# Patient Record
Sex: MALE | Race: WHITE | NOT HISPANIC OR LATINO | Employment: OTHER | URBAN - METROPOLITAN AREA
[De-identification: names, ages, dates, MRNs, and addresses within clinical notes are randomized per-mention and may not be internally consistent; named-entity substitution may affect disease eponyms.]

---

## 2018-01-23 ENCOUNTER — HOSPITAL ENCOUNTER (EMERGENCY)
Facility: MEDICAL CENTER | Age: 53
End: 2018-01-23

## 2018-01-23 VITALS
WEIGHT: 121.47 LBS | BODY MASS INDEX: 17.99 KG/M2 | OXYGEN SATURATION: 91 % | HEIGHT: 69 IN | RESPIRATION RATE: 17 BRPM | SYSTOLIC BLOOD PRESSURE: 112 MMHG | TEMPERATURE: 98.8 F | HEART RATE: 99 BPM | DIASTOLIC BLOOD PRESSURE: 68 MMHG

## 2018-01-23 LAB — GLUCOSE BLD-MCNC: 405 MG/DL (ref 65–99)

## 2018-01-23 PROCEDURE — 302449 STATCHG TRIAGE ONLY (STATISTIC)

## 2018-01-23 PROCEDURE — 82962 GLUCOSE BLOOD TEST: CPT

## 2018-01-23 ASSESSMENT — PAIN SCALES - GENERAL: PAINLEVEL_OUTOF10: 0

## 2018-01-23 NOTE — ED NOTES
Pt ambulates to triage  Chief Complaint   Patient presents with   • High Blood Sugar   reports taking his metformin, off insulin 3 months  405 in triage  Pt asked to wait in lobby, pt updated on triage process and pt asked to inform RN of any changes.

## 2018-01-24 ENCOUNTER — OFFICE VISIT (OUTPATIENT)
Dept: MEDICAL GROUP | Facility: MEDICAL CENTER | Age: 53
End: 2018-01-24
Payer: MEDICARE

## 2018-01-24 VITALS
DIASTOLIC BLOOD PRESSURE: 70 MMHG | OXYGEN SATURATION: 99 % | WEIGHT: 117.6 LBS | SYSTOLIC BLOOD PRESSURE: 118 MMHG | RESPIRATION RATE: 16 BRPM | HEART RATE: 85 BPM | TEMPERATURE: 97.2 F | BODY MASS INDEX: 17.42 KG/M2 | HEIGHT: 69 IN

## 2018-01-24 DIAGNOSIS — R73.9 HYPERGLYCEMIA: ICD-10-CM

## 2018-01-24 DIAGNOSIS — F31.62 BIPOLAR DISORDER, CURRENT EPISODE MIXED, MODERATE (HCC): ICD-10-CM

## 2018-01-24 DIAGNOSIS — F12.11 MARIJUANA ABUSE IN REMISSION: ICD-10-CM

## 2018-01-24 LAB
APPEARANCE UR: CLEAR
BILIRUB UR STRIP-MCNC: NORMAL MG/DL
COLOR UR AUTO: YELLOW
GLUCOSE UR STRIP.AUTO-MCNC: NORMAL MG/DL
HBA1C MFR BLD: 14.3 % (ref ?–5.8)
INT CON NEG: NEGATIVE
INT CON POS: POSITIVE
KETONES UR STRIP.AUTO-MCNC: NORMAL MG/DL
LEUKOCYTE ESTERASE UR QL STRIP.AUTO: NORMAL
NITRITE UR QL STRIP.AUTO: NORMAL
PH UR STRIP.AUTO: NORMAL [PH] (ref 5–8)
PROT UR QL STRIP: NORMAL MG/DL
RBC UR QL AUTO: NORMAL
SP GR UR STRIP.AUTO: NORMAL
UROBILINOGEN UR STRIP-MCNC: NORMAL MG/DL

## 2018-01-24 PROCEDURE — 83036 HEMOGLOBIN GLYCOSYLATED A1C: CPT | Performed by: NURSE PRACTITIONER

## 2018-01-24 PROCEDURE — 99204 OFFICE O/P NEW MOD 45 MIN: CPT | Mod: 25 | Performed by: NURSE PRACTITIONER

## 2018-01-24 PROCEDURE — 81002 URINALYSIS NONAUTO W/O SCOPE: CPT | Performed by: NURSE PRACTITIONER

## 2018-01-24 ASSESSMENT — ENCOUNTER SYMPTOMS: DEPRESSION: 1

## 2018-01-24 ASSESSMENT — PATIENT HEALTH QUESTIONNAIRE - PHQ9: CLINICAL INTERPRETATION OF PHQ2 SCORE: 0

## 2018-01-24 NOTE — PROGRESS NOTES
Subjective:      Celso Rose is a 53 y.o. male who presents with Establish Care        CC: Patient is here today to establish care as well as for need of medication for diabetes. He states he was a previous patient of the [facility] in [place] where he was being treated for his bipolar disorder and diabetes.    HPI Celso Rose      1. Hyperglycemia  Patient states he has been using metformin 1000 mg twice a day as his only medication for diabetes. He reports he also was on various other medicines at other times and went to the ER yesterday but left before he was seen. They did do a nonfasting blood sugar was high at 405. He states his morning blood sugar today was 125.    2. Uncontrolled type 2 diabetes mellitus without complication, with long-term current use of insulin (CMS-HCC)  Patient is not a good historian and some of the information he gives me is conflicting with information he gave 5 minutes earlier. Apparently she was on short acting insulin and then switched to Lantus insulin which he felt worked best. Most recently he states all he has been on has been metformin but wants to come off this because it causes erectile dysfunction? He also stated he had only one good working kidney but then stated his GFR was around 60 when it was last checked. He states his blood sugar was good at 125 this morning because he took 20 units of his father's Lantus insulin. His hemoglobin A1c in the office is 14.3.    3. Bipolar disorder, current episode mixed, moderate (CMS-HCC)  Patient reports he has history of bipolar disorder for which at one time he was on treatment but not for quite a while. He states he would be willing to establish with local psychiatry and start back on medication.    4. Marijuana abuse in remission  Patient feels that much of his mental health issue was related to daily use of marijuana. He states he has been off marijuana for at least a week. He does continue to smoke cigarettes  "but denies alcohol or other drug use.  Social History   Substance Use Topics   • Smoking status: Current Every Day Smoker     Packs/day: 1.00     Types: Cigarettes   • Smokeless tobacco: Never Used   • Alcohol use No     Current Outpatient Prescriptions   Medication Sig Dispense Refill   • metformin (GLUCOPHAGE) 1000 MG tablet Take 1,000 mg by mouth 2 times a day, with meals.     • insulin glargine (LANTUS SOLOSTAR) 100 UNIT/ML Solution Pen-injector injection Inject 20 Units as instructed every evening. 1 PEN 11     No current facility-administered medications for this visit.      Family History   Problem Relation Age of Onset   • Heart Disease Mother    • Diabetes Father    • Hypertension Father    History reviewed. No pertinent past medical history.    Review of Systems   Psychiatric/Behavioral: Positive for depression.   All other systems reviewed and are negative.         Objective:     /70   Pulse 85   Temp 36.2 °C (97.2 °F)   Resp 16   Ht 1.753 m (5' 9\")   Wt 53.3 kg (117 lb 9.6 oz)   SpO2 99%   BMI 17.37 kg/m²      Physical Exam   Constitutional: He is oriented to person, place, and time. He appears well-developed and well-nourished. No distress.   HENT:   Head: Normocephalic and atraumatic.   Right Ear: External ear normal.   Left Ear: External ear normal.   Nose: Nose normal.   Mouth/Throat: Oropharynx is clear and moist. Abnormal dentition.   Eyes: Conjunctivae are normal. Right eye exhibits no discharge. Left eye exhibits no discharge.   Neck: Normal range of motion. Neck supple. No tracheal deviation present. No thyromegaly present.   Cardiovascular: Normal rate, regular rhythm and normal heart sounds.    No murmur heard.  Pulmonary/Chest: Effort normal and breath sounds normal. No respiratory distress. He has no wheezes. He has no rales.   Lymphadenopathy:     He has no cervical adenopathy.   Neurological: He is alert and oriented to person, place, and time. Coordination normal.   Skin: " Skin is warm and dry. No rash noted. He is not diaphoretic. No erythema.   Psychiatric: He has a normal mood and affect. His behavior is normal. Judgment and thought content normal.   Nursing note and vitals reviewed.              Assessment/Plan:     1. Hyperglycemia  Patient's hemoglobin A1c is high at 14.3 and his blood sugar at the emergency room yesterday was in the 400 range. He states his fasting blood sugar this morning was 125 after he took Lantus insulin given to him by his father as well as his metformin. Unfortunately we are unable to do a fasting glucose test in the office today. His urine comes back showing only trace ketones. I am concerned about patient's diabetic status but it is hard to get an accurate history as to what has worked well for him. At least it appears that his blood sugars have improved with the Lantus according to his home readings.  - POCT  A1C  - POCT glucose  - POCT Urinalysis    2. Uncontrolled type 2 diabetes mellitus without complication, with long-term current use of insulin (CMS-HCC)  Patient feels that Lantus insulin worked well for him so I will have him start at 20 units daily with his metformin. I spoke with him about other medications which we can add including Amaryl or Jardiance but he states he does not want to go on any pills. He also states he wants to come off metformin. I advised him that although there are risks with many of these medicines, his hyperglycemia is currently more of a risk. He did not want to go on 3 time a day short acting insulin. I will have him do lab work and follow back within the next week or so. I will only stop his metformin if his kidney functions are low. He probably would not go to a higher dosage. I will continue to talk to him about oral medications as well.  - insulin glargine (LANTUS SOLOSTAR) 100 UNIT/ML Solution Pen-injector injection; Inject 20 Units as instructed every evening.  Dispense: 1 PEN; Refill: 11  - COMP METABOLIC  PANEL; Future  - MICROALBUMIN CREAT RATIO URINE; Future  - LIPID PROFILE; Future    3. Bipolar disorder, current episode mixed, moderate (CMS-[facility])  Patient needs to get into psychiatry and start back on medication and a referral was placed today.  - REFERRAL TO PSYCHIATRY    4. Marijuana abuse in remission  Patient states he has been off marijuana for one week and plans on staying off this. I praised him for quitting marijuana which was obviously worsening his bipolar symptoms.

## 2018-01-25 ENCOUNTER — HOSPITAL ENCOUNTER (OUTPATIENT)
Dept: LAB | Facility: MEDICAL CENTER | Age: 53
End: 2018-01-25
Attending: NURSE PRACTITIONER
Payer: MEDICARE

## 2018-01-25 LAB
ALBUMIN SERPL BCP-MCNC: 4.5 G/DL (ref 3.2–4.9)
ALBUMIN/GLOB SERPL: 1.8 G/DL
ALP SERPL-CCNC: 86 U/L (ref 30–99)
ALT SERPL-CCNC: 12 U/L (ref 2–50)
ANION GAP SERPL CALC-SCNC: 8 MMOL/L (ref 0–11.9)
AST SERPL-CCNC: 16 U/L (ref 12–45)
BILIRUB SERPL-MCNC: 0.5 MG/DL (ref 0.1–1.5)
BUN SERPL-MCNC: 17 MG/DL (ref 8–22)
CALCIUM SERPL-MCNC: 9.7 MG/DL (ref 8.5–10.5)
CHLORIDE SERPL-SCNC: 106 MMOL/L (ref 96–112)
CHOLEST SERPL-MCNC: 169 MG/DL (ref 100–199)
CO2 SERPL-SCNC: 25 MMOL/L (ref 20–33)
CREAT SERPL-MCNC: 0.82 MG/DL (ref 0.5–1.4)
CREAT UR-MCNC: 150.7 MG/DL
GLOBULIN SER CALC-MCNC: 2.5 G/DL (ref 1.9–3.5)
GLUCOSE SERPL-MCNC: 101 MG/DL (ref 65–99)
HDLC SERPL-MCNC: 52 MG/DL
LDLC SERPL CALC-MCNC: 109 MG/DL
MICROALBUMIN UR-MCNC: 1.8 MG/DL
MICROALBUMIN/CREAT UR: 12 MG/G (ref 0–30)
POTASSIUM SERPL-SCNC: 4.2 MMOL/L (ref 3.6–5.5)
PROT SERPL-MCNC: 7 G/DL (ref 6–8.2)
SODIUM SERPL-SCNC: 139 MMOL/L (ref 135–145)
TRIGL SERPL-MCNC: 40 MG/DL (ref 0–149)

## 2018-01-25 PROCEDURE — 36415 COLL VENOUS BLD VENIPUNCTURE: CPT

## 2018-01-25 PROCEDURE — 82043 UR ALBUMIN QUANTITATIVE: CPT

## 2018-01-25 PROCEDURE — 80053 COMPREHEN METABOLIC PANEL: CPT

## 2018-01-25 PROCEDURE — 82570 ASSAY OF URINE CREATININE: CPT

## 2018-01-25 PROCEDURE — 80061 LIPID PANEL: CPT

## 2018-02-07 ENCOUNTER — OFFICE VISIT (OUTPATIENT)
Dept: MEDICAL GROUP | Facility: MEDICAL CENTER | Age: 53
End: 2018-02-07
Payer: MEDICARE

## 2018-02-07 VITALS
WEIGHT: 129 LBS | HEIGHT: 69 IN | DIASTOLIC BLOOD PRESSURE: 68 MMHG | RESPIRATION RATE: 16 BRPM | SYSTOLIC BLOOD PRESSURE: 122 MMHG | BODY MASS INDEX: 19.11 KG/M2 | OXYGEN SATURATION: 96 % | HEART RATE: 78 BPM | TEMPERATURE: 97.2 F

## 2018-02-07 DIAGNOSIS — F12.11 MARIJUANA ABUSE IN REMISSION: ICD-10-CM

## 2018-02-07 LAB — RETINAL SCREEN: NEGATIVE

## 2018-02-07 PROCEDURE — 99214 OFFICE O/P EST MOD 30 MIN: CPT | Performed by: NURSE PRACTITIONER

## 2018-02-07 PROCEDURE — 92250 FUNDUS PHOTOGRAPHY W/I&R: CPT | Mod: TC | Performed by: NURSE PRACTITIONER

## 2018-02-07 RX ORDER — METFORMIN HYDROCHLORIDE 1000 MG/1
1 TABLET, FILM COATED, EXTENDED RELEASE ORAL DAILY
Qty: 30 TAB | Refills: 11 | Status: SHIPPED | OUTPATIENT
Start: 2018-02-07 | End: 2018-02-12

## 2018-02-07 ASSESSMENT — ENCOUNTER SYMPTOMS
GASTROINTESTINAL NEGATIVE: 1
MUSCULOSKELETAL NEGATIVE: 1
CARDIOVASCULAR NEGATIVE: 1
RESPIRATORY NEGATIVE: 1
CONSTITUTIONAL NEGATIVE: 1
PSYCHIATRIC NEGATIVE: 1
EYES NEGATIVE: 1
NEUROLOGICAL NEGATIVE: 1

## 2018-02-07 NOTE — PROGRESS NOTES
Subjective:      Celso Rose is a 53 y.o. male who presents with Follow-Up (lab results)        CC: Patient is here today for follow-up on diabetes and after recent emergency room visit for hyperglycemia.    HPI Celso Rose was seen initially at the office on January 24 after an emergency room visit for hyperglycemia. He had history of diabetes but his history was conflicting when he told me what medication he was taking. He does have a history of bipolar disorder. It appeared that he was taking metformin thousand milligrams twice a day but today he states he is on long-acting metformin at the milligrams once a day. He was put on 20 units of Lantus insulin and he states that his blood sugar is now have been running below 120. He states sometimes he does not feel well when they go in the 80s range. He is trying to watch his diet. Does not appear he has had an appointment with psychiatry as of yet. He reports he has been avoiding marijuana which was a problem for him in the past.        Social History   Substance Use Topics   • Smoking status: Current Every Day Smoker     Packs/day: 1.00     Types: Cigarettes   • Smokeless tobacco: Never Used   • Alcohol use No     Current Outpatient Prescriptions   Medication Sig Dispense Refill   • metformin ER modified (GLUMETZA) 1000 MG TABLET SR 24 HR Take 1 Each by mouth every day. 30 Tab 11   • insulin glargine (LANTUS SOLOSTAR) 100 UNIT/ML Solution Pen-injector injection Inject 17 Units as instructed every evening. 1 PEN 11     No current facility-administered medications for this visit.    History reviewed. No pertinent past medical history.   Family History   Problem Relation Age of Onset   • Heart Disease Mother    • Diabetes Father    • Hypertension Father        Review of Systems   Constitutional: Negative.    HENT: Negative.    Eyes: Negative.    Respiratory: Negative.    Cardiovascular: Negative.    Gastrointestinal: Negative.    Genitourinary: Negative.   "  Musculoskeletal: Negative.    Skin: Negative.    Neurological: Negative.    Endo/Heme/Allergies: Negative.    Psychiatric/Behavioral: Negative.    All other systems reviewed and are negative.         Objective:     /68   Pulse 78   Temp 36.2 °C (97.2 °F)   Resp 16   Ht 1.753 m (5' 9\")   Wt 58.5 kg (129 lb)   SpO2 96%   BMI 19.05 kg/m²      Physical Exam   Constitutional: He is oriented to person, place, and time. He appears well-developed and well-nourished. No distress.   HENT:   Head: Normocephalic and atraumatic.   Right Ear: External ear normal.   Left Ear: External ear normal.   Nose: Nose normal.   Mouth/Throat: Oropharynx is clear and moist.   Eyes: Conjunctivae are normal. Right eye exhibits no discharge. Left eye exhibits no discharge.   Neck: Normal range of motion. Neck supple. No tracheal deviation present. No thyromegaly present.   Cardiovascular: Normal rate, regular rhythm and normal heart sounds.    No murmur heard.  Pulmonary/Chest: Effort normal and breath sounds normal. No respiratory distress. He has no wheezes. He has no rales.   Feet:   Right Foot:   Protective Sensation: 7 sites tested. 7 sites sensed.   Skin Integrity: Negative for ulcer.   Left Foot:   Protective Sensation: 7 sites tested. 7 sites sensed.   Skin Integrity: Negative for ulcer.   Lymphadenopathy:     He has no cervical adenopathy.   Neurological: He is alert and oriented to person, place, and time. Coordination normal.   Skin: Skin is warm and dry. No rash noted. He is not diaphoretic. No erythema.   Psychiatric: He has a normal mood and affect. His behavior is normal. Judgment and thought content normal.   Nursing note and vitals reviewed.              Assessment/Plan:     1. Uncontrolled type 2 diabetes mellitus without complication, with long-term current use of insulin (CMS-[facility])  Patient does appear less manic in the office today than on his previous visit. I have refilled his metformin at the 1000 mg " long-acting daily as he tells me he is currently using. I will have him decrease his Lantus down to 17 units since he states his morning blood sugar sometimes are too low. He does have a follow-up appointment with myself and the diabetic nurse in 2 months. He will do lab work before that visit.  - metformin ER modified (GLUMETZA) 1000 MG TABLET SR 24 HR; Take 1 Each by mouth every day.  Dispense: 30 Tab; Refill: 11  - insulin glargine (LANTUS SOLOSTAR) 100 UNIT/ML Solution Pen-injector injection; Inject 17 Units as instructed every evening.  Dispense: 1 PEN; Refill: 11  - Diabetic Monofilament Lower Extremity Exam  - POCT Retinal Eye Exam  - COMP METABOLIC PANEL; Future  - HEMOGLOBIN A1C; Future    2. Marijuana abuse in remission  Patient reports he is avoiding marijuana usage. I reminded him about his psychiatric referral.

## 2018-02-12 RX ORDER — METFORMIN HYDROCHLORIDE 500 MG/1
1000 TABLET, EXTENDED RELEASE ORAL DAILY
Qty: 60 TAB | Refills: 11 | Status: SHIPPED | OUTPATIENT
Start: 2018-02-12 | End: 2018-10-22

## 2018-04-04 ENCOUNTER — PATIENT OUTREACH (OUTPATIENT)
Dept: HEALTH INFORMATION MANAGEMENT | Facility: OTHER | Age: 53
End: 2018-04-04

## 2018-04-04 ENCOUNTER — TELEPHONE (OUTPATIENT)
Dept: HEALTH INFORMATION MANAGEMENT | Facility: OTHER | Age: 53
End: 2018-04-04

## 2018-04-04 DIAGNOSIS — Z12.11 SCREENING FOR COLON CANCER: Primary | ICD-10-CM

## 2018-04-04 NOTE — PROGRESS NOTES
1. Attempt #: final     2. [facility] Verified: no    3. Verify PCP: yes    4. Care Team Updated:       •   DME Company (gait device, O2, CPAP, etc.): NO       •   Other Specialists (eye doctor, derm, GYN, cardiology, endo, etc): NO    5.  Reviewed/Updated the following with patient:       •   Communication Preference Obtained? YES       •   Preferred Pharmacy? YES       •   Preferred Lab? YES       •   Family History (document living status of immediate family members and if + hx of cancer, diabetes, hypertension, hyperlipidemia, heart attack, stroke) YES. Was Abstract Encounter opened and chart updated? YES    6. [employer] Activation: declined    7. [employer] Dana: no    8. Annual Wellness Visit Scheduling  Scheduling Status:Scheduled      9. Care Gap Scheduling (Attempt to Schedule EACH Overdue Care Gap!)     Health Maintenance Due   Topic Date Due   • Annual Wellness Visit  1965   • IMM HEP B VACCINE (1 of 3 - Primary Series) 1965   • IMM DTaP/Tdap/Td Vaccine (1 - Tdap) 01/21/1983   • IMM PNEUMOCOCCAL 19-64 (ADULT) MEDIUM RISK SERIES (1 of 1 - PPSV23) 01/21/1983   • COLONOSCOPY  01/21/2014 MAILED FIT TEST         Scheduled patient for Annual Wellness Visit    10. Patient was advised: “This is a free wellness visit. The provider will screen for medical conditions to help you stay healthy. If you have other concerns to address you may be asked to discuss these at a separate visit or there may be an additional fee.”     11. Patient was informed to arrive 15 min prior to their scheduled appointment and bring in their medication bottles.

## 2018-04-09 ENCOUNTER — HOSPITAL ENCOUNTER (OUTPATIENT)
Dept: LAB | Facility: MEDICAL CENTER | Age: 53
End: 2018-04-09
Attending: NURSE PRACTITIONER
Payer: MEDICARE

## 2018-04-09 ENCOUNTER — APPOINTMENT (OUTPATIENT)
Dept: MEDICAL GROUP | Facility: MEDICAL CENTER | Age: 53
End: 2018-04-09
Payer: MEDICARE

## 2018-04-09 LAB
ALBUMIN SERPL BCP-MCNC: 4 G/DL (ref 3.2–4.9)
ALBUMIN/GLOB SERPL: 1.5 G/DL
ALP SERPL-CCNC: 76 U/L (ref 30–99)
ALT SERPL-CCNC: 9 U/L (ref 2–50)
ANION GAP SERPL CALC-SCNC: 4 MMOL/L (ref 0–11.9)
AST SERPL-CCNC: 13 U/L (ref 12–45)
BILIRUB SERPL-MCNC: 0.4 MG/DL (ref 0.1–1.5)
BUN SERPL-MCNC: 10 MG/DL (ref 8–22)
CALCIUM SERPL-MCNC: 9.5 MG/DL (ref 8.5–10.5)
CHLORIDE SERPL-SCNC: 108 MMOL/L (ref 96–112)
CO2 SERPL-SCNC: 27 MMOL/L (ref 20–33)
CREAT SERPL-MCNC: 0.82 MG/DL (ref 0.5–1.4)
EST. AVERAGE GLUCOSE BLD GHB EST-MCNC: 246 MG/DL
GLOBULIN SER CALC-MCNC: 2.6 G/DL (ref 1.9–3.5)
GLUCOSE SERPL-MCNC: 95 MG/DL (ref 65–99)
HBA1C MFR BLD: 10.2 % (ref 0–5.6)
POTASSIUM SERPL-SCNC: 4.1 MMOL/L (ref 3.6–5.5)
PROT SERPL-MCNC: 6.6 G/DL (ref 6–8.2)
SODIUM SERPL-SCNC: 139 MMOL/L (ref 135–145)

## 2018-04-09 PROCEDURE — 83036 HEMOGLOBIN GLYCOSYLATED A1C: CPT

## 2018-04-09 PROCEDURE — 36415 COLL VENOUS BLD VENIPUNCTURE: CPT

## 2018-04-09 PROCEDURE — 80053 COMPREHEN METABOLIC PANEL: CPT

## 2018-04-11 ENCOUNTER — HOSPITAL ENCOUNTER (OUTPATIENT)
Facility: MEDICAL CENTER | Age: 53
End: 2018-04-11
Attending: NURSE PRACTITIONER
Payer: MEDICARE

## 2018-04-11 PROCEDURE — 82274 ASSAY TEST FOR BLOOD FECAL: CPT

## 2018-04-12 ENCOUNTER — OFFICE VISIT (OUTPATIENT)
Dept: MEDICAL GROUP | Facility: MEDICAL CENTER | Age: 53
End: 2018-04-12
Payer: MEDICARE

## 2018-04-12 VITALS
WEIGHT: 133 LBS | HEIGHT: 68 IN | OXYGEN SATURATION: 96 % | SYSTOLIC BLOOD PRESSURE: 122 MMHG | RESPIRATION RATE: 16 BRPM | BODY MASS INDEX: 20.16 KG/M2 | DIASTOLIC BLOOD PRESSURE: 80 MMHG | HEART RATE: 86 BPM | TEMPERATURE: 98.4 F

## 2018-04-12 DIAGNOSIS — F12.90 MARIJUANA USE: ICD-10-CM

## 2018-04-12 DIAGNOSIS — F31.62 BIPOLAR DISORDER, CURRENT EPISODE MIXED, MODERATE (HCC): ICD-10-CM

## 2018-04-12 PROBLEM — F12.11 MARIJUANA ABUSE IN REMISSION: Status: RESOLVED | Noted: 2018-01-24 | Resolved: 2018-04-12

## 2018-04-12 PROCEDURE — G0439 PPPS, SUBSEQ VISIT: HCPCS | Performed by: NURSE PRACTITIONER

## 2018-04-12 PROCEDURE — 99213 OFFICE O/P EST LOW 20 MIN: CPT | Mod: 25 | Performed by: NURSE PRACTITIONER

## 2018-04-12 ASSESSMENT — PATIENT HEALTH QUESTIONNAIRE - PHQ9
5. POOR APPETITE OR OVEREATING: 0 - NOT AT ALL
SUM OF ALL RESPONSES TO PHQ QUESTIONS 1-9: 8
CLINICAL INTERPRETATION OF PHQ2 SCORE: 3

## 2018-04-12 ASSESSMENT — ACTIVITIES OF DAILY LIVING (ADL): BATHING_REQUIRES_ASSISTANCE: 0

## 2018-04-12 NOTE — PROGRESS NOTES
RN-CDE Note    Subjective:     Health changes since last visit/interval Hx: General health he states is fair but does not feel like he will be alive in 10 years.    Medications (including changes made today)  Current Outpatient Prescriptions   Medication Sig Dispense Refill   • metFORMIN ER (GLUCOPHAGE XR) 500 MG TABLET SR 24 HR Take 2 Tabs by mouth every day. 60 Tab 11   • insulin glargine (LANTUS SOLOSTAR) 100 UNIT/ML Solution Pen-injector injection Inject 17 Units as instructed every evening. 1 PEN 11     No current facility-administered medications for this visit.          Taking daily ASA: No  Taking above medications as prescribed: Yes   Patient Denies side effects of medication.    Exercise: Works in the yard  Diet: Breakfast he doesn't usually eat.  Lunch is sandwich and drinks diet soda or water.  Dinner varies.    Health Maintenance:   Health Maintenance Topics with due status: Overdue       Topic Date Due    Annual Wellness Visit 1965    IMM HEP B VACCINE 1965    IMM DTaP/Tdap/Td Vaccine 02/02/2001    COLONOSCOPY 01/21/2014         DM:   Last A1c:   Lab Results   Component Value Date/Time    HBA1C 10.2 (H) 04/09/2018 08:22 AM      A1c goal: < 7    Glucose monitoring frequency: Not testing blood sugars because he doesn't have strips    Hypoglycemic episodes: yes - states feels low if he doesn't eat     Last Retinal Exam: 2/7/18 Provider: office  Daily Foot Exam: yes  Routine Dental Exams: no    Lab Results   Component Value Date/Time    MALBCRT 12 01/25/2018 07:45 AM    MICROALBUR 1.8 01/25/2018 07:45 AM        ACR Albumin/Creatinine Ratio goal <30     Diabetic complications: none    HTN:   Blood pressure goal <140/<80 .   Currently Rx ACE/ARB: No    Dyslipidemia:    Lab Results   Component Value Date/Time    CHOLSTRLTOT 169 01/25/2018 07:45 AM     (H) 01/25/2018 07:45 AM    HDL 52 01/25/2018 07:45 AM    TRIGLYCERIDE 40 01/25/2018 07:45 AM       Lab Results   Component Value Date/Time     SODIUM 139 04/09/2018 08:22 AM    POTASSIUM 4.1 04/09/2018 08:22 AM    CHLORIDE 108 04/09/2018 08:22 AM    CO2 27 04/09/2018 08:22 AM    GLUCOSE 95 04/09/2018 08:22 AM    BUN 10 04/09/2018 08:22 AM    CREATININE 0.82 04/09/2018 08:22 AM     Lab Results   Component Value Date/Time    ALKPHOSPHAT 76 04/09/2018 08:22 AM    ASTSGOT 13 04/09/2018 08:22 AM    ALTSGPT 9 04/09/2018 08:22 AM    TBILIRUBIN 0.4 04/09/2018 08:22 AM        Currently Rx Statin: Yes      He  reports that he has been smoking Cigarettes.  He has a 1.00 pack-year smoking history. He has never used smokeless tobacco.    Objective:     Exam:  Monofilament: done  Monofilament testing with a 10 gram force: sensation intact: intact bilaterally  Visual Inspection: Feet without maceration, ulcers, fissures.  Pedal pulses: intact bilaterally      Plan:     - Diabetic diet discussed in detail-plate method.  - Home glucose monitoring.  - He will test and log.    - He will walk for 20-30 minutes daily.  - Reviewed medications and advised to take metformin after meals to decrease   G.I.upset.   - Discussed importance of immunizations and yearly eye exams.   - Encouraged patient to attend diabetes education program.   -Educational material distributed.   - Advised daily foot exams. Educated on signs of infection.       Recommended medication changes: He may benefit having having a C-peptide and ALLI to see how much insulin he is making.  He may benefit adding 2 mg Glimepiride daily but he does not want to take more medications.  He was given a Contour Next meter to start testing his blood sugars.

## 2018-04-12 NOTE — PROGRESS NOTES
Chief Complaint   Patient presents with   • Annual Wellness Visit         HPI:  Celso is a 53 y.o. here for Medicare Annual Wellness Visit as well as to be with myself and the diabetic nurse regarding his poorly controlled diabetes.    1. Uncontrolled type 2 diabetes mellitus without complication, with long-term current use of insulin (CMS-HCC)  Patient established recently with this clinic and came to us with high hemoglobin A1c at 14.3. He was started back on metformin and Lantus which he had been using in the past. He is currently taking thousand milligrams of metformin ER daily as well as 20 units of Lantus insulin. With this his hemoglobin A1c has decreased down to 10 and he states his fasting blood sugars are running well over the past 2-3 weeks. He has been reluctant to try other medications. He is very opinionated on his choices for medicine and use of medicines. He states he is trying to follow a low carb diet.    2. Bipolar disorder, current episode mixed, moderate (CMS-HCC)  Patient was referred in January to mental health because of history of bipolar disorder which patient has been treating through marijuana by himself. He states he did not follow-up on the referral but would be willing to go to counseling now.    3. Marijuana use  Patient had stopped using marijuana for a while but states he is back to using it on a regular basis because he feels it helps him with his bipolar disorder. He states he is not using alcohol or opioids.    Patient Active Problem List    Diagnosis Date Noted   • Uncontrolled type 2 diabetes mellitus without complication, with long-term current use of insulin (CMS-HCC) 01/24/2018   • Marijuana abuse in remission 01/24/2018   • Bipolar disorder, current episode mixed, moderate (CMS-HCC) 01/24/2018       Current Outpatient Prescriptions   Medication Sig Dispense Refill   • metFORMIN ER (GLUCOPHAGE XR) 500 MG TABLET SR 24 HR Take 2 Tabs by mouth every day. 60 Tab 11   • insulin  glargine (LANTUS SOLOSTAR) 100 UNIT/ML Solution Pen-injector injection Inject 17 Units as instructed every evening. 1 PEN 11     No current facility-administered medications for this visit.         Patient is taking medications as noted in medication list.  Current supplements as per medication list.     Allergies: Patient has no known allergies.    Current social contact/activities: Visit with family and friends occasionally     Patient's perception of their health: fair    Is patient current with immunizations? Yes.    He  reports that he has been smoking Cigarettes.  He has been smoking about 1.00 pack per day. He has never used smokeless tobacco. He reports that he uses drugs. He reports that he does not drink alcohol.  Ready to quit: Not Answered  Counseling given: Not Answered      DPA/Advanced directive: Patient does not have an Advanced Directive.  A packet and workshop information was given on Advanced Directives.    ROS:    Gait: Uses a cane PRN   Ostomy: No   Other tubes: No   Amputations: No   Chronic oxygen use: No   Last eye exam: 02/0718   Wears hearing aids: No   : Denies any urinary leakage during the last 6 months     Screening:  DIABETES    Has patient ever had diabetes education? Yes, and patient is interested in more. Referral pended.    Depression Screening  Little interest or pleasure in doing things?  0 - not at all  Feeling down, depressed, or hopeless? 3 - nearly every day  Trouble falling or staying asleep, or sleeping too much?  2 - more than half the days  Feeling tired or having little energy?  0 - not at all  Poor appetite or overeating?  0 - not at all  Feeling bad about yourself - or that you are a failure or have let yourself or your family down? 3 - nearly every day  Trouble concentrating on things, such as reading the newspaper or watching television? 0 - not at all  Moving or speaking so slowly that other people could have noticed.  Or the opposite - being so fidgety or  restless that you have been moving around a lot more than usual?  0 - not at all  Thoughts that you would be better off dead, or of hurting yourself?  0 - not at all  Patient Health Questionnaire Score: 8    If depressive symptoms identified deferred to follow up visit unless specifically addressed in assessment and plan.    Interpretation of PHQ-9 Total Score   Score Severity   1-4 No Depression   5-9 Mild Depression   10-14 Moderate Depression   15-19 Moderately Severe Depression   20-27 Severe Depression      Screening for Cognitive Impairment  Three Minute Recall (Village, Kitchen, Baby)  2/3   Draw clock face with all 12 numbers set to the hand to show 10 minutes past 11 o'clock  1 4/5 Time 3:40  If cognitive concerns identified, deferred for follow up unless specifically addressed in assessment and plan.    Fall Risk Assessment  Has the patient had two or more falls in the last year or any fall with injury in the last year?  Yes  If fall risk identified, deferred for follow up unless specifically addressed in assessment and plan.      Safety Assessment  Throw rugs on floor.  No  Handrails on all stairs.  Yes  Good lighting in all hallways.  Yes  Difficulty hearing.  No  Patient counseled about all safety risks that were identified.    Functional Assessment ADLs  Are there any barriers preventing you from cooking for yourself or meeting nutritional needs?  No.    Are there any barriers preventing you from driving safely or obtaining transportation?  No.    Are there any barriers preventing you from using a telephone or calling for help?  No.    Are there any barriers preventing you from shopping?  No.    Are there any barriers preventing you from taking care of your own finances?  No.    Are there any barriers preventing you from managing your medications?    No.    Are there any barriers preventing you from showering, bathing or dressing yourself?  No.    Are you currently engaging any exercise or physical  "activity?  Yes.  PT reports yard work occasionally, work on cars occasionally    Health Maintenance Summary                Annual Wellness Visit Overdue 1965     IMM HEP B VACCINE Overdue 1965     IMM DTaP/Tdap/Td Vaccine Overdue 2/2/2001      Done 2/1/2001 Imm Admin: TD Vaccine    COLONOSCOPY Overdue 1/21/2014     A1C SCREENING Next Due 10/9/2018      Done 4/9/2018 HEMOGLOBIN A1C      Patient has more history with this topic...    FASTING LIPID PROFILE Next Due 1/25/2019      Done 1/25/2018 LIPID PROFILE     URINE ACR / MICROALBUMIN Next Due 1/25/2019      Done 1/25/2018 MICROALBUMIN CREAT RATIO URINE    RETINAL SCREENING Next Due 2/7/2019      Done 2/7/2018 POCT RETINAL EYE EXAM    DIABETES MONOFILAMENT / LE EXAM Next Due 2/7/2019      Done 2/7/2018 [employer]: FINDINGS - TESTS - NEUROLOGY - MONOFILAMENT TEST - RIGHT FOOT - NUMBER OF SITES TESTED     Patient has more history with this topic...    SERUM CREATININE Next Due 4/9/2019      Done 4/9/2018 COMP METABOLIC PANEL     Patient has more history with this topic...          Patient Care Team:  RAFA Koch as PCP - General (Family Medicine)      Social History   Substance Use Topics   • Smoking status: Current Every Day Smoker     Packs/day: 1.00     Types: Cigarettes   • Smokeless tobacco: Never Used   • Alcohol use No     Family History   Problem Relation Age of Onset   • Heart Disease Mother    • Diabetes Father    • Hypertension Sister    • Alcohol/Drug Sister      He  has no past medical history on file.   Past Surgical History:   Procedure Laterality Date   • KNEE ARTHROSCOPY     • SHOULDER ARTHROSCOPY         Exam:   Blood pressure 122/80, pulse 86, temperature 36.9 °C (98.4 °F), resp. rate 16, height 1.727 m (5' 8\"), weight 60.3 kg (133 lb), SpO2 96 %. Body mass index is 20.22 kg/m².    Hearing good.    Dentition fair  Alert, oriented in no acute distress.  Eye contact is good, speech goal directed, affect calm  Chest: Clear " bilaterally to A&P without wheeze or rhonchi.  Heart: Regular rate and rhythm without murmur.  Abdomen: Soft, nontender, bowel sounds present, negative for masses or organomegaly.  Extremities: Warm without cyanosis or edema.  Psychiatric: Patient is very talkative and opinionated on his care and not wanting to make changes. He is very talkative and slightly manic.    Assessment and Plan. The following treatment and monitoring plan is recommended:        1. Uncontrolled type 2 diabetes mellitus without complication, with long-term current use of insulin (CMS-HCC)  Patient met with myself and the diabetic nurse today and he will start checking his sugars regularly and I have shown the increase in his Lantus insulin since his last visit at 20 units. I again advised him to increase by 3 units every 3 days as necessary to get his fasting blood sugars below 130. He does not want to try any other oral medications. He will follow back in 3 months.  - Blood Glucose Monitoring Suppl Supplies Misc; Test strips order: Test strips for Bernard Contour Next meter. Sig: use 3 times daily and prn ssx high or low sugar  Dispense: 100 Strip; Refill: 6  - insulin glargine (LANTUS SOLOSTAR) 100 UNIT/ML Solution Pen-injector injection; Inject 20 Units as instructed every evening.  Dispense: 1 PEN; Refill: 11    2. Bipolar disorder, current episode mixed, moderate (CMS-HCC)  Patient advised to follow-up with mental health regarding his bipolar disorder which he currently is self treating with marijuana. He was given the phone number to recontact them.    3. Marijuana use  I advised patient that daily marijuana use is not recommended and questionable as far as help with bipolar disorder. He does not wish to change.  Services suggested: No services needed at this time  Health Care Screening: Age-appropriate preventive services recommended by USPTF and ACIP covered by Medicare were discussed today. Services ordered if indicated and agreed  upon by the patient.  Referrals offered: PT/OT/Nutrition counseling/Behavioral Health/Smoking cessation as per orders if indicated.    Discussion today about general wellness and lifestyle habits:    · Prevent falls and reduce trip hazards; Cautioned about securing or removing rugs.  · Have a working fire alarm and carbon monoxide detector;   · Engage in regular physical activity and social activities     Follow-up: No Follow-up on file.

## 2018-04-17 DIAGNOSIS — Z12.11 SCREENING FOR COLON CANCER: ICD-10-CM

## 2018-04-17 LAB
AMBIGUOUS DTTM AMBI4: NORMAL
HEMOCCULT STL QL IA: NEGATIVE

## 2018-07-12 ENCOUNTER — APPOINTMENT (OUTPATIENT)
Dept: MEDICAL GROUP | Facility: MEDICAL CENTER | Age: 53
End: 2018-07-12
Payer: MEDICARE

## 2018-07-18 ENCOUNTER — OFFICE VISIT (OUTPATIENT)
Dept: MEDICAL GROUP | Facility: MEDICAL CENTER | Age: 53
End: 2018-07-18
Payer: MEDICARE

## 2018-07-18 VITALS
DIASTOLIC BLOOD PRESSURE: 72 MMHG | HEIGHT: 68 IN | RESPIRATION RATE: 16 BRPM | SYSTOLIC BLOOD PRESSURE: 126 MMHG | OXYGEN SATURATION: 96 % | HEART RATE: 74 BPM | WEIGHT: 129 LBS | TEMPERATURE: 98.2 F | BODY MASS INDEX: 19.55 KG/M2

## 2018-07-18 DIAGNOSIS — Z00.00 MEDICARE ANNUAL WELLNESS VISIT, SUBSEQUENT: ICD-10-CM

## 2018-07-18 DIAGNOSIS — F12.90 MARIJUANA USE: ICD-10-CM

## 2018-07-18 DIAGNOSIS — F31.62 BIPOLAR DISORDER, CURRENT EPISODE MIXED, MODERATE (HCC): ICD-10-CM

## 2018-07-18 DIAGNOSIS — L98.9 SKIN LESIONS: ICD-10-CM

## 2018-07-18 DIAGNOSIS — B35.1 ONYCHOMYCOSIS: ICD-10-CM

## 2018-07-18 DIAGNOSIS — Z72.0 TOBACCO ABUSE: ICD-10-CM

## 2018-07-18 LAB
HBA1C MFR BLD: 9 % (ref ?–5.8)
INT CON NEG: NEGATIVE
INT CON POS: POSITIVE

## 2018-07-18 PROCEDURE — 99214 OFFICE O/P EST MOD 30 MIN: CPT | Mod: 25 | Performed by: NURSE PRACTITIONER

## 2018-07-18 PROCEDURE — G0439 PPPS, SUBSEQ VISIT: HCPCS | Performed by: NURSE PRACTITIONER

## 2018-07-18 PROCEDURE — 83036 HEMOGLOBIN GLYCOSYLATED A1C: CPT | Performed by: NURSE PRACTITIONER

## 2018-07-18 ASSESSMENT — PATIENT HEALTH QUESTIONNAIRE - PHQ9: CLINICAL INTERPRETATION OF PHQ2 SCORE: 0

## 2018-07-18 ASSESSMENT — ACTIVITIES OF DAILY LIVING (ADL): BATHING_REQUIRES_ASSISTANCE: 0

## 2018-07-18 ASSESSMENT — ENCOUNTER SYMPTOMS: GENERAL WELL-BEING: FAIR

## 2018-07-18 NOTE — PROGRESS NOTES
CC: Patient here today for follow-up on diabetes as well as for referrals for skin lesions and toenail fungus.    HPI:   Celso presents today with the following.    1. Medicare annual wellness visit, subsequent    Screening performed below.    2. Uncontrolled type 2 diabetes mellitus without complication, with long-term current use of insulin (HCC)  Patient's previous hemoglobin A1c had been in the 11 range. Patient has been reluctant to use medication but finally agreed to go on long-acting metformin 1000 mg daily and take Lantus insulin at 25 units daily. He admits he does not always remember to take his medication at least once a week. He also is not following a low carb diet. Patient is very opinionated and reluctant to go on medications or make changes in diet. He does not check blood sugars at home per his choice.    3. Skin lesions  Patient has what he describes as cysts that occur over various areas of his body and had previously been removed by dermatology years ago. He has one behind his right ear and one on his back he would like them to look at.    4. Onychomycosis  Patient has thickening and yellowing of the toenails and would like to see podiatry to talk about treatment.    5. Tobacco abuse  Patient continues to smoke tobacco and is not ready to quit.    6. Bipolar disorder, current episode mixed, moderate (HCC)  Patient admits to history of bipolar disorder but has declined treatment or counseling.    7. Marijuana use  Patient continues to smoke marijuana through the week which he feels is helpful for his bipolar.      Depression Screening    Little interest or pleasure in doing things?  0 - not at all  Feeling down, depressed , or hopeless? 0 - not at all  Patient Health Questionnaire Score: 0     If depressive symptoms identified deferred to follow up visit unless specifically addressed in assessment and plan.    Interpretation of PHQ-9 Total Score   Score Severity   1-4 No Depression   5-9 Mild  Depression   10-14 Moderate Depression   15-19 Moderately Severe Depression   20-27 Severe Depression    Screening for Cognitive Impairment    Three Minute Recall (leader, season, table) 3/3    [place] clock face with all 12 numbers and set the hands to show 10 past 11.  Yes    Cognitive concerns identified deferred for follow up unless specifically addressed in assessment and plan.    Fall Risk Assessment    Has the patient had two or more falls in the last year or any fall with injury in the last year?  Yes    Safety Assessment    Throw rugs on floor.  Yes  Handrails on all stairs.  Yes  Good lighting in all hallways.  Yes  Difficulty hearing.  Yes  Patient counseled about all safety risks that were identified.    Functional Assessment ADLs    Are there any barriers preventing you from cooking for yourself or meeting nutritional needs?  No.    Are there any barriers preventing you from driving safely or obtaining transportation?  No.    Are there any barriers preventing you from using a telephone or calling for help?  No.    Are there any barriers preventing you from shopping?  No.    Are there any barriers preventing you from taking care of your own finances?  No.    Are there any barriers preventing you from managing your medications?  No.    Are there any barriers preventing you from showering, bathing or dressing yourself?  No.    Are you currently engaging in any exercise or physical activity?  Yes.     What is your perception of your health?  Fair.      Health Maintenance Summary                Annual Wellness Visit Overdue 1965     IMM HEP B VACCINE Postponed 7/10/2020 Originally 1965. Patient Refused    IMM DTaP/Tdap/Td Vaccine Postponed 7/13/2022 Originally 2/2/2001. Patient Refused     Done 2/1/2001 Imm Admin: TD Vaccine    IMM INFLUENZA Next Due 9/1/2018      Done 10/17/2017 Imm Admin: Influenza Vaccine Pediatric Split     Patient has more history with this topic...    A1C SCREENING Next Due  "10/9/2018      Done 4/9/2018 HEMOGLOBIN A1C      Patient has more history with this topic...    FASTING LIPID PROFILE Next Due 1/25/2019      Done 1/25/2018 LIPID PROFILE     URINE ACR / MICROALBUMIN Next Due 1/25/2019      Done 1/25/2018 MICROALBUMIN CREAT RATIO URINE    RETINAL SCREENING Next Due 2/7/2019      Done 2/7/2018 POCT RETINAL EYE EXAM    DIABETES MONOFILAMENT / LE EXAM Next Due 2/7/2019      Done 2/7/2018 [employer]: FINDINGS - TESTS - NEUROLOGY - MONOFILAMENT TEST - RIGHT FOOT - NUMBER OF SITES TESTED     Patient has more history with this topic...    SERUM CREATININE Next Due 4/9/2019      Done 4/9/2018 COMP METABOLIC PANEL     Patient has more history with this topic...          Patient Care Team:  RAFA Koch as PCP - General (Family Medicine)          Patient Active Problem List    Diagnosis Date Noted   • Tobacco abuse 07/18/2018   • Marijuana use 04/12/2018   • Uncontrolled type 2 diabetes mellitus without complication, with long-term current use of insulin ([facility]) 01/24/2018   • Bipolar disorder, current episode mixed, moderate ([facility]) 01/24/2018       Current Outpatient Prescriptions   Medication Sig Dispense Refill   • insulin glargine (LANTUS SOLOSTAR) 100 UNIT/ML Solution Pen-injector injection Inject 30 Units as instructed every evening. 1 PEN 11   • Blood Glucose Monitoring Suppl Supplies Misc Test strips order: Test strips for Bernard Contour Next meter. Sig: use 3 times daily and prn ssx high or low sugar 100 Strip 6   • Misc. Devices Misc lantus nanno pen needles to use QD 30 Each 11   • metFORMIN ER (GLUCOPHAGE XR) 500 MG TABLET SR 24 HR Take 2 Tabs by mouth every day. 60 Tab 11     No current facility-administered medications for this visit.          Allergies as of 07/18/2018   • (No Known Allergies)        ROS: As per HPI.    /72   Pulse 74   Temp 36.8 °C (98.2 °F)   Resp 16   Ht 1.727 m (5' 8\")   Wt 58.5 kg (129 lb)   SpO2 96%   BMI 19.61 kg/m²     Physical " Exam:  Gen:         Alert and oriented, No apparent distress. Anxious and opinionated.  Neck:        No Lymphadenopathy or Bruits.  Lungs:     Clear to auscultation bilaterally  CV:          Regular rate and rhythm. No murmurs, rubs or gallops.  Abd:         Soft non tender, non distended. Normal active bowel sounds.  No  Hepatosplenomegaly, No pulsatile masses.                   Ext:          No clubbing, cyanosis, edema.  Skin:        A few small cystic areas noted on the body.    Assessment and Plan.   53 y.o. male with the following issues.    1. Medicare annual wellness visit, subsequent  Annual wellness topics discussed, review of chronic medical problems completed    - Annual Wellness Visit - Includes PPPS Subsequent ()    2. Uncontrolled type 2 diabetes mellitus without complication, with long-term current use of insulin (HCC)  Patient advised that his hemoglobin A1c is still too high at 9.0 and I would like to see him below 7.0. I would like to start him on Jardiance but patient declines any additional medicine. He did reluctantly agree to increase his Lantus to 30 units daily. He does not want to check his blood sugars daily or decrease his carbohydrate intake. I will have him follow back with myself in the diabetic nurse in 3 months and if he refuses any further medication we will need to increase his Lantus. I also advised him that it is important he remember to take his medicine every day as prescribed.  - POCT  A1C  - insulin glargine (LANTUS SOLOSTAR) 100 UNIT/ML Solution Pen-injector injection; Inject 30 Units as instructed every evening.  Dispense: 1 PEN; Refill: 11  - COMP METABOLIC PANEL; Future  - HEMOGLOBIN A1C; Future    3. Skin lesions  Patient referred to dermatology and advised not to use alcohol to the areas as he has been doing.  - REFERRAL TO DERMATOLOGY    4. Onychomycosis  Patient would like to talk to podiatry about treatment.  - REFERRAL TO PODIATRY    5. Tobacco abuse  Patient  states unwilling to quit smoking.    6. Bipolar disorder, current episode mixed, moderate (HCC)  Patient continues to not want to be referred to psychiatry.    7. Marijuana use  Patient again wishes to continue to use marijuana daily.

## 2018-07-19 ENCOUNTER — OFFICE VISIT (OUTPATIENT)
Dept: BEHAVIORAL HEALTH | Facility: PHYSICIAN GROUP | Age: 53
End: 2018-07-19
Payer: MEDICARE

## 2018-07-19 DIAGNOSIS — F31.81 BIPOLAR 2 DISORDER, MAJOR DEPRESSIVE EPISODE (HCC): ICD-10-CM

## 2018-07-19 PROCEDURE — 90791 PSYCH DIAGNOSTIC EVALUATION: CPT | Performed by: PSYCHOLOGIST
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
## 2018-08-03 ENCOUNTER — PATIENT OUTREACH (OUTPATIENT)
Dept: HEALTH INFORMATION MANAGEMENT | Facility: OTHER | Age: 53
End: 2018-08-03

## 2018-08-03 NOTE — PROGRESS NOTES
Outcome: Requested A Call Back     Please transfer to Patient Outreach Team at [phone] when patient returns call.    WebIZ Checked & Epic Updated:  yes    [facility] Verified: yes    Attempt # 1

## 2018-08-15 ENCOUNTER — OFFICE VISIT (OUTPATIENT)
Dept: BEHAVIORAL HEALTH | Facility: PHYSICIAN GROUP | Age: 53
End: 2018-08-15
Payer: MEDICARE

## 2018-08-15 DIAGNOSIS — F31.62 BIPOLAR DISORDER, CURRENT EPISODE MIXED, MODERATE (HCC): ICD-10-CM

## 2018-08-15 PROCEDURE — 90834 PSYTX W PT 45 MINUTES: CPT | Performed by: PSYCHOLOGIST
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
## 2018-08-29 ENCOUNTER — OFFICE VISIT (OUTPATIENT)
Dept: BEHAVIORAL HEALTH | Facility: PHYSICIAN GROUP | Age: 53
End: 2018-08-29
Payer: MEDICARE

## 2018-08-29 DIAGNOSIS — F41.1 GAD (GENERALIZED ANXIETY DISORDER): ICD-10-CM

## 2018-08-29 DIAGNOSIS — F60.6 AVOIDANT PERSONALITY DISORDER IN ADULT (HCC): ICD-10-CM

## 2018-08-29 DIAGNOSIS — F34.1 DYSTHYMIC DISORDER: ICD-10-CM

## 2018-08-29 DIAGNOSIS — F31.62 BIPOLAR DISORDER, CURRENT EPISODE MIXED, MODERATE (HCC): ICD-10-CM

## 2018-08-29 PROCEDURE — 96101 PB PSYCHOLOGIC TESTING BY PSYCH/PHYS: CPT | Performed by: PSYCHOLOGIST

## 2018-08-29 PROCEDURE — 90834 PSYTX W PT 45 MINUTES: CPT | Performed by: PSYCHOLOGIST

## 2018-08-29 NOTE — BH THERAPY
[facility] Therapy Progress Note    Patient Name: Celso Rose  Patient MRN: [id number]  Today's Date: 8/15/2018     Type of session:Individual psychotherapy  Length of session: 45 minutes  Persons in attendance:Patient    Subjective/New Info: The patient ID/Chief Complaint:  The patient is a 53 year old male, , .  The patient referred by Louis Brennan A.P.N. and voluntarily presented for an individual intake to address chronic depressive and some hypomania events denies history of anabell.  Reviewed limits of confidentiality and [facility] policies; patient expressed understanding and agreed to voluntarily proceed with evaluation and treatment.    Interval History:   Session Focus: The patient's estimated global assessment of functioning suggests that the patient's behavior may be influenced by severe psychological difficulties and significant problems functioning in most areas of life (e.g., relationships, and work life). The patient's affective and emotional state appeared quite depressed.     The patient was introduced to cognitive behavioral therapy and the importance of self-monitoring related to mood. He was provided information about bipolar disorder. At the present time the patient is experiencing moderate to severe depressive symptoms so the early focus of treatment will be on addressing automatic thoughts related to depression.     Therapeutic Intervention: Cognitive Behavioral Therapy      Planned Intervention: Review the result of psychological testing and adjust the treatment plan/ patient will complete daily mood charting    Objective/Observations:  Mental Status    Patient did not present in acute distress. Patient was appropriately groomed. Patient was alert and oriented x4. Eye contact was appropriate. No abnormalities in attention or concentration were noted. No abnormalities of movement present; psychomotor activity was normal. Speech was  "fluent and regular in rhythm, rate, volume, and tone. Thought processes Linear, Logical and Goal Directed. There was no evidence of thought disorder. No auditory or visual hallucinations. Long and short term memory appeared to be intact. Insight, judgment, and impulse control were deemed to be fair.  Reported mood was “depressed.” Affect was full-ranging and appropriate to thought content and conversation.  Patient denied current suicidal and homicidal ideation in plan, intent, and preparatory behavior.    Psychometric Test Results:       BHM-20  Global Mental Health  Severe Distress  Well Being Scale  Severe Distress  Symptoms Scale  Mild Distress  Anxiety Subscale  Mild Distress  Depression Subscale  Severe Distress  Alcohol/Drug Subscale Within Normal Limits  Bipolar Subscale  Within Normal Limits  Eating Disorder Subscale Within Normal Limits  Harm to other Subscale Within Normal Limits  Suicide Monitoring Scale Low Risk  Life Functioning Scale   Severe Distress    Psychological testing results\"    The ROSITA was in validated as a result of the patience and consistency in responses. The MCMI IV suggest the presence of avoidant personality, persistent depressive symptoms major depression and generalized anxiety disorder the results of the psychological tests appear to be a good fit for the patient based on clinical interview and passed  Records. Future therapy should I dress personality considerations as part of the management of this patient’s mood disorder there are some thoughts of the patient does not mean diagnostic criteria for bipolar 2 this will need to be  examine detail using mood charting.      Diagnoses:   1. Bipolar disorder, current episode mixed, moderate (HCC)    2. 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 ALLI (generalized anxiety disorder)    4. Dysthymic disorder     Risk Assessment:      The patient denied any suicide-related ideation and/or behaviors and intent/plan, denied thoughts about " death and dying both in session and during the period since last appointment, or past 2 weeks.  Current Risk and Protective Factors:  Psychiatric History and Current Status:    ?history of suicide attempt < 3 months;   ?history of suicide attempt   ?history of psychiatric inpatient care;   ?history of non-suicidal self-injurious behaviors;   ?depression or other mood disorders;   ?personality disorders or traits;   ?PTSD or anxiety disorders;   ?sleep disorders;   ?substance-use disorders;   ?family history of suicide - not applicable.;  ?family history of psychiatric illness;   ?psychotic disorders.      Psychological Characteristics:     ?hopelessness;   ?belongingness;   ?perceived burdensomeness;   ?acquired capability for suicide;   ?impulsivity;   ?problem-solving deficits;   ?shame;   ?guilt.      Psychosocial Stressors:    ?relationship problems;   ?legal problems;   ?financial problems;   ?work related problems;   ?lack of social support.      Physical Injury or Illness:    ?TBI;   ?physical injury;   ?chronic pain;   ?other medical problems…  Other Risk Factors:    ?male;   ? ;   ?access to lethal means;   ?combat exposure;   ?history of physical, emotional, mental and or sexual abuse;   ?sexual orientation;   ?mental health stigma and perceived barriers to care;   ?recent local cluster of suicides (consider possible contagion)       Current Protective Factors:   Psychiatric History and Status:    ?compliance with psychiatric medication;   ?engagement in evidenced-based treatment;   ?motivation and readiness to change;   ?insight about problems.      Psychological Characteristics:    ?problem solving and effective coping strategies;   ?resilience;   ?reasons for living;   ?future orientation;   ?perceived internal locus of control.     Psychosocial Factors:     ?healthy intimate relationships;   ?social support and community involvement.      Physical Injury or Illness:    ?medical  compliance;   ?able to access care as needed;   ?support for help seeking.       Other Protective Factors:    ?restricted access to lethal means;   ?Orthodox/spirituality,   ?crisis response or other related training.        Risk Level: Not Currently at Clinically Significant Risk  Hospitalization is not deemed necessary at this time as the patient does not present a clear or imminent danger to self or others. No indication for pursuing higher level of care. Outpatient management is currently most appropriate and least restrictive level of care.     Current risk:   SUICIDE: Low   Homicide: Not applicable   Self-harm: Not applicable   Relapse: Not applicable   Other:    Safety Plan reviewed? No   If evidence of imminent risk is present, intervention/plan:       Treatment Goal(s)/Objective(s) addressed:     Goal: Reduce symptoms of hypomania and/or anabell    Explore mood state, level  of energy,  level of control over thoughts, and sleeping pattern.  Monitor taking of  psychotropic medications as directed.  Encourage trust in the therapy relationship by sharing fears about dependency, loss, and abandonment.  Accomplish mood stability, having slower reaction with anger, less expansive, and being more socially appropriate and sensitive.  Identify and list positive traits and behaviors that help build genuine self esteem.  Lower grandiose statements and learn to express self more realistically.  Learn to be less agitated and distracted, and be able to sit quietly and calmly for 30 minutes.  Agree to sleep about 5 hours or more per night.      Progress toward Treatment Goals: Mild improvement    Plan:    1) The patient will return to the clinic 2-3 weeks - Next appointment scheduled:  8/29/2018  2) Crisis Response Plan:  Reviewed emergency resources with the patient and the patient expressed understanding including:  If feeling suicidal, patient will call or present to the [facility] during duty hours or  present to closest ED ([facility] or [facility], call 911 or crisis hotline ([phone]) after duty hours.  3) Referrals/Consults:  N/A  4) Barriers to Learning:  No  5) Readiness to Learn:  Yes  6) Cultural Concerns:  No  7) Patient voiced understanding of, and agreement with, plan and goals as annotated above.  8) Declare these services are medically necessary and appropriate to the patient’s diagnosis and needs  9) The point of contact at the [facility] regarding this evaluation is Dr. Goldsmith, Psychologist.      Martín Goldsmith III, Ed.D.  8/29/2018

## 2018-08-31 NOTE — PROGRESS NOTES
Outcome: Left Message    Please transfer to Patient Outreach Team at [phone] when patient returns call.      Attempt # 2

## 2018-09-14 NOTE — PROGRESS NOTES
Outcome: Left Message    Please transfer to Patient Outreach Team at [phone] when patient returns call.      Attempt # 3

## 2018-09-17 NOTE — PROGRESS NOTES
1. Attempt #:Final    2. WebIZ Checked & Epic Updated: Yes  3. HealthConnect Verified: yes  4. Verify PCP: yes    5. Communication Preference Obtained: yes    6. Diabetes Visit Scheduling  Scheduling Status:Scheduled/already schedule      7. Care Gap Scheduling (Attempt to Schedule EACH Overdue Care Gap!)    Health Maintenance Due   Topic Date Due   • IMM ZOSTER VACCINES (1 of 2) 01/21/2014   • IMM INFLUENZA (1) 09/01/2018        8. Patient was directed to Health and Wellness Website: no     - Patient declines Immunizations: SHINGRIX (Shingles).    9. Screened for Food Pantry Prescription? yes  10. Rudder Activation: declined  11. IDX [place]: no  12. Virtual Visits: no  13. Opt In to Text Messages: no

## 2018-10-04 ENCOUNTER — PATIENT OUTREACH (OUTPATIENT)
Dept: HEALTH INFORMATION MANAGEMENT | Facility: OTHER | Age: 53
End: 2018-10-04

## 2018-10-08 ENCOUNTER — OFFICE VISIT (OUTPATIENT)
Dept: BEHAVIORAL HEALTH | Facility: CLINIC | Age: 53
End: 2018-10-08
Payer: MEDICARE

## 2018-10-08 DIAGNOSIS — F31.62 BIPOLAR DISORDER, CURRENT EPISODE MIXED, MODERATE (HCC): ICD-10-CM

## 2018-10-08 PROCEDURE — 90834 PSYTX W PT 45 MINUTES: CPT | Performed by: PSYCHOLOGIST

## 2018-10-15 ENCOUNTER — HOSPITAL ENCOUNTER (OUTPATIENT)
Dept: LAB | Facility: MEDICAL CENTER | Age: 53
End: 2018-10-15
Attending: NURSE PRACTITIONER
Payer: MEDICARE

## 2018-10-15 LAB
EST. AVERAGE GLUCOSE BLD GHB EST-MCNC: 192 MG/DL
HBA1C MFR BLD: 8.3 % (ref 0–5.6)

## 2018-10-15 PROCEDURE — 83036 HEMOGLOBIN GLYCOSYLATED A1C: CPT

## 2018-10-15 PROCEDURE — 80053 COMPREHEN METABOLIC PANEL: CPT

## 2018-10-15 PROCEDURE — 36415 COLL VENOUS BLD VENIPUNCTURE: CPT

## 2018-10-16 LAB
ALBUMIN SERPL BCP-MCNC: 4.1 G/DL (ref 3.2–4.9)
ALBUMIN/GLOB SERPL: 1.9 G/DL
ALP SERPL-CCNC: 83 U/L (ref 30–99)
ALT SERPL-CCNC: 12 U/L (ref 2–50)
ANION GAP SERPL CALC-SCNC: 6 MMOL/L (ref 0–11.9)
AST SERPL-CCNC: 13 U/L (ref 12–45)
BILIRUB SERPL-MCNC: 0.2 MG/DL (ref 0.1–1.5)
BUN SERPL-MCNC: 13 MG/DL (ref 8–22)
CALCIUM SERPL-MCNC: 9.5 MG/DL (ref 8.5–10.5)
CHLORIDE SERPL-SCNC: 107 MMOL/L (ref 96–112)
CO2 SERPL-SCNC: 26 MMOL/L (ref 20–33)
CREAT SERPL-MCNC: 0.88 MG/DL (ref 0.5–1.4)
GLOBULIN SER CALC-MCNC: 2.2 G/DL (ref 1.9–3.5)
GLUCOSE SERPL-MCNC: 228 MG/DL (ref 65–99)
POTASSIUM SERPL-SCNC: 4.1 MMOL/L (ref 3.6–5.5)
PROT SERPL-MCNC: 6.3 G/DL (ref 6–8.2)
SODIUM SERPL-SCNC: 139 MMOL/L (ref 135–145)

## 2018-10-22 ENCOUNTER — OFFICE VISIT (OUTPATIENT)
Dept: BEHAVIORAL HEALTH | Facility: CLINIC | Age: 53
End: 2018-10-22
Payer: MEDICARE

## 2018-10-22 ENCOUNTER — OFFICE VISIT (OUTPATIENT)
Dept: MEDICAL GROUP | Facility: MEDICAL CENTER | Age: 53
End: 2018-10-22
Payer: MEDICARE

## 2018-10-22 VITALS
TEMPERATURE: 98.5 F | HEIGHT: 68 IN | WEIGHT: 133.16 LBS | SYSTOLIC BLOOD PRESSURE: 116 MMHG | OXYGEN SATURATION: 96 % | DIASTOLIC BLOOD PRESSURE: 62 MMHG | BODY MASS INDEX: 20.18 KG/M2 | HEART RATE: 98 BPM

## 2018-10-22 DIAGNOSIS — E78.5 DYSLIPIDEMIA: ICD-10-CM

## 2018-10-22 DIAGNOSIS — F31.62 BIPOLAR DISORDER, CURRENT EPISODE MIXED, MODERATE (HCC): ICD-10-CM

## 2018-10-22 DIAGNOSIS — Z23 NEED FOR VACCINATION: ICD-10-CM

## 2018-10-22 DIAGNOSIS — F31.81 BIPOLAR 2 DISORDER, MAJOR DEPRESSIVE EPISODE (HCC): ICD-10-CM

## 2018-10-22 PROCEDURE — 99214 OFFICE O/P EST MOD 30 MIN: CPT | Mod: 25 | Performed by: NURSE PRACTITIONER

## 2018-10-22 PROCEDURE — 90834 PSYTX W PT 45 MINUTES: CPT | Performed by: PSYCHOLOGIST

## 2018-10-22 PROCEDURE — 90686 IIV4 VACC NO PRSV 0.5 ML IM: CPT | Performed by: NURSE PRACTITIONER

## 2018-10-22 PROCEDURE — G0008 ADMIN INFLUENZA VIRUS VAC: HCPCS | Performed by: NURSE PRACTITIONER

## 2018-10-22 RX ORDER — ATORVASTATIN CALCIUM 10 MG/1
10 TABLET, FILM COATED ORAL DAILY
Qty: 90 TAB | Refills: 3 | Status: SHIPPED | OUTPATIENT
Start: 2018-10-22 | End: 2019-10-16 | Stop reason: SDUPTHER

## 2018-10-22 RX ORDER — LISINOPRIL 2.5 MG/1
2.5 TABLET ORAL DAILY
Qty: 90 TAB | Refills: 3 | Status: SHIPPED | OUTPATIENT
Start: 2018-10-22 | End: 2019-10-16 | Stop reason: SDUPTHER

## 2018-10-22 RX ORDER — METFORMIN HYDROCHLORIDE 500 MG/1
500 TABLET, EXTENDED RELEASE ORAL DAILY
Qty: 30 TAB | Refills: 11
Start: 2018-10-22 | End: 2019-01-30 | Stop reason: SDUPTHER

## 2018-10-22 NOTE — BH THERAPY
[facility]  Therapy Progress Note    Patient Name: Celso Rose  Patient MRN: [id number]  Today's Date: 10/22/2018     Type of session:Individual psychotherapy  Length of session: 45 minutes  Persons in attendance:Patient    Subjective/New Info: The patient ID/Chief Complaint:  The patient is a 53 year old male, , .  The patient referred by Louis Brennan A.P.N. and voluntarily presented for an individual intake to address chronic depressive and some hypomania events denies history of anabell.  Reviewed limits of confidentiality and [facility] policies; patient expressed understanding and agreed to voluntarily proceed with evaluation and treatment.    Interval History:   Session Focus: The patient's estimated global assessment of functioning indicates a mild level of difficulty with some problems in relationships and health functioning. The patient is nonetheless functioning well and has some significant relationships. Discussed with patient using problem-solving ways to effectively deal with negative thoughts about his interactions with his father and ways to increase his behavioral activation for the management of the diabetes. Provided education about A1C in relation to harm-reduction. Encouraged the patient to discuss the changes he has made to his medication with his provider.    Therapeutic Intervention: Cognitive Behavioral Therapy      Planned Intervention: Review the result of psychological testing and adjust the treatment plan/ patient will complete daily mood charting    Objective/Observations:  Mental Status    Patient did not present in acute distress. Patient was appropriately groomed. Patient was alert and oriented x4. Eye contact was appropriate. No abnormalities in attention or concentration were noted. No abnormalities of movement present; psychomotor activity was normal. Speech was fluent and regular in rhythm, rate, volume, and tone. Thought  processes Linear, Logical and Goal Directed. There was no evidence of thought disorder. No auditory or visual hallucinations. Long and short term memory appeared to be intact. Insight, judgment, and impulse control were deemed to be fair.  Reported mood was “depressed.” Affect was full-ranging and appropriate to thought content and conversation.  Patient denied current suicidal and homicidal ideation in plan, intent, and preparatory behavior.      Diagnoses:   1. Bipolar disorder, current episode mixed, moderate (HCC)     Risk Assessment:      The patient denied any suicide-related ideation and/or behaviors and intent/plan, denied thoughts about death and dying both in session and during the period since last appointment, or past 2 weeks.  Current Risk and Protective Factors:  Psychiatric History and Current Status:    ?history of suicide attempt < 3 months;   ?history of suicide attempt   ?history of psychiatric inpatient care;   ?history of non-suicidal self-injurious behaviors;   ?depression or other mood disorders;   ?personality disorders or traits;   ?PTSD or anxiety disorders;   ?sleep disorders;   ?substance-use disorders;   ?family history of suicide - not applicable.;  ?family history of psychiatric illness;   ?psychotic disorders.      Psychological Characteristics:     ?hopelessness;   ?belongingness;   ?perceived burdensomeness;   ?acquired capability for suicide;   ?impulsivity;   ?problem-solving deficits;   ?shame;   ?guilt.      Psychosocial Stressors:    ?relationship problems;   ?legal problems;   ?financial problems;   ?work related problems;   ?lack of social support.      Physical Injury or Illness:    ?TBI;   ?physical injury;   ?chronic pain;   ?other medical problems…  Other Risk Factors:    ?male;   ? ;   ?access to lethal means;   ?combat exposure;   ?history of physical, emotional, mental and or sexual abuse;   ?sexual orientation;   ?mental health stigma and perceived  barriers to care;   ?recent local cluster of suicides (consider possible contagion)       Current Protective Factors:   Psychiatric History and Status:    ?compliance with psychiatric medication;   ?engagement in evidenced-based treatment;   ?motivation and readiness to change;   ?insight about problems.      Psychological Characteristics:    ?problem solving and effective coping strategies;   ?resilience;   ?reasons for living;   ?future orientation;   ?perceived internal locus of control.     Psychosocial Factors:     ?healthy intimate relationships;   ?social support and community involvement.      Physical Injury or Illness:    ?medical compliance;   ?able to access care as needed;   ?support for help seeking.       Other Protective Factors:    ?restricted access to lethal means;   ?Scientology/spirituality,   ?crisis response or other related training.        Risk Level: Not Currently at Clinically Significant Risk  Hospitalization is not deemed necessary at this time as the patient does not present a clear or imminent danger to self or others. No indication for pursuing higher level of care. Outpatient management is currently most appropriate and least restrictive level of care.     Current risk:   SUICIDE: Low   Homicide: Not applicable   Self-harm: Not applicable   Relapse: Not applicable   Other:    Safety Plan reviewed? No   If evidence of imminent risk is present, intervention/plan:       Treatment Goal(s)/Objective(s) addressed:     Goal: Reduce symptoms of hypomania and/or anabell    Explore mood state, level  of energy,  level of control over thoughts, and sleeping pattern.  Monitor taking of  psychotropic medications as directed.  Encourage trust in the therapy relationship by sharing fears about dependency, loss, and abandonment.  Accomplish mood stability, having slower reaction with anger, less expansive, and being more socially appropriate and sensitive.  Identify and list positive traits and behaviors  that help build genuine self esteem.  Lower grandiose statements and learn to express self more realistically.  Learn to be less agitated and distracted, and be able to sit quietly and calmly for 30 minutes.  Agree to sleep about 5 hours or more per night.      Progress toward Treatment Goals: Mild improvement    Plan:    1) The patient will return to the clinic 2-3 weeks.  2) Crisis Response Plan:  Reviewed emergency resources with the patient and the patient expressed understanding including:  If feeling suicidal, patient will call or present to the [facility] during duty hours or present to closest ED ([facility] or [facility], call 911 or crisis hotline ([phone]) after duty hours.  3) Referrals/Consults:  N/A  4) Barriers to Learning:  No  5) Readiness to Learn:  Yes  6) Cultural Concerns:  No  7) Patient voiced understanding of, and agreement with, plan and goals as annotated above.  8) Declare these services are medically necessary and appropriate to the patient’s diagnosis and needs  9) The point of contact at the [facility] regarding this evaluation is Dr. Goldsmith, Psychologist.      Martín Goldsmith III, Ed.D.  10/22/2018

## 2018-10-22 NOTE — PROGRESS NOTES
Subjective:      Celso Rose is a 53 y.o. male who presents with Follow-Up        CC: Patient is here today for follow-up on lab work related to dyslipidemia and diabetes.    HPI Celso Rose      1. Uncontrolled type 2 diabetes mellitus without complication, with long-term current use of insulin (HCC)  On patient's initial visit earlier this year his hemoglobin A1c was high at 14.3 and he has been resistant to starting on medication.  I have spoken with patient multiple times at the office and he is also now going to counseling and with this he has started to be compliant with taking his medicines.  He states he is taking his Lantus insulin 15 units twice a day but only taking once a day his metformin because he felt he had a hypoglycemic episode.  Subsequently his hemoglobin A1c's are gradually going down and is currently at 8.3.  He has been reluctant to go on an ACE inhibitor.    2. Need for vaccination  Patient due for flu shot.    3. Bipolar 2 disorder, major depressive episode (HCC)  Patient currently in counseling with the psychologist and feels it is helpful and he has been more compliant with his health care as well.      4. Dyslipidemia  Patient is not on a statin because he refused in the past to take medicine for cholesterol despite his diabetes.  He is now amenable to starting on a statin.  Social History   Substance Use Topics   • Smoking status: Current Every Day Smoker     Packs/day: 1.00     Years: 1.00     Types: Cigarettes   • Smokeless tobacco: Never Used      Comment: started smoking at age 14   • Alcohol use No     Current Outpatient Prescriptions   Medication Sig Dispense Refill   • metFORMIN ER (GLUCOPHAGE XR) 500 MG TABLET SR 24 HR Take 1 Tab by mouth every day. 30 Tab 11   • atorvastatin (LIPITOR) 10 MG Tab Take 1 Tab by mouth every day. 90 Tab 3   • lisinopril (PRINIVIL) 2.5 MG Tab Take 1 Tab by mouth every day. 90 Tab 3   • insulin glargine (LANTUS SOLOSTAR) 100 UNIT/ML  "Solution Pen-injector injection Inject 30 Units as instructed every evening. 1 PEN 11   • Blood Glucose Monitoring Suppl Supplies Misc Test strips order: Test strips for Bernard Contour Next meter. Sig: use 3 times daily and prn ssx high or low sugar 100 Strip 6   • Misc. Devices Misc lantus nanno pen needles to use QD 30 Each 11     No current facility-administered medications for this visit.    History reviewed. No pertinent past medical history.   Family History   Problem Relation Age of Onset   • Heart Disease Mother    • Diabetes Father    • Hypertension Sister    • Alcohol/Drug Sister        Review of Systems   All other systems reviewed and are negative.         Objective:     /62 (BP Location: Right arm, Patient Position: Sitting, BP Cuff Size: Adult)   Pulse 98   Temp 36.9 °C (98.5 °F) (Temporal)   Ht 1.727 m (5' 8\")   Wt 60.4 kg (133 lb 2.5 oz)   SpO2 96%   BMI 20.25 kg/m²      Physical Exam   Constitutional: He is oriented to person, place, and time. He appears well-developed and well-nourished. No distress.   HENT:   Head: Normocephalic and atraumatic.   Right Ear: External ear normal.   Left Ear: External ear normal.   Nose: Nose normal.   Mouth/Throat: Oropharynx is clear and moist.   Eyes: Conjunctivae are normal. Right eye exhibits no discharge. Left eye exhibits no discharge.   Neck: Normal range of motion. Neck supple. No tracheal deviation present. No thyromegaly present.   Cardiovascular: Normal rate, regular rhythm and normal heart sounds.    No murmur heard.  Pulmonary/Chest: Effort normal and breath sounds normal. No respiratory distress. He has no wheezes. He has no rales.   Lymphadenopathy:     He has no cervical adenopathy.   Neurological: He is alert and oriented to person, place, and time. Coordination normal.   Skin: Skin is warm and dry. No rash noted. He is not diaphoretic. No erythema.   Psychiatric: He has a normal mood and affect. His behavior is normal. Judgment and " thought content normal.   Nursing note and vitals reviewed.              Assessment/Plan:     1. Uncontrolled type 2 diabetes mellitus without complication, with long-term current use of insulin (HCC)  Patient is not yet at goal but shows great improvement from his previous hemoglobin A1c 9 months ago at 14.3 and his current hemoglobin A1c of 8.3.  I told him to start back on his metformin at 500 mg a day and continue with his Lantus twice a day 15 units.  He was agreeable to start on a low-dose ACE inhibitor for kidney protection.  I will see him back in 3months with a goal of getting his hemoglobin A1c below 7.0.  - metFORMIN ER (GLUCOPHAGE XR) 500 MG TABLET SR 24 HR; Take 1 Tab by mouth every day.  Dispense: 30 Tab; Refill: 11  - lisinopril (PRINIVIL) 2.5 MG Tab; Take 1 Tab by mouth every day.  Dispense: 90 Tab; Refill: 3  - COMP METABOLIC PANEL; Future  - HEMOGLOBIN A1C; Future    2. Need for vaccination  I have placed the below orders and discussed them with an approved delegating provider. The MA is performing the below orders under the direction of Dr. Bolivar    - Influenza Vaccine Quad Injection >3Y (PF)    3. Bipolar 2 disorder, major depressive episode (HCC)  Patient appears to be doing well with counseling.  I am unable to visualize his notes from the psychologist office but he feels it has been helpful and patient is more amenable to taking medicines which are needed for his health.    4. Dyslipidemia  Patient will start on low-dose Lipitor because of his high risk status with diabetes and smoking.  He will stop the medicine if he develops myalgias or other side effects.  I will do lab work in 3 months.  - atorvastatin (LIPITOR) 10 MG Tab; Take 1 Tab by mouth every day.  Dispense: 90 Tab; Refill: 3  - LIPID PROFILE; Future

## 2018-11-05 ENCOUNTER — OFFICE VISIT (OUTPATIENT)
Dept: BEHAVIORAL HEALTH | Facility: CLINIC | Age: 53
End: 2018-11-05
Payer: MEDICARE

## 2018-11-05 DIAGNOSIS — F31.81 BIPOLAR 2 DISORDER, MAJOR DEPRESSIVE EPISODE (HCC): ICD-10-CM

## 2018-11-05 DIAGNOSIS — F41.1 GAD (GENERALIZED ANXIETY DISORDER): ICD-10-CM

## 2018-11-05 PROCEDURE — 90834 PSYTX W PT 45 MINUTES: CPT | Performed by: PSYCHOLOGIST
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
## 2018-11-06 ENCOUNTER — OFFICE VISIT (OUTPATIENT)
Dept: BEHAVIORAL HEALTH | Facility: CLINIC | Age: 53
End: 2018-11-06
Payer: MEDICARE

## 2018-11-06 VITALS
HEIGHT: 68 IN | SYSTOLIC BLOOD PRESSURE: 118 MMHG | DIASTOLIC BLOOD PRESSURE: 66 MMHG | BODY MASS INDEX: 20.16 KG/M2 | WEIGHT: 133 LBS | HEART RATE: 92 BPM | RESPIRATION RATE: 18 BRPM

## 2018-11-06 DIAGNOSIS — F12.20 CANNABIS USE DISORDER, SEVERE, DEPENDENCE (HCC): ICD-10-CM

## 2018-11-06 DIAGNOSIS — F31.81 BIPOLAR 2 DISORDER (HCC): ICD-10-CM

## 2018-11-06 PROCEDURE — 99205 OFFICE O/P NEW HI 60 MIN: CPT | Performed by: PSYCHIATRY & NEUROLOGY

## 2018-11-06 RX ORDER — OXCARBAZEPINE 300 MG/1
300 TABLET, FILM COATED ORAL
Qty: 30 TAB | Refills: 1 | Status: SHIPPED | OUTPATIENT
Start: 2018-11-06 | End: 2019-01-30

## 2018-11-06 ASSESSMENT — PATIENT HEALTH QUESTIONNAIRE - PHQ9: CLINICAL INTERPRETATION OF PHQ2 SCORE: 0
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
## 2018-12-07 ENCOUNTER — APPOINTMENT (OUTPATIENT)
Dept: BEHAVIORAL HEALTH | Facility: CLINIC | Age: 53
End: 2018-12-07
Payer: MEDICARE

## 2018-12-10 ENCOUNTER — APPOINTMENT (OUTPATIENT)
Dept: BEHAVIORAL HEALTH | Facility: CLINIC | Age: 53
End: 2018-12-10
Payer: MEDICARE

## 2019-01-25 ENCOUNTER — HOSPITAL ENCOUNTER (OUTPATIENT)
Dept: LAB | Facility: MEDICAL CENTER | Age: 54
End: 2019-01-25
Attending: NURSE PRACTITIONER
Payer: MEDICARE

## 2019-01-25 DIAGNOSIS — E78.5 DYSLIPIDEMIA: ICD-10-CM

## 2019-01-25 LAB
ALBUMIN SERPL BCP-MCNC: 4.2 G/DL (ref 3.2–4.9)
ALBUMIN/GLOB SERPL: 1.8 G/DL
ALP SERPL-CCNC: 93 U/L (ref 30–99)
ALT SERPL-CCNC: 15 U/L (ref 2–50)
ANION GAP SERPL CALC-SCNC: 7 MMOL/L (ref 0–11.9)
AST SERPL-CCNC: 15 U/L (ref 12–45)
BILIRUB SERPL-MCNC: 0.6 MG/DL (ref 0.1–1.5)
BUN SERPL-MCNC: 12 MG/DL (ref 8–22)
CALCIUM SERPL-MCNC: 9.7 MG/DL (ref 8.5–10.5)
CHLORIDE SERPL-SCNC: 109 MMOL/L (ref 96–112)
CHOLEST SERPL-MCNC: 139 MG/DL (ref 100–199)
CO2 SERPL-SCNC: 26 MMOL/L (ref 20–33)
CREAT SERPL-MCNC: 0.92 MG/DL (ref 0.5–1.4)
EST. AVERAGE GLUCOSE BLD GHB EST-MCNC: 209 MG/DL
GLOBULIN SER CALC-MCNC: 2.4 G/DL (ref 1.9–3.5)
GLUCOSE SERPL-MCNC: 70 MG/DL (ref 65–99)
HBA1C MFR BLD: 8.9 % (ref 0–5.6)
HDLC SERPL-MCNC: 40 MG/DL
LDLC SERPL CALC-MCNC: 89 MG/DL
POTASSIUM SERPL-SCNC: 3.6 MMOL/L (ref 3.6–5.5)
PROT SERPL-MCNC: 6.6 G/DL (ref 6–8.2)
SODIUM SERPL-SCNC: 142 MMOL/L (ref 135–145)
TRIGL SERPL-MCNC: 51 MG/DL (ref 0–149)

## 2019-01-25 PROCEDURE — 83036 HEMOGLOBIN GLYCOSYLATED A1C: CPT

## 2019-01-25 PROCEDURE — 36415 COLL VENOUS BLD VENIPUNCTURE: CPT

## 2019-01-25 PROCEDURE — 80053 COMPREHEN METABOLIC PANEL: CPT

## 2019-01-25 PROCEDURE — 80061 LIPID PANEL: CPT

## 2019-01-30 ENCOUNTER — OFFICE VISIT (OUTPATIENT)
Dept: MEDICAL GROUP | Facility: MEDICAL CENTER | Age: 54
End: 2019-01-30
Payer: MEDICARE

## 2019-01-30 ENCOUNTER — HOSPITAL ENCOUNTER (OUTPATIENT)
Facility: MEDICAL CENTER | Age: 54
End: 2019-01-30
Attending: NURSE PRACTITIONER
Payer: MEDICARE

## 2019-01-30 VITALS
OXYGEN SATURATION: 97 % | BODY MASS INDEX: 20.4 KG/M2 | HEART RATE: 90 BPM | SYSTOLIC BLOOD PRESSURE: 126 MMHG | WEIGHT: 130 LBS | HEIGHT: 67 IN | RESPIRATION RATE: 16 BRPM | DIASTOLIC BLOOD PRESSURE: 70 MMHG

## 2019-01-30 DIAGNOSIS — R30.0 DYSURIA: ICD-10-CM

## 2019-01-30 DIAGNOSIS — F31.81 BIPOLAR 2 DISORDER, MAJOR DEPRESSIVE EPISODE (HCC): ICD-10-CM

## 2019-01-30 DIAGNOSIS — R31.21 ASYMPTOMATIC MICROSCOPIC HEMATURIA: ICD-10-CM

## 2019-01-30 DIAGNOSIS — F12.20 CANNABIS DEPENDENCE, CONTINUOUS (HCC): ICD-10-CM

## 2019-01-30 DIAGNOSIS — F60.6 AVOIDANT PERSONALITY DISORDER IN ADULT (HCC): ICD-10-CM

## 2019-01-30 PROBLEM — F12.90 MARIJUANA USE: Status: RESOLVED | Noted: 2018-04-12 | Resolved: 2019-01-30

## 2019-01-30 LAB
APPEARANCE UR: CLEAR
APPEARANCE UR: NORMAL
BILIRUB UR QL STRIP.AUTO: NEGATIVE
BILIRUB UR STRIP-MCNC: NORMAL MG/DL
COLOR UR AUTO: YELLOW
COLOR UR: YELLOW
GLUCOSE UR STRIP.AUTO-MCNC: NEGATIVE MG/DL
GLUCOSE UR STRIP.AUTO-MCNC: NORMAL MG/DL
KETONES UR STRIP.AUTO-MCNC: NEGATIVE MG/DL
KETONES UR STRIP.AUTO-MCNC: NORMAL MG/DL
LEUKOCYTE ESTERASE UR QL STRIP.AUTO: NEGATIVE
LEUKOCYTE ESTERASE UR QL STRIP.AUTO: NORMAL
MICRO URNS: ABNORMAL
NITRITE UR QL STRIP.AUTO: NEGATIVE
NITRITE UR QL STRIP.AUTO: NORMAL
PH UR STRIP.AUTO: 5.5 [PH] (ref 5–8)
PH UR STRIP.AUTO: 7 [PH]
PROT UR QL STRIP: NEGATIVE MG/DL
PROT UR QL STRIP: NORMAL MG/DL
RBC UR QL AUTO: ABNORMAL
RBC UR QL AUTO: NORMAL
SP GR UR STRIP.AUTO: 1.02
SP GR UR STRIP.AUTO: 1.02
UROBILINOGEN UR STRIP-MCNC: 0.2 MG/DL
UROBILINOGEN UR STRIP.AUTO-MCNC: 0.2 MG/DL

## 2019-01-30 PROCEDURE — 99214 OFFICE O/P EST MOD 30 MIN: CPT | Performed by: NURSE PRACTITIONER

## 2019-01-30 PROCEDURE — 81002 URINALYSIS NONAUTO W/O SCOPE: CPT | Performed by: NURSE PRACTITIONER

## 2019-01-30 PROCEDURE — 81001 URINALYSIS AUTO W/SCOPE: CPT

## 2019-01-30 PROCEDURE — 8041 PR SCP AHA: Performed by: NURSE PRACTITIONER

## 2019-01-30 RX ORDER — METFORMIN HYDROCHLORIDE 500 MG/1
500 TABLET, EXTENDED RELEASE ORAL 2 TIMES DAILY
Qty: 60 TAB | Refills: 11 | Status: SHIPPED | OUTPATIENT
Start: 2019-01-30 | End: 2019-10-16 | Stop reason: SDUPTHER

## 2019-01-30 ASSESSMENT — LIFESTYLE VARIABLES: SUBSTANCE_ABUSE: 1

## 2019-01-30 NOTE — PROGRESS NOTES
Annual Health Assessment Questions:    1.  Are you currently engaging in any exercise or physical activity? Yes    2.  How would you describe your mood or emotional well-being today? good    3.  Have you had any falls in the last year? Yes    4.  Have you noticed any problems with your balance or had difficulty walking? Yes at times    5.  In the last six months have you experienced any leakage of urine? Yes    6. DPA/Advanced Directive: Patient has Advanced Directive on file.

## 2019-01-30 NOTE — PROGRESS NOTES
Subjective:      Celso Rose is a 54 y.o. male who presents with Follow-Up (3 month)        CC: Patient is here today for AHA visit as well as follow-up on diabetes and complaints of dysuria and reports a prior kidney issues.    HPI Celso Rose      1. Uncontrolled type 2 diabetes mellitus without complication, with long-term current use of insulin ([facility])  Patient's hemoglobin A1c at his initial visit last year was high at 14.3 and patient was very adamant about not taking medications.  Eventually he was convinced to start on Lantus insulin which he has been using 30 units a day.  His hemoglobin A1c gradually declined to 9.0 and he agreed to go on metformin once a day.  His hemoglobin A1c continued to improve to 8.3 but has jumped back up to 8.9 despite the 2 medications.  He admits he has been eating more carbohydrates than usual and is drinking a large [employer] drink in the office today.    2. 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    3. Bipolar 2 disorder, major depressive episode ([facility])  Patient reports that he did stop his Trileptal because he did not feel it was helpful and has not rescheduled with psychology.    4. Cannabis dependence, continuous ([facility])  Patient admits he continues to smoke marijuana daily and feels he is addicted to it but does not want to go into counseling or rehab.    5. Dysuria  Patient reports he has had some mild dysuria but no penile discharge, urgency or frequency.  He denies testicular pain and states she is not sexually active.  His urine dip in the office was negative except for small.    6. Asymptomatic microscopic hematuria  Patient's urine dip in the office today showed small blood.  He states that about 10 years ago he was found to have hematuria while living in [place].  He states he had a cystoscopy and ultrasound and was found to have what appears to be an atrophic kidney.  He has had  "no problems that he is aware of since.  He does smoke cigarettes and marijuana.  His GFR has been normal at greater than 60.  Urine check in January of last year was negative for blood.  Current Outpatient Prescriptions   Medication Sig Dispense Refill   • metFORMIN ER (GLUCOPHAGE XR) 500 MG TABLET SR 24 HR Take 1 Tab by mouth 2 times a day. 60 Tab 11   • atorvastatin (LIPITOR) 10 MG Tab Take 1 Tab by mouth every day. 90 Tab 3   • lisinopril (PRINIVIL) 2.5 MG Tab Take 1 Tab by mouth every day. 90 Tab 3   • insulin glargine (LANTUS SOLOSTAR) 100 UNIT/ML Solution Pen-injector injection Inject 30 Units as instructed every evening. 1 PEN 11   • Blood Glucose Monitoring Suppl Supplies Misc Test strips order: Test strips for Bernard Contour Next meter. Sig: use 3 times daily and prn ssx high or low sugar 100 Strip 6   • Misc. Devices Misc lantus nanno pen needles to use QD 30 Each 11     No current facility-administered medications for this visit.      Social History   Substance Use Topics   • Smoking status: Current Every Day Smoker     Packs/day: 1.00     Years: 1.00     Types: Cigarettes   • Smokeless tobacco: Never Used      Comment: started smoking at age 14   • Alcohol use No     Past Medical History:   Diagnosis Date   • Bipolar disorder (HCC)      Family History   Problem Relation Age of Onset   • Heart Disease Mother    • Diabetes Father    • Hypertension Sister    • Alcohol/Drug Sister        Review of Systems   Genitourinary: Positive for dysuria.   Psychiatric/Behavioral: Positive for substance abuse.   All other systems reviewed and are negative.         Objective:     /70 (BP Location: Right arm, Patient Position: Sitting, BP Cuff Size: Adult)   Pulse 90   Resp 16   Ht 1.702 m (5' 7\")   Wt 59 kg (130 lb)   SpO2 97%   BMI 20.36 kg/m²      Physical Exam   Constitutional: He is oriented to person, place, and time. He appears well-developed and well-nourished. No distress.   HENT:   Head: Normocephalic " and atraumatic.   Right Ear: External ear normal.   Left Ear: External ear normal.   Nose: Nose normal.   Mouth/Throat: Oropharynx is clear and moist.   Eyes: Conjunctivae are normal. Right eye exhibits no discharge. Left eye exhibits no discharge.   Neck: Normal range of motion. Neck supple. No tracheal deviation present. No thyromegaly present.   Cardiovascular: Normal rate, regular rhythm and normal heart sounds.    No murmur heard.  Pulmonary/Chest: Effort normal and breath sounds normal. No respiratory distress. He has no wheezes. He has no rales.   Lymphadenopathy:     He has no cervical adenopathy.   Neurological: He is alert and oriented to person, place, and time. Coordination normal.   Skin: Skin is warm and dry. No rash noted. He is not diaphoretic. No erythema.   Psychiatric: He has a normal mood and affect. His behavior is normal. Judgment and thought content normal.   Nursing note and vitals reviewed.            Annual Health Assessment Questions:    1.  Are you currently engaging in any exercise or physical activity? Yes    2.  How would you describe your mood or emotional well-being today? good    3.  Have you had any falls in the last year? Yes    4.  Have you noticed any problems with your balance or had difficulty walking? Yes at times    5.  In the last six months have you experienced any leakage of urine? Yes    6. DPA/Advanced Directive: Patient has Advanced Directive on file.   Assessment/Plan:     1. Uncontrolled type 2 diabetes mellitus without complication, with long-term current use of insulin (HCC)  Patient's hemoglobin A1c although improved since last year, is still elevated.  I have asked him to increase his metformin to twice a day at 500 mg and he will continue with his insulin.  His GFR is normal at greater than 60.  He is to try to decrease his carbohydrate intake so we does not need to use as much medication.  I explained our goal is a hemoglobin A1c below 7.0.  - metFORMIN ER  (GLUCOPHAGE XR) 500 MG TABLET SR 24 HR; Take 1 Tab by mouth 2 times a day.  Dispense: 60 Tab; Refill: 11    2. 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    3. Bipolar 2 disorder, major depressive episode ([facility])  Patient has stopped using medication but states he might be willing to see the psychologist again    4. Cannabis dependence, continuous ([facility])  I advised patient strongly to wean off marijuana but I do not think he will do so.  He does not want going to rehab but may be willing to go back to his counselor.    5. Dysuria  Urine dip in the office did not show any evidence of UTI.  - POCT Urinalysis  - URINALYSIS,CULTURE IF INDICATED; Future  - US-RENAL; Future  - REFERRAL TO UROLOGY    6. Asymptomatic microscopic hematuria  Patient tells me today that he did have some issues with 1 of his kidneys years ago so I am going to order an ultrasound and refer him to urology because he is at risk for bladder cancer because of his long-term tobacco and marijuana usage.  - US-RENAL; Future  - REFERRAL TO UROLOGY

## 2019-01-31 LAB
BACTERIA #/AREA URNS HPF: ABNORMAL /HPF
EPI CELLS #/AREA URNS HPF: ABNORMAL /HPF
MUCOUS THREADS #/AREA URNS HPF: ABNORMAL /HPF
RBC # URNS HPF: ABNORMAL /HPF
WBC #/AREA URNS HPF: ABNORMAL /HPF

## 2019-03-01 ENCOUNTER — OFFICE VISIT (OUTPATIENT)
Dept: URGENT CARE | Facility: PHYSICIAN GROUP | Age: 54
End: 2019-03-01
Payer: MEDICARE

## 2019-03-01 VITALS
DIASTOLIC BLOOD PRESSURE: 78 MMHG | SYSTOLIC BLOOD PRESSURE: 120 MMHG | BODY MASS INDEX: 17.9 KG/M2 | HEIGHT: 70 IN | TEMPERATURE: 97.8 F | WEIGHT: 125 LBS | OXYGEN SATURATION: 95 % | RESPIRATION RATE: 13 BRPM | HEART RATE: 91 BPM

## 2019-03-01 DIAGNOSIS — S61.511A LACERATION OF SKIN OF RIGHT WRIST, INITIAL ENCOUNTER: ICD-10-CM

## 2019-03-01 PROCEDURE — 12001 RPR S/N/AX/GEN/TRNK 2.5CM/<: CPT | Performed by: FAMILY MEDICINE

## 2019-03-01 ASSESSMENT — PAIN SCALES - GENERAL: PAINLEVEL: 4=SLIGHT-MODERATE PAIN

## 2019-03-01 ASSESSMENT — ENCOUNTER SYMPTOMS
MYALGIAS: 0
BRUISES/BLEEDS EASILY: 0

## 2019-03-01 NOTE — PROGRESS NOTES
"Subjective:      Celso Rose is a 54 y.o. male who presents with Arm Injury (Right wrist LAC from removing a stereo from car this am. )            Onset today full-thickness laceration palmar aspect of right wrist.  Cut on sharp sheet metal of a car stereo.  Bleeding controlled with direct pressure.  No function or sensory loss.  No foreign body.  Tetanus is up-to-date.  Pain is mild to moderate.  No other aggravating or alleviating factors.        Review of Systems   Musculoskeletal: Negative for joint pain and myalgias.   Skin: Negative for itching and rash.   Endo/Heme/Allergies: Does not bruise/bleed easily.          Objective:     /78   Pulse 91   Temp 36.6 °C (97.8 °F)   Resp 13   Ht 1.765 m (5' 9.5\")   Wt 56.7 kg (125 lb)   SpO2 95%   BMI 18.19 kg/m²      Physical Exam   Constitutional: He appears well-developed and well-nourished. No distress.   HENT:   Head: Normocephalic and atraumatic.   Musculoskeletal:        Arms:  Skin: Skin is warm and dry.          Procedure: Laceration Repair  -Risks including bleeding, nerve damage, infection, and poor cosmetic outcome discussed at length. Benefits and alternatives discussed.   -Sterile technique throughout  -Local anesthesia with 2% lidocaine  -Closed with #1  4-0 Nylon horizontal mattress with good wound approximation  -Polysporin and dressing placed  -Patient tolerated well            Assessment/Plan:     1. Laceration of skin of right wrist, initial encounter       Differential diagnosis, natural history, supportive care, and indications for immediate follow-up discussed at length.     Wound care discussed  Suture removal in 10 days  "

## 2019-03-11 ENCOUNTER — OFFICE VISIT (OUTPATIENT)
Dept: URGENT CARE | Facility: PHYSICIAN GROUP | Age: 54
End: 2019-03-11
Payer: MEDICARE

## 2019-03-11 VITALS
HEIGHT: 70 IN | BODY MASS INDEX: 17.9 KG/M2 | HEART RATE: 76 BPM | OXYGEN SATURATION: 97 % | RESPIRATION RATE: 16 BRPM | TEMPERATURE: 99.4 F | DIASTOLIC BLOOD PRESSURE: 70 MMHG | WEIGHT: 125 LBS | SYSTOLIC BLOOD PRESSURE: 120 MMHG

## 2019-03-11 DIAGNOSIS — Z71.6 TOBACCO ABUSE COUNSELING: ICD-10-CM

## 2019-03-11 DIAGNOSIS — Z48.02 ENCOUNTER FOR REMOVAL OF SUTURES: ICD-10-CM

## 2019-03-11 DIAGNOSIS — Z72.0 TOBACCO ABUSE: ICD-10-CM

## 2019-03-11 PROCEDURE — 99406 BEHAV CHNG SMOKING 3-10 MIN: CPT | Mod: 25 | Performed by: PHYSICIAN ASSISTANT

## 2019-03-11 PROCEDURE — 99024 POSTOP FOLLOW-UP VISIT: CPT | Mod: 25 | Performed by: PHYSICIAN ASSISTANT

## 2019-03-11 NOTE — PROCEDURES
Suture Removal  Date/Time: 3/11/2019 8:20 AM  Performed by: KEL RUELAS  Authorized by: KEL RUELAS   Body area: upper extremity  Location details: right wrist  Wound Appearance: clean and pink  Sutures Removed: 1  Facility: sutures placed in this facility  Patient tolerance: Patient tolerated the procedure well with no immediate complications

## 2019-03-11 NOTE — PROGRESS NOTES
Subjective:   Celso Rose is a 54 y.o. male who presents for Suture / Staple Removal (right wrist)        Is Worker's Comp. patient presents for suture removal and wound check.  He states that the wound is healing well.  He does report some decreased sensation in digits 4 and 5 out of the affected hand.  Patient states that he has type 1 diabetes and he smokes daily.  He has tried to quit smoking in the past and was able to quit for 3 months.  However he began smoking again due to increased stress.  Patient states that he feels that he self medicates with tobacco but is aware that he would likely heal faster and regain sensation more quickly if he were to stop smoking.  This is motivation for him to attempt to quit.  He has seen a psychologist in the past for mental health issues and states that this also helped him to quit smoking previously.      Review of Systems   Skin: Negative for itching and rash.       PMH:  has a past medical history of Bipolar disorder (HCC).  MEDS:   Current Outpatient Prescriptions:   •  metFORMIN ER (GLUCOPHAGE XR) 500 MG TABLET SR 24 HR, Take 1 Tab by mouth 2 times a day., Disp: 60 Tab, Rfl: 11  •  atorvastatin (LIPITOR) 10 MG Tab, Take 1 Tab by mouth every day., Disp: 90 Tab, Rfl: 3  •  lisinopril (PRINIVIL) 2.5 MG Tab, Take 1 Tab by mouth every day., Disp: 90 Tab, Rfl: 3  •  insulin glargine (LANTUS SOLOSTAR) 100 UNIT/ML Solution Pen-injector injection, Inject 30 Units as instructed every evening., Disp: 1 PEN, Rfl: 11  •  Blood Glucose Monitoring Suppl Supplies Misc, Test strips order: Test strips for Bernard Contour Next meter. Sig: use 3 times daily and prn ssx high or low sugar, Disp: 100 Strip, Rfl: 6  •  Misc. Devices Misc, lantus nanno pen needles to use QD, Disp: 30 Each, Rfl: 11  ALLERGIES: No Known Allergies  SURGHX:   Past Surgical History:   Procedure Laterality Date   • KNEE ARTHROSCOPY     • SHOULDER ARTHROSCOPY       SOCHX:  reports that he has been smoking  "Cigarettes.  He has a 1.00 pack-year smoking history. He has never used smokeless tobacco. He reports that he uses drugs, including Marijuana, about 7 times per week. He reports that he does not drink alcohol.  FH: Family history was reviewed, no pertinent findings to report   Objective:   /70   Pulse 76   Temp 37.4 °C (99.4 °F) (Temporal)   Resp 16   Ht 1.765 m (5' 9.5\")   Wt 56.7 kg (125 lb)   SpO2 97%   BMI 18.19 kg/m²   Physical Exam   Constitutional: He appears well-developed and well-nourished.  Non-toxic appearance. No distress.   HENT:   Head: Normocephalic and atraumatic.   Right Ear: External ear normal.   Left Ear: External ear normal.   Nose: Nose normal.   Neck: Neck supple.   Cardiovascular:   Pulses:       Radial pulses are 2+ on the left side.   Pulmonary/Chest: Effort normal. No respiratory distress.   Neurological: He is alert. No sensory deficit.    Decreased sensation on the volar aspect of digits 4 and 5.       Skin: Skin is warm and dry. Capillary refill takes less than 2 seconds.   Wound is well approximated and nearly fully healed.  No discharge, no erythema, no edema.   Psychiatric: He has a normal mood and affect. His speech is normal and behavior is normal. Judgment and thought content normal. Cognition and memory are normal.   Vitals reviewed.        Assessment/Plan:   1. Encounter for removal of sutures  - Suture Removal    2. Tobacco abuse counseling    3. Tobacco abuse    Suture removed-no evidence of infection.  Patient advised that sensation should improve over the next weeks to months.  I advised him that if he could stop smoking his nerves would likely heal more quickly.    I informed patient that there are medications available to assist him in quitting tobacco additionally there are local support groups that can help facilitate his effort.  Patient is attended previous psychological counseling-resuming this may also aid his efforts.  Patient's only current motivation " to quit it so that he can heal faster and regain sensation in his affected fingers.  He states that he will consider attempting to quit.  I encouraged him to follow-up with his primary care provider or return to clinic if he would like a medical assistance or further guidance.  I spent 7 minutes counseling the patient.      Differential diagnosis, natural history, supportive care, and indications for immediate follow-up discussed.

## 2019-05-14 RX ORDER — PEN NEEDLE, DIABETIC 32GX 5/32"
NEEDLE, DISPOSABLE MISCELLANEOUS
Qty: 100 EACH | Refills: 11 | Status: SHIPPED | OUTPATIENT
Start: 2019-05-14 | End: 2019-10-16 | Stop reason: SDUPTHER

## 2019-10-15 RX ORDER — INSULIN GLARGINE 100 [IU]/ML
INJECTION, SOLUTION SUBCUTANEOUS
Qty: 3 PEN | Refills: 0 | Status: SHIPPED | OUTPATIENT
Start: 2019-10-15 | End: 2019-10-16 | Stop reason: SDUPTHER

## 2019-10-16 ENCOUNTER — OFFICE VISIT (OUTPATIENT)
Dept: MEDICAL GROUP | Facility: MEDICAL CENTER | Age: 54
End: 2019-10-16
Payer: MEDICARE

## 2019-10-16 VITALS
HEART RATE: 87 BPM | WEIGHT: 125 LBS | HEIGHT: 70 IN | BODY MASS INDEX: 17.9 KG/M2 | SYSTOLIC BLOOD PRESSURE: 128 MMHG | RESPIRATION RATE: 16 BRPM | TEMPERATURE: 97.7 F | OXYGEN SATURATION: 97 % | DIASTOLIC BLOOD PRESSURE: 72 MMHG

## 2019-10-16 DIAGNOSIS — Z91.199 NON-COMPLIANCE: ICD-10-CM

## 2019-10-16 DIAGNOSIS — Z72.0 TOBACCO ABUSE: ICD-10-CM

## 2019-10-16 DIAGNOSIS — G89.29 CHRONIC PAIN OF BOTH KNEES: ICD-10-CM

## 2019-10-16 DIAGNOSIS — M25.561 CHRONIC PAIN OF BOTH KNEES: ICD-10-CM

## 2019-10-16 DIAGNOSIS — H92.01 RIGHT EAR PAIN: ICD-10-CM

## 2019-10-16 DIAGNOSIS — M25.562 CHRONIC PAIN OF BOTH KNEES: ICD-10-CM

## 2019-10-16 DIAGNOSIS — E78.5 DYSLIPIDEMIA: ICD-10-CM

## 2019-10-16 DIAGNOSIS — F31.81 BIPOLAR 2 DISORDER, MAJOR DEPRESSIVE EPISODE (HCC): ICD-10-CM

## 2019-10-16 LAB
HBA1C MFR BLD: 8.3 % (ref 0–5.6)
INT CON NEG: NEGATIVE
INT CON POS: POSITIVE

## 2019-10-16 PROCEDURE — 99214 OFFICE O/P EST MOD 30 MIN: CPT | Performed by: NURSE PRACTITIONER

## 2019-10-16 PROCEDURE — 83036 HEMOGLOBIN GLYCOSYLATED A1C: CPT | Performed by: NURSE PRACTITIONER

## 2019-10-16 RX ORDER — ATORVASTATIN CALCIUM 10 MG/1
10 TABLET, FILM COATED ORAL DAILY
Qty: 90 TAB | Refills: 3 | Status: SHIPPED | OUTPATIENT
Start: 2019-10-16 | End: 2020-12-04

## 2019-10-16 RX ORDER — METFORMIN HYDROCHLORIDE 500 MG/1
1000 TABLET, EXTENDED RELEASE ORAL 2 TIMES DAILY
Qty: 120 TAB | Refills: 3 | Status: SHIPPED | OUTPATIENT
Start: 2019-10-16 | End: 2020-12-04

## 2019-10-16 RX ORDER — LISINOPRIL 2.5 MG/1
2.5 TABLET ORAL DAILY
Qty: 90 TAB | Refills: 3 | Status: SHIPPED | OUTPATIENT
Start: 2019-10-16 | End: 2020-12-04

## 2019-10-16 RX ORDER — FLUTICASONE PROPIONATE 50 MCG
2 SPRAY, SUSPENSION (ML) NASAL DAILY
Qty: 16 G | Refills: 11 | Status: SHIPPED | OUTPATIENT
Start: 2019-10-16 | End: 2020-06-22

## 2019-10-16 NOTE — PROGRESS NOTES
Subjective:      Celso Rose is a 54 y.o. male who presents with Follow-Up (ear pain)        CC: Patient is here today for right ear pain but also needs to be followed for his diabetes, dyslipidemia and bipolar disorder.        1. Uncontrolled type 2 diabetes mellitus without complication, with long-term current use of insulin (HCC)  I have not seen patient for his diabetes since January, 10 months ago.  At his previous visits, he had been very reluctant to go on any medications feeling what ever would happen would happen.  He did finally agreed to go on twice a day metformin 500 mg and Lantus insulin at 30 units.  He states he stopped all of his medicines except for his Lantus at 30 units a day and subsequently his hemoglobin A1c is 8.3.  His hemoglobin A1c had been as high as 14 before establishing here.    2. Non-compliance  Patient continues to stop his meds soon after starting them and has not been coming in regularly for blood work or evaluation.    3. Dyslipidemia  Patient is off his statin and it is unclear how many months he actually took it for.    4. Right ear pain  Patient is complaining of right ear pain on and off for the last week.  He states it is a fullness mostly and there is been no discharge and he does have some sinus congestion.    5. Chronic pain of both knees  Patient wears a brace on his right knee and states he has a history of chronic knee problems and is due to see orthopedics to discuss options.  He also brings with him today DMV paperwork.  He states besides using his brace all the time, he sometimes uses a cane.    6. Bipolar 2 disorder, major depressive episode (HCC)  Patient has declined in the past going to psychiatry or staying on medications for this.    7. Tobacco abuse  Patient continues to smoke cigarettes despite his risk with diabetes.  HPI   Past Medical History:   Diagnosis Date   • Bipolar disorder (HCC)      Social History     Socioeconomic History   • Marital  status: Single     Spouse name: Not on file   • Number of children: Not on file   • Years of education: Not on file   • Highest education level: Not on file   Occupational History   • Not on file   Social Needs   • Financial resource strain: Not on file   • Food insecurity:     Worry: Not on file     Inability: Not on file   • Transportation needs:     Medical: Not on file     Non-medical: Not on file   Tobacco Use   • Smoking status: Current Every Day Smoker     Packs/day: 1.00     Years: 1.00     Pack years: 1.00     Types: Cigarettes   • Smokeless tobacco: Never Used   • Tobacco comment: started smoking at age 14   Substance and Sexual Activity   • Alcohol use: No   • Drug use: Yes     Frequency: 7.0 times per week     Types: Marijuana   • Sexual activity: Yes     Partners: Female     Birth control/protection: Condom   Lifestyle   • Physical activity:     Days per week: Not on file     Minutes per session: Not on file   • Stress: Not on file   Relationships   • Social connections:     Talks on phone: Not on file     Gets together: Not on file     Attends [employer] service: Not on file     Active member of club or organization: Not on file     Attends meetings of clubs or organizations: Not on file     Relationship status: Not on file   • Intimate partner violence:     Fear of current or ex partner: Not on file     Emotionally abused: Not on file     Physically abused: Not on file     Forced sexual activity: Not on file   Other Topics Concern   • Not on file   Social History Narrative   • Not on file     Current Outpatient Medications   Medication Sig Dispense Refill   • metFORMIN ER (GLUCOPHAGE XR) 500 MG TABLET SR 24 HR Take 2 Tabs by mouth 2 times a day. 120 Tab 3   • lisinopril (PRINIVIL) 2.5 MG Tab Take 1 Tab by mouth every day. 90 Tab 3   • atorvastatin (LIPITOR) 10 MG Tab Take 1 Tab by mouth every day. 90 Tab 3   • insulin glargine (LANTUS SOLOSTAR) 100 UNIT/ML Solution Pen-injector injection INJECT 30  "UNITS SUBCUTANEOUSLY ONCE DAILY IN THE EVENING 8 PEN 3   • Insulin Pen Needle 32 G x 4 mm (BD PEN NEEDLE ADA U/F) USE 1 PEN NEEDLE WITH LANTUS ONCE A  Each 11   • fluticasone (FLONASE) 50 MCG/ACT nasal spray Spray 2 Sprays in nose every day. 16 g 11   • Blood Glucose Monitoring Suppl Supplies Misc Test strips order: Test strips for Bernard Contour Next meter. Sig: use 3 times daily and prn ssx high or low sugar 100 Strip 6     No current facility-administered medications for this visit.      Family History   Problem Relation Age of Onset   • Heart Disease Mother    • Diabetes Father    • Hypertension Sister    • Alcohol/Drug Sister          Review of Systems   HENT: Positive for ear pain.    Musculoskeletal: Positive for joint pain.   All other systems reviewed and are negative.         Objective:     /72 (BP Location: Left arm, Patient Position: Sitting, BP Cuff Size: Adult)   Pulse 87   Temp 36.5 °C (97.7 °F) (Temporal)   Resp 16   Ht 1.765 m (5' 9.5\")   Wt 56.7 kg (125 lb)   SpO2 97%   BMI 18.19 kg/m²      Physical Exam   Constitutional: He is oriented to person, place, and time. He appears well-developed and well-nourished. No distress.   HENT:   Head: Normocephalic and atraumatic.   Right Ear: External ear normal.   Left Ear: External ear normal.   Nose: Nose normal.   Mouth/Throat: Oropharynx is clear and moist.   No evidence of otitis media or perforation of the right tympanic membrane.  Clear effusion present.   Eyes: Conjunctivae are normal. Right eye exhibits no discharge. Left eye exhibits no discharge.   Neck: Normal range of motion. Neck supple. No tracheal deviation present. No thyromegaly present.   Cardiovascular: Normal rate, regular rhythm and normal heart sounds.   No murmur heard.  Pulmonary/Chest: Effort normal and breath sounds normal. No respiratory distress. He has no wheezes. He has no rales.   Musculoskeletal:   Patient wearing a brace on his right knee and reports " "decreased mobility and pain with movement.   Lymphadenopathy:     He has no cervical adenopathy.   Neurological: He is alert and oriented to person, place, and time. Coordination normal.   Skin: Skin is warm and dry. No rash noted. He is not diaphoretic. No erythema.   Psychiatric: He has a normal mood and affect. His behavior is normal. Judgment and thought content normal.   Nursing note and vitals reviewed.              Assessment/Plan:     1. Uncontrolled type 2 diabetes mellitus without complication, with long-term current use of insulin (HCC)  I will try to get patient on high-dose metformin 1000 mg twice a day but from previous experience with him, he may not do so.  He again states \"I am not a pill taker\" and therefore often refuses to take medicines or stops them.  I will get him back on his statin and ACE inhibitor.  I will have him continue with his Lantus insulin with a goal of a blood sugar between 80 and 140.  - POCT  A1C  - metFORMIN ER (GLUCOPHAGE XR) 500 MG TABLET SR 24 HR; Take 2 Tabs by mouth 2 times a day.  Dispense: 120 Tab; Refill: 3  - lisinopril (PRINIVIL) 2.5 MG Tab; Take 1 Tab by mouth every day.  Dispense: 90 Tab; Refill: 3  - Comp Metabolic Panel; Future  - HEMOGLOBIN A1C; Future  - MICROALBUMIN CREAT RATIO URINE; Future  - insulin glargine (LANTUS SOLOSTAR) 100 UNIT/ML Solution Pen-injector injection; INJECT 30 UNITS SUBCUTANEOUSLY ONCE DAILY IN THE EVENING  Dispense: 8 PEN; Refill: 3  - Insulin Pen Needle 32 G x 4 mm (BD PEN NEEDLE ADA U/F); USE 1 PEN NEEDLE WITH LANTUS ONCE A DAY  Dispense: 100 Each; Refill: 11    2. Non-compliance  Patient continues to be noncompliant with not coming in for regular checkups or taking his medicine as prescribed.  I again reviewed with him the risks of not taking medicines    3. Dyslipidemia  Patient advised to go back on his statin.  - atorvastatin (LIPITOR) 10 MG Tab; Take 1 Tab by mouth every day.  Dispense: 90 Tab; Refill: 3  - Lipid Profile; " Future    4. Right ear pain  I do not see evidence of otitis media and it is allergy season currently so I will have him try Flonase nasal spray  - fluticasone (FLONASE) 50 MCG/ACT nasal spray; Spray 2 Sprays in nose every day.  Dispense: 16 g; Refill: 11    5. Chronic pain of both knees  Patient would like to see orthopedics for his chronic bilateral knee issue and states he is due for a follow-up at the [facility].  DMV placard paperwork filled out.  - REFERRAL TO ORTHOPEDICS    6. Bipolar 2 disorder, major depressive episode (HCC)  Patient does not like to take medications and therefore is not treating.    7. Tobacco abuse  Patient again advised to quit smoking.

## 2020-01-08 ENCOUNTER — PATIENT OUTREACH (OUTPATIENT)
Dept: HEALTH INFORMATION MANAGEMENT | Facility: OTHER | Age: 55
End: 2020-01-08

## 2020-01-08 NOTE — PROGRESS NOTES
Outcome: Left Message    Please transfer to Patient Outreach Team at [phone] when patient returns call.    [facility] Verified: yes    Attempt # 1

## 2020-06-03 NOTE — PROGRESS NOTES
Outcome: Left Message    Please transfer to Patient Outreach Team at [phone] when patient returns call.    [facility] Verified: yes    Attempt # 3

## 2020-06-16 NOTE — PROGRESS NOTES
1. [facility] Verified: yes    2. Verify PCP: yes    3. Review and add  to Care Team: yes    4. Reviewed/Updated the following with patient:       •   Communication Preference Obtained? YES  • MyChart Activation: declined       •   E-Mail Address Obtained? NO       •   Appointment Day and Time Preferences? YES       •   Preferred Pharmacy? YES       •   Preferred Lab? YES    6. Care Gap Scheduling (Attempt to Schedule EACH Overdue Care Gap!)    Scheduled patient for Annual Wellness Visit / AHA

## 2020-06-22 ENCOUNTER — OFFICE VISIT (OUTPATIENT)
Dept: MEDICAL GROUP | Facility: MEDICAL CENTER | Age: 55
End: 2020-06-22
Payer: MEDICARE

## 2020-06-22 VITALS
SYSTOLIC BLOOD PRESSURE: 102 MMHG | HEART RATE: 90 BPM | BODY MASS INDEX: 18.22 KG/M2 | HEIGHT: 69 IN | DIASTOLIC BLOOD PRESSURE: 64 MMHG | OXYGEN SATURATION: 95 % | WEIGHT: 123 LBS

## 2020-06-22 DIAGNOSIS — F31.81 BIPOLAR 2 DISORDER, MAJOR DEPRESSIVE EPISODE (HCC): ICD-10-CM

## 2020-06-22 DIAGNOSIS — Z00.00 MEDICARE ANNUAL WELLNESS VISIT, SUBSEQUENT: ICD-10-CM

## 2020-06-22 DIAGNOSIS — Z79.4 TYPE 2 DIABETES MELLITUS WITH HYPERGLYCEMIA, WITH LONG-TERM CURRENT USE OF INSULIN (HCC): ICD-10-CM

## 2020-06-22 DIAGNOSIS — Z72.0 TOBACCO ABUSE: ICD-10-CM

## 2020-06-22 DIAGNOSIS — F60.6 AVOIDANT PERSONALITY DISORDER IN ADULT (HCC): ICD-10-CM

## 2020-06-22 DIAGNOSIS — F41.1 GENERALIZED ANXIETY DISORDER: ICD-10-CM

## 2020-06-22 DIAGNOSIS — F12.20 CANNABIS DEPENDENCE, CONTINUOUS (HCC): ICD-10-CM

## 2020-06-22 DIAGNOSIS — Z91.199 NON-COMPLIANCE: ICD-10-CM

## 2020-06-22 DIAGNOSIS — E11.65 TYPE 2 DIABETES MELLITUS WITH HYPERGLYCEMIA, WITH LONG-TERM CURRENT USE OF INSULIN (HCC): ICD-10-CM

## 2020-06-22 PROCEDURE — G0439 PPPS, SUBSEQ VISIT: HCPCS | Performed by: NURSE PRACTITIONER

## 2020-06-22 ASSESSMENT — ACTIVITIES OF DAILY LIVING (ADL): BATHING_REQUIRES_ASSISTANCE: 0

## 2020-06-22 ASSESSMENT — ENCOUNTER SYMPTOMS: GENERAL WELL-BEING: GOOD

## 2020-06-22 ASSESSMENT — PATIENT HEALTH QUESTIONNAIRE - PHQ9: CLINICAL INTERPRETATION OF PHQ2 SCORE: 0

## 2020-06-22 NOTE — PROGRESS NOTES
Chief Complaint   Patient presents with   • Annual Wellness Visit         HPI:  Celso is a 55 y.o. here for Medicare Annual Wellness Visit    1. Medicare annual wellness visit, subsequent  Screening performed below.    2. Non-compliance  Review of patient's chart shows that patient has been noncompliant with visits, medications, and lab work for a number of years.  On his last visit in October 2019 he had been not using his medications except for his Lantus insulin and although he was supposed to be back on his medicines, he states he stopped his metformin, ACE inhibitor, and statin because he just did not want to take them.  He has not been checking his blood sugars and did not do the lab work ordered at his last visit.    3. Bipolar 2 disorder, major depressive episode (HCC)  Patient at one time was going to psychology and psychiatry for his diagnoses of dysthymic disorder avoidant personality disorder and bipolar disorder but admits he no longer follows through and it does not take any medicines.    4. Type 2 diabetes mellitus with hyperglycemia, with long-term current use of insulin ([facility])  Patient's hemoglobin A1c had been as high as 14.3 in 2018 and has never been below 7.0.  Patient states he does exercise and eats well but he has not been taking his medicine regularly.  He has been placed on various medicines such as metformin, Glumetza, and short acting insulins but he has stopped all of them.  He tells us today that he stopped his metformin, lisinopril, and Lipitor because he feels that whenever will happen, will happen and he does not like to be told to take medicines.  He is only taking currently 15 units of Lantus twice a day.  He is wanting a Dexcom device for monitoring his blood sugar and has not been using his glucometer.    5. Tobacco abuse  Patient also smokes cigarettes despite his high risk status.    6. 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    7. Cannabis dependence, continuous ([facility])  Patient states he has decreased his cannabis usage but still does smoke every week.    8. ALLI (generalized anxiety disorder)  Patient was going to counseling up until recently and takes no psychiatric medicines    Patient Active Problem List    Diagnosis Date Noted   • Non-compliance 10/16/2019   • Cannabis dependence, continuous ([facility]) 01/30/2019   • Avoidant personality disorder in adult ([facility]) 08/29/2018   • Dysthymic disorder 08/29/2018   • ALLI (generalized anxiety disorder) 08/29/2018   • Bipolar 2 disorder, major depressive episode ([facility]) 07/19/2018   • Tobacco abuse 07/18/2018   • Uncontrolled type 2 diabetes mellitus without complication, with long-term current use of insulin 01/24/2018       Current Outpatient Medications   Medication Sig Dispense Refill   • insulin glargine (LANTUS SOLOSTAR) 100 UNIT/ML Solution Pen-injector injection INJECT 30 UNITS SUBCUTANEOUSLY ONCE DAILY IN THE EVENING 8 PEN 3   • Insulin Pen Needle 32 G x 4 mm (BD PEN NEEDLE ADA U/F) USE 1 PEN NEEDLE WITH LANTUS ONCE A  Each 11   • Blood Glucose Monitoring Suppl Supplies Misc Test strips order: Test strips for [employer] Contour Next meter. Sig: use 3 times daily and prn ssx high or low sugar 100 Strip 6   • metFORMIN ER (GLUCOPHAGE XR) 500 MG TABLET SR 24 HR Take 2 Tabs by mouth 2 times a day. (Patient not taking: Reported on 6/22/2020) 120 Tab 3   • lisinopril (PRINIVIL) 2.5 MG Tab Take 1 Tab by mouth every day. (Patient not taking: Reported on 6/22/2020) 90 Tab 3   • atorvastatin (LIPITOR) 10 MG Tab Take 1 Tab by mouth every day. (Patient not taking: Reported on 6/22/2020) 90 Tab 3   • fluticasone (FLONASE) 50 MCG/ACT nasal spray Spray 2 Sprays in nose every day. (Patient not taking: Reported on 6/22/2020) 16 g 11     No current facility-administered medications for this visit.         Patient is taking medications as noted in medication list.  Current  supplements as per medication list.     Allergies: Patient has no known allergies.    Current social contact/activities:     Is patient current with immunizations? Yes.    He  reports that he has been smoking cigarettes. He has a 1.00 pack-year smoking history. He has never used smokeless tobacco. He reports current drug use. Frequency: 7.00 times per week. Drug: Marijuana. He reports that he does not drink alcohol.  Ready to quit: Not Answered  Counseling given: Not Answered  Comment: started smoking at age 14        DPA/Advanced directive: Patient does not have an Advanced Directive.  A packet and workshop information was given on Advanced Directives.    ROS:    Gait: Uses no assistive device no  Ostomy: no  Other tubes: No   Amputations: No   Chronic oxygen use  none  last eye exam 2019  Wears hearing aids: No   : Denies any urinary leakage during the last 6 months      Screening:        Depression Screening    Little interest or pleasure in doing things?  0 - not at all  Feeling down, depressed, or hopeless? 0 - not at all  Patient Health Questionnaire Score: 0    If depressive symptoms identified deferred to follow up visit unless specifically addressed in assessment and plan.    Interpretation of PHQ-9 Total Score   Score Severity   1-4 No Depression   5-9 Mild Depression   10-14 Moderate Depression   15-19 Moderately Severe Depression   20-27 Severe Depression    Screening for Cognitive Impairment    Three Minute Recall (village, kitchen, baby)  3/3    Popeye clock face with all 12 numbers and set the hands to show 10 past 10.  Yes    If cognitive concerns identified, deferred for follow up unless specifically addressed in assessment and plan.    Fall Risk Assessment    Has the patient had two or more falls in the last year or any fall with injury in the last year?  No  If fall risk identified, deferred for follow up unless specifically addressed in assessment and plan.    Safety Assessment    Throw rugs on  floor.  No  Handrails on all stairs.  Yes  Good lighting in all hallways.  Yes  Difficulty hearing.  No  Patient counseled about all safety risks that were identified.    Functional Assessment ADLs    Are there any barriers preventing you from cooking for yourself or meeting nutritional needs?  No.    Are there any barriers preventing you from driving safely or obtaining transportation?  No.    Are there any barriers preventing you from using a telephone or calling for help?  No.    Are there any barriers preventing you from shopping?  No.    Are there any barriers preventing you from taking care of your own finances?  No.    Are there any barriers preventing you from managing your medications?  No.    Are there any barriers preventing you from showering, bathing or dressing yourself?  No.    Are you currently engaging in any exercise or physical activity?  Yes.     What is your perception of your health?  Good.    Health Maintenance Summary                HEPATITIS C SCREENING Overdue 1965     URINE ACR / MICROALBUMIN Overdue 1/25/2019      Done 1/25/2018 MICROALBUMIN CREAT RATIO URINE    RETINAL SCREENING Overdue 2/7/2019      Done 2/7/2018 POCT RETINAL EYE EXAM    DIABETES MONOFILAMENT / LE EXAM Overdue 2/7/2019      Done 2/7/2018 [employer]: FINDINGS - TESTS - NEUROLOGY - MONOFILAMENT TEST - RIGHT FOOT - NUMBER OF SITES TESTED     Patient has more history with this topic...    Annual Wellness Visit Overdue 7/19/2019      Done 7/18/2018 Visit Dx: Medicare annual wellness visit, subsequent    FASTING LIPID PROFILE Overdue 1/25/2020      Done 1/25/2019 LIPID PROFILE     Patient has more history with this topic...    SERUM CREATININE Overdue 1/25/2020      Done 1/25/2019 COMP METABOLIC PANEL     Patient has more history with this topic...    A1C SCREENING Overdue 4/16/2020      Done 10/16/2019 POCT A1C     Patient has more history with this topic...    IMM HEP B VACCINE Postponed 7/10/2020 Originally 1/21/1983.  "Patient Refused    IMM DTaP/Tdap/Td Vaccine Postponed 7/13/2022 Originally 1/21/1983. Patient Refused     Done 2/1/2001 Imm Admin: TD Vaccine    IMM ZOSTER VACCINES Postponed 10/11/2022 Originally 1/21/2014. Insurance/Financial    IMM INFLUENZA Next Due 9/1/2020      Done 10/22/2018 Imm Admin: Influenza Vaccine Quad Inj (Pf)     Patient has more history with this topic...          Patient Care Team:  RAFA Koch as PCP - General (Family Medicine)  Martín Goldsmith III, Ed.D. as Consulting Physician (Psychology)  Carmen Barrera as      Social History     Tobacco Use   • Smoking status: Current Every Day Smoker     Packs/day: 1.00     Years: 1.00     Pack years: 1.00     Types: Cigarettes   • Smokeless tobacco: Never Used   • Tobacco comment: started smoking at age 14   Substance Use Topics   • Alcohol use: No   • Drug use: Yes     Frequency: 7.0 times per week     Types: Marijuana     Family History   Problem Relation Age of Onset   • Heart Disease Mother    • Diabetes Father    • Hypertension Sister    • Alcohol/Drug Sister      He  has a past medical history of Bipolar disorder (HCC).   Past Surgical History:   Procedure Laterality Date   • KNEE ARTHROSCOPY     • SHOULDER ARTHROSCOPY             Exam:     /64 (BP Location: Left arm, Patient Position: Sitting, BP Cuff Size: Adult)   Pulse 90   Ht 1.753 m (5' 9\")   Wt 55.8 kg (123 lb)   SpO2 95%  Body mass index is 18.16 kg/m².    Hearing good.    Dentition good  Alert, oriented in no acute distress.  Eye contact is good, speech goal directed, affect calm      Assessment and Plan. The following treatment and monitoring plan is recommended:          1. Medicare annual wellness visit, subsequent  Annual wellness topics discussed, review of chronic medical problems completed    - Subsequent Annual Wellness Visit - Includes PPPS ()    2. Non-compliance  As with each previous visit, I have spoken with patient about my " concerns that he is not taking his medicines or following up on lab work.  I explained about his risk for death and injury by not treating his underlying diabetes and cholesterol.    3. Bipolar 2 disorder, major depressive episode ([facility])  Patient does not wish to go back to see a counselor at this point were going to new medications for his mental health issues.    4. Type 2 diabetes mellitus with hyperglycemia, with long-term current use of insulin ([facility])  Patient's hemoglobin A1c is not surprisingly elevated at 9.4 since all the medicine he is using his Lantus insulin 15 units twice a day.  He stopped his metformin and grudgingly stated he may go back to 1 twice a day again.  He does have a glucometer but wants a daily monitoring device which I explained we do not order through this office but I would be willing to get him to endocrinology to discuss if he is a candidate for 1.  I explained I would like to see his hemoglobin A1c below 7 and that he should start back on his statin, aspirin and ACE inhibitor but it does not look like he will do so.  I recommended to get his lab work done which I ordered in October of last year and the need for having this done yearly.  - POCT  A1C  - REFERRAL TO ENDOCRINOLOGY    5. Tobacco abuse  Patient advised to quit smoking    6. Avoidant personality disorder in adult ([facility])  Patient advised to get back with counseling    7. Cannabis dependence, continuous ([facility])  Patient reminded to decrease his cannabis usage    8. ALLI (generalized anxiety disorder)  Patient should get back to counseling    Services suggested: No services needed at this time  Health Care Screening recommendations as per orders if indicated.  Referrals offered: PT/OT/Nutrition counseling/Behavioral Health/Smoking cessation as per orders if indicated.    Discussion today about general wellness and lifestyle habits:    · Prevent falls and reduce trip hazards; Cautioned about securing or removing rugs.  · Have a  working fire alarm and carbon monoxide detector;   · Engage in regular physical activity and social activities       Follow-up: No follow-ups on file.

## 2020-07-27 ENCOUNTER — NURSE TRIAGE (OUTPATIENT)
Dept: HEALTH INFORMATION MANAGEMENT | Facility: OTHER | Age: 55
End: 2020-07-27

## 2020-07-27 NOTE — TELEPHONE ENCOUNTER
Wants to establish with Breann Jesus, appt would be for today.     1. Caller Name: Celso Rose               Call Back Number: [phone] (home)   [facility] PCP or Specialty Provider: Yes Dr. Brennan, but re-establishing with Breann Jesus        2.  In the last two weeks, has the patient had any new or worsening symptoms (not explained by alternative diagnosis)? Yes, the patient reports the following COVID-19 consistent symptoms: sore throat, muscle pain or body aches and headache.    3.  Does patient have any comoribidities? DM    4.  Has the patient traveled in the last 14 days OR had any known contact with someone who is suspected or confirmed to have COVID-19?  No.    5. Disposition: Offered patient virtual visit to limit potential exposure to others; patient also given self care instructions   Patient says he has not applicable technology for virtual visit. Will re-schedule his appt to establish 2 weeks out with re-screening needed prior to appt.     Patient notified that he can visit an UC to be seen in person if symptoms worsen.     Note routed to [facility] Provider: GEORGE only.

## 2020-07-27 NOTE — TELEPHONE ENCOUNTER
----- Message from Annemarie Enciso sent at 7/27/2020  8:47 AM PDT -----  PT HAS APPOINTMENT TODAY AND STATING HE HAS A SLIGHT HEADACHE

## 2020-12-04 ENCOUNTER — OFFICE VISIT (OUTPATIENT)
Dept: MEDICAL GROUP | Facility: PHYSICIAN GROUP | Age: 55
End: 2020-12-04
Payer: MEDICARE

## 2020-12-04 VITALS
HEIGHT: 69 IN | DIASTOLIC BLOOD PRESSURE: 60 MMHG | WEIGHT: 129 LBS | SYSTOLIC BLOOD PRESSURE: 116 MMHG | OXYGEN SATURATION: 97 % | TEMPERATURE: 98.4 F | BODY MASS INDEX: 19.11 KG/M2 | HEART RATE: 94 BPM | RESPIRATION RATE: 14 BRPM

## 2020-12-04 DIAGNOSIS — E11.9 TYPE 2 DIABETES MELLITUS WITHOUT COMPLICATION, WITH LONG-TERM CURRENT USE OF INSULIN (HCC): ICD-10-CM

## 2020-12-04 DIAGNOSIS — Z11.59 NEED FOR HEPATITIS C SCREENING TEST: ICD-10-CM

## 2020-12-04 DIAGNOSIS — Z12.5 SCREENING FOR MALIGNANT NEOPLASM OF PROSTATE: ICD-10-CM

## 2020-12-04 DIAGNOSIS — H92.03 OTALGIA OF BOTH EARS: ICD-10-CM

## 2020-12-04 DIAGNOSIS — F31.81 BIPOLAR 2 DISORDER, MAJOR DEPRESSIVE EPISODE (HCC): Chronic | ICD-10-CM

## 2020-12-04 DIAGNOSIS — Z72.0 TOBACCO ABUSE: Chronic | ICD-10-CM

## 2020-12-04 DIAGNOSIS — F12.20 CANNABIS DEPENDENCE, CONTINUOUS (HCC): ICD-10-CM

## 2020-12-04 DIAGNOSIS — Z79.4 TYPE 2 DIABETES MELLITUS WITHOUT COMPLICATION, WITH LONG-TERM CURRENT USE OF INSULIN (HCC): ICD-10-CM

## 2020-12-04 DIAGNOSIS — K58.9 IRRITABLE BOWEL SYNDROME, UNSPECIFIED TYPE: ICD-10-CM

## 2020-12-04 PROCEDURE — 99215 OFFICE O/P EST HI 40 MIN: CPT | Performed by: INTERNAL MEDICINE

## 2020-12-04 NOTE — PROGRESS NOTES
CC: Bilateral ear pain  Requests referral to see GI specialist for IBS      HPI: This is a 55 y.o. pt.  PCP is Louis LAROSE    Pt's medical history is notable for:     Tobacco abuse  This has been a chronic condition.  Strong advised the patient to quit smoking.  The patient is not ready.  He is not interested in tobacco cessation program nor the use of nicotine replacement.    Bipolar 2 disorder, major depressive episode (HCC)  This is a chronic condition.  The patient requests referral to psychiatry.  Patient denies suicidal ideation.    IBS (irritable bowel syndrome)  This has been a chronic condition noted for several years.  Patient is requesting referral to see GI specialist.  He also reported history of colonic polyp and the patient is also due for colonoscopy.    Type 2 diabetes mellitus (HCC)  This is a chronic condition patient is currently on Lantus 30 units at bedtime.  Patient does not check his sugar at home on regular basis.  Patient is due for lab test.  He denies significant side effects or problems using insulin treatment    Cannabis dependence, continuous (HCC)  Patient reported using marijuana on regular basis.  Strongly advised the patient to discontinue.    Otalgia of both ears  This is a chronic condition noticed since last several years.  He denies any recent trauma injury no fever chills   denies tinnitus.          REVIEW OF SYSTEMS:     Constitutional:  no fever / chills   Neurologic: no headaches, no numbness/tingling  Eyes: no changes in vision  ENT: no sore throat, no hearing loss  CV:  no chest pain, no palpitations  Pulmonary: no SOB, no cough          Allergies: Patient has no known allergies.    Current Outpatient Medications Ordered in Epic   Medication Sig Dispense Refill   • Insulin Pen Needle 32 G x 4 mm (BD PEN NEEDLE ADA U/F) USE 1 PEN NEEDLE WITH LANTUS ONCE DAILY *E11.65* 100 Each 11   • insulin glargine (LANTUS SOLOSTAR) 100 UNIT/ML Solution Pen-injector injection  INJECT 30 UNITS SUBCUTANEOUSLY ONCE DAILY IN THE EVENING 8 PEN 3   • Blood Glucose Monitoring Suppl Supplies Misc Test strips order: Test strips for [employer] Contour Next meter. Sig: use 3 times daily and prn ssx high or low sugar 100 Strip 6     No current Epic-ordered facility-administered medications on file.        Past Medical History:   Diagnosis Date   • Bipolar disorder (HCC)         Past Surgical History:   Procedure Laterality Date   • KNEE ARTHROSCOPY     • SHOULDER ARTHROSCOPY          Family History   Problem Relation Age of Onset   • Heart Disease Mother    • Diabetes Father    • Hypertension Sister    • Alcohol/Drug Sister         Social History     Tobacco Use   Smoking Status Current Every Day Smoker   • Packs/day: 1.00   • Years: 1.00   • Pack years: 1.00   • Types: Cigarettes   Smokeless Tobacco Never Used   Tobacco Comment    started smoking at age 14          Social History     Substance and Sexual Activity   Alcohol Use No         ------------------------------------------------------------------------------     PHYSICAL EXAM:   Vitals:    12/04/20 0943   BP: 116/60   Pulse: 94   Resp: 14   Temp: 36.9 °C (98.4 °F)   SpO2: 97%      Body mass index is 19.05 kg/m².         Constitutional: no acute distress  Neck: supple, no JVD  CV: heart RRR  Resp: normal effort, no wheezing or rales.  GI: abdomen soft, no obvious mass, no tenderness  Neuro: CN 2-12 grossly intact  Ears external canals are clear tympanic intact no acute abnormality noted bilaterally.      -----------------------------------------------------------------------------    ASSESSMENT:     Irritable bowel syndrome  Diabetes mellitus  Bilateral otalgia  Nicotine dependence  Cannabis dependence  Bipolar disorder        MEDICAL DECISION MAKING: TODAY'S DISCUSSION / STATUS / PLAN:    For the IBS condition refer the patient to GI specialist as requested.    Diabetes mellitus.  Current control is unclear.  Lab tests ordered.  Advised the  patient to follow-up with PCP after lab test done  A1c goal of 7% discussed w pt.  Advised lifestyle modification.  Stressed the importance of low sweet /low carb diet, high lean protein, and avoid unhealthy foods.  Encourage pt to start/continue with regular exercises/walking.       Bilateral otalgia.  Examination today is unremarkable.  Cause is unclear.  Refer the patient to ENT for further evaluation.    bipolar condition: refer the patient to psychiatry service.      PATIENT EDUCATION:  -If any problems should arise, patient was advised to contact our office or go to ER to be evaluated.      Please note that this dictation was created using voice recognition software. I have made every reasonable attempt to correct obvious errors, but it is possible there are errors of grammar and possibly content that I did not discover before finalizing the note.

## 2020-12-04 NOTE — ASSESSMENT & PLAN NOTE
This has been a chronic condition.  Madan advised the patient to quit smoking.  The patient is not ready.  He is not interested in tobacco cessation program nor the use of nicotine replacement.

## 2020-12-04 NOTE — ASSESSMENT & PLAN NOTE
This is a chronic condition.  The patient requests referral to psychiatry.  Patient denies suicidal ideation.

## 2020-12-04 NOTE — ASSESSMENT & PLAN NOTE
This is a chronic condition noticed since last several years.  He denies any recent trauma injury no fever chills   denies tinnitus.

## 2020-12-04 NOTE — ASSESSMENT & PLAN NOTE
This has been a chronic condition noted for several years.  Patient is requesting referral to see GI specialist.  He also reported history of colonic polyp and the patient is also due for colonoscopy.

## 2020-12-04 NOTE — ASSESSMENT & PLAN NOTE
This is a chronic condition patient is currently on Lantus 30 units at bedtime.  Patient does not check his sugar at home on regular basis.  Patient is due for lab test.  He denies significant side effects or problems using insulin treatment

## 2020-12-08 ENCOUNTER — HOSPITAL ENCOUNTER (OUTPATIENT)
Dept: LAB | Facility: MEDICAL CENTER | Age: 55
End: 2020-12-08
Attending: INTERNAL MEDICINE
Payer: MEDICARE

## 2020-12-08 ENCOUNTER — TELEPHONE (OUTPATIENT)
Dept: MEDICAL GROUP | Facility: PHYSICIAN GROUP | Age: 55
End: 2020-12-08

## 2020-12-08 DIAGNOSIS — E11.9 TYPE 2 DIABETES MELLITUS WITHOUT COMPLICATION, WITH LONG-TERM CURRENT USE OF INSULIN (HCC): ICD-10-CM

## 2020-12-08 DIAGNOSIS — Z79.4 TYPE 2 DIABETES MELLITUS WITHOUT COMPLICATION, WITH LONG-TERM CURRENT USE OF INSULIN (HCC): ICD-10-CM

## 2020-12-08 LAB
ALT SERPL-CCNC: 10 U/L (ref 2–50)
ANION GAP SERPL CALC-SCNC: 8 MMOL/L (ref 7–16)
BASOPHILS # BLD AUTO: 0.7 % (ref 0–1.8)
BASOPHILS # BLD: 0.07 K/UL (ref 0–0.12)
BUN SERPL-MCNC: 8 MG/DL (ref 8–22)
CALCIUM SERPL-MCNC: 9.5 MG/DL (ref 8.5–10.5)
CHLORIDE SERPL-SCNC: 110 MMOL/L (ref 96–112)
CHOLEST SERPL-MCNC: 165 MG/DL (ref 100–199)
CO2 SERPL-SCNC: 21 MMOL/L (ref 20–33)
CREAT SERPL-MCNC: 0.74 MG/DL (ref 0.5–1.4)
CREAT UR-MCNC: 211.88 MG/DL
EOSINOPHIL # BLD AUTO: 0.67 K/UL (ref 0–0.51)
EOSINOPHIL NFR BLD: 6.5 % (ref 0–6.9)
ERYTHROCYTE [DISTWIDTH] IN BLOOD BY AUTOMATED COUNT: 46.5 FL (ref 35.9–50)
EST. AVERAGE GLUCOSE BLD GHB EST-MCNC: 206 MG/DL
FASTING STATUS PATIENT QL REPORTED: NORMAL
GLUCOSE SERPL-MCNC: 77 MG/DL (ref 65–99)
HBA1C MFR BLD: 8.8 % (ref 0–5.6)
HCT VFR BLD AUTO: 47 % (ref 42–52)
HCV AB SER QL: NORMAL
HDLC SERPL-MCNC: 39 MG/DL
HGB BLD-MCNC: 16.1 G/DL (ref 14–18)
IMM GRANULOCYTES # BLD AUTO: 0.02 K/UL (ref 0–0.11)
IMM GRANULOCYTES NFR BLD AUTO: 0.2 % (ref 0–0.9)
LDLC SERPL CALC-MCNC: 110 MG/DL
LYMPHOCYTES # BLD AUTO: 3.79 K/UL (ref 1–4.8)
LYMPHOCYTES NFR BLD: 37 % (ref 22–41)
MCH RBC QN AUTO: 34 PG (ref 27–33)
MCHC RBC AUTO-ENTMCNC: 34.3 G/DL (ref 33.7–35.3)
MCV RBC AUTO: 99.2 FL (ref 81.4–97.8)
MICROALBUMIN UR-MCNC: 1.6 MG/DL
MICROALBUMIN/CREAT UR: 8 MG/G (ref 0–30)
MONOCYTES # BLD AUTO: 0.5 K/UL (ref 0–0.85)
MONOCYTES NFR BLD AUTO: 4.9 % (ref 0–13.4)
NEUTROPHILS # BLD AUTO: 5.19 K/UL (ref 1.82–7.42)
NEUTROPHILS NFR BLD: 50.7 % (ref 44–72)
NRBC # BLD AUTO: 0 K/UL
NRBC BLD-RTO: 0 /100 WBC
PLATELET # BLD AUTO: 227 K/UL (ref 164–446)
PMV BLD AUTO: 10.7 FL (ref 9–12.9)
POTASSIUM SERPL-SCNC: 3.9 MMOL/L (ref 3.6–5.5)
RBC # BLD AUTO: 4.74 M/UL (ref 4.7–6.1)
SODIUM SERPL-SCNC: 139 MMOL/L (ref 135–145)
TRIGL SERPL-MCNC: 79 MG/DL (ref 0–149)
TSH SERPL DL<=0.005 MIU/L-ACNC: 1.77 UIU/ML (ref 0.38–5.33)
WBC # BLD AUTO: 10.2 K/UL (ref 4.8–10.8)

## 2020-12-08 PROCEDURE — 85025 COMPLETE CBC W/AUTO DIFF WBC: CPT

## 2020-12-08 PROCEDURE — 82570 ASSAY OF URINE CREATININE: CPT

## 2020-12-08 PROCEDURE — 82043 UR ALBUMIN QUANTITATIVE: CPT

## 2020-12-08 PROCEDURE — 80061 LIPID PANEL: CPT

## 2020-12-08 PROCEDURE — G0472 HEP C SCREEN HIGH RISK/OTHER: HCPCS

## 2020-12-08 PROCEDURE — 83036 HEMOGLOBIN GLYCOSYLATED A1C: CPT

## 2020-12-08 PROCEDURE — 36415 COLL VENOUS BLD VENIPUNCTURE: CPT

## 2020-12-08 PROCEDURE — 80048 BASIC METABOLIC PNL TOTAL CA: CPT

## 2020-12-08 PROCEDURE — 84460 ALANINE AMINO (ALT) (SGPT): CPT

## 2020-12-08 PROCEDURE — 84443 ASSAY THYROID STIM HORMONE: CPT

## 2020-12-09 DIAGNOSIS — Z79.4 TYPE 2 DIABETES MELLITUS WITHOUT COMPLICATION, WITH LONG-TERM CURRENT USE OF INSULIN (HCC): Chronic | ICD-10-CM

## 2020-12-09 DIAGNOSIS — E11.9 TYPE 2 DIABETES MELLITUS WITHOUT COMPLICATION, WITH LONG-TERM CURRENT USE OF INSULIN (HCC): Chronic | ICD-10-CM

## 2020-12-09 NOTE — TELEPHONE ENCOUNTER
----- Message from Vincent Ding M.D. sent at 12/8/2020  3:01 PM PST -----    We have reviewed your recent test(s):    Liver enzyme,  metabolic panel,  kidney function test,  thyroid test,  anemia test:  are within acceptable levels.    Cholesterol :     165                 [Desirable range = below 200]  LDL [bad cholesterol]: 110      [Desirable range = below 100]  Please start/continue with low fat low cholesterol diet, continue to exercise regularly and maintain an ideal weight.                         Dr Ding

## 2020-12-10 ENCOUNTER — TELEPHONE (OUTPATIENT)
Dept: MEDICAL GROUP | Facility: PHYSICIAN GROUP | Age: 55
End: 2020-12-10

## 2020-12-10 NOTE — TELEPHONE ENCOUNTER
Spoke to pt on the phone, pt verbalized understanding of lab results, Lantus change, and the referral to endocrinology.    Anais MORALES RN, BSN

## 2020-12-10 NOTE — TELEPHONE ENCOUNTER
----- Message from Vincent Ding M.D. sent at 12/9/2020 11:47 AM PST -----    We have reviewed your recent test(s):    A1c [diabetes test]   8.8    =   uncontrolled  diabetes . pls change the dose of Lantus [insulin glargine] from 30u qhs to 33 u qhs.  Please start/continue with low sweet low carb diet, continue with regular exercises / walking and maintain an ideal weight.  A referral has been submitted to ENDOCRINOLOGY  for further evaluation.        Dr Ding

## 2021-01-18 ENCOUNTER — TELEPHONE (OUTPATIENT)
Dept: MEDICAL GROUP | Facility: PHYSICIAN GROUP | Age: 56
End: 2021-01-18

## 2021-01-18 RX ORDER — INSULIN GLARGINE 100 [IU]/ML
30 INJECTION, SOLUTION SUBCUTANEOUS EVERY EVENING
Qty: 5 EACH | Refills: 6 | Status: SHIPPED | OUTPATIENT
Start: 2021-01-18 | End: 2022-02-07

## 2021-01-18 NOTE — TELEPHONE ENCOUNTER
VOICEMAIL  1. Caller Name: Celso                        Call Back Number: [phone] (home)       2. Message: Pt called requesting refill for Lantus    3. Patient approves office to leave a detailed voicemail/MyChart message: N\A    Please advise. I am unsure which Type 2 diabetes mellitus options to pick from and I am unable to order this Rx for the pt from my Epic.    Thank you!

## 2021-04-13 ENCOUNTER — OFFICE VISIT (OUTPATIENT)
Dept: ENDOCRINOLOGY | Facility: MEDICAL CENTER | Age: 56
End: 2021-04-13
Attending: INTERNAL MEDICINE
Payer: MEDICARE

## 2021-04-13 VITALS
WEIGHT: 135.9 LBS | BODY MASS INDEX: 20.13 KG/M2 | DIASTOLIC BLOOD PRESSURE: 70 MMHG | SYSTOLIC BLOOD PRESSURE: 116 MMHG | HEIGHT: 69 IN | OXYGEN SATURATION: 96 %

## 2021-04-13 DIAGNOSIS — E11.65 TYPE 2 DIABETES MELLITUS WITH HYPERGLYCEMIA, WITH LONG-TERM CURRENT USE OF INSULIN (HCC): ICD-10-CM

## 2021-04-13 DIAGNOSIS — R80.9 MICROALBUMINURIA: ICD-10-CM

## 2021-04-13 DIAGNOSIS — E11.9 TYPE 2 DIABETES MELLITUS WITHOUT COMPLICATION, WITH LONG-TERM CURRENT USE OF INSULIN (HCC): ICD-10-CM

## 2021-04-13 DIAGNOSIS — F31.81 BIPOLAR 2 DISORDER, MAJOR DEPRESSIVE EPISODE (HCC): ICD-10-CM

## 2021-04-13 DIAGNOSIS — Z79.4 TYPE 2 DIABETES MELLITUS WITH HYPERGLYCEMIA, WITH LONG-TERM CURRENT USE OF INSULIN (HCC): ICD-10-CM

## 2021-04-13 DIAGNOSIS — Z79.4 TYPE 2 DIABETES MELLITUS WITHOUT COMPLICATION, WITH LONG-TERM CURRENT USE OF INSULIN (HCC): ICD-10-CM

## 2021-04-13 DIAGNOSIS — E78.5 HYPERLIPIDEMIA, UNSPECIFIED HYPERLIPIDEMIA TYPE: ICD-10-CM

## 2021-04-13 LAB
HBA1C MFR BLD: 10.5 % (ref 0–5.6)
INT CON NEG: ABNORMAL
INT CON POS: ABNORMAL

## 2021-04-13 PROCEDURE — 83036 HEMOGLOBIN GLYCOSYLATED A1C: CPT | Performed by: NURSE PRACTITIONER

## 2021-04-13 PROCEDURE — 99214 OFFICE O/P EST MOD 30 MIN: CPT | Performed by: NURSE PRACTITIONER

## 2021-04-13 PROCEDURE — 99213 OFFICE O/P EST LOW 20 MIN: CPT | Performed by: NURSE PRACTITIONER

## 2021-04-13 NOTE — PROGRESS NOTES
RN-CDE Note    Subjective:   Endocrinology Clinic Progress Note  PCP: RAFA Koch    HPI:  Celso Rose is a 56 y.o. old patient who is seen today for review of Type 2 Diabetes.  He moved here from [place] in 2017.      DM:   Last A1c:   Lab Results   Component Value Date/Time    HBA1C 8.8 (H) 12/08/2020 08:51 AM      Previous A1c was 8.8 on 12/8/20.  A1C GOAL: < 7    Diabetes Medications:   Lantus 26 units daily  Taking above medications as prescribed: yes  Taking daily ASA: No    Exercise: Walking daily  Diet: States eats what he wants when he wants  Patient's body mass index is 20.07 kg/m². Exercise and nutrition counseling were performed at this visit.    Glucose monitoring frequency: Not testing blood sugars    Hypoglycemic episodes: yes - at night if doesn't  Last Retinal Exam: needs  Daily Foot Exam: Yes   Foot Exam:  Monofilament: done  Monofilament testing with a 10 gram force: sensation intact: intact bilaterally  Visual Inspection: Feet without maceration, ulcers, fissures.  Pedal pulses: intact bilaterally   Lab Results   Component Value Date/Time    MALBCRT 8 12/08/2020 08:52 AM    MICROALBUR 1.6 12/08/2020 08:52 AM      ACR Albumin/Creatinine Ratio goal <30   Currently Rx ACE/ARB: No   Dyslipidemia:  Lab Results   Component Value Date/Time    CHOLSTRLTOT 165 12/08/2020 08:51 AM     (H) 12/08/2020 08:51 AM    HDL 39 (A) 12/08/2020 08:51 AM    TRIGLYCERIDE 79 12/08/2020 08:51 AM       Currently Rx Statin: No     He  reports that he has been smoking cigarettes. He has a 1.00 pack-year smoking history. He has never used smokeless tobacco.      Plan:     Discussed and educated on:   - All medications, side effects and compliance (discussed carefully)  - Annual eye examinations at Ophthalmology  - Home glucose monitoring emphasized  - Weight control and daily exercise    Recommended medication changes: He will need lab work done to see if he is Type 1 or Type 2.

## 2021-04-13 NOTE — PROGRESS NOTES
Chief Complaint:  Consult requested by RAFA Koch for initial evaluation of Type 2 Diabetes Mellitus    HPI:   Celso Rose is a 56 y.o. male with Type 2 Diabetes Mellitus diagnosed in 41, living in [place] at the time. Denies hospitalizations for DKA in the past.    Patient has one kidney that was reported to him several years ago when he lived in [place].     He monitors blood glucose zero times per day. Blood glucose values range: 0.    Reports hypoglycemic episodes occurring 1 to 2 times per year and are mild. Reports hypoglycemic unawareness. Denies episodes of severe hypoglycemia requiring third party assistance.  He is not wearing a medical alert bracelet or necklace.  He does not a glucagon emergency kit.    Reports attending diabetes education classes @ [employer].  Diet: 1-2 meals, sometimes patient doesn't eat.     Diabetes Complications   Denies history of retinopathy. Denies laser eye surgery. Last eye exam: 2019 [facility]. Reports starting Macular Degeneration.  Denies history of peripheral sensory neuropathy.  Denies numbness, burning or tingling in BUE and BLE. Denies history of foot sores. Denies history of kidney damage, states only 1 working kidney.  He is not on ACE inhibitor or ARB-Prescribed Lisinopril and refuses to take it. Denies history of coronary artery disease. Denies history of stroke and denies TIA. Denies history of PAD. Denies history of hyperlipidemia-Rx for liptor-refuses to take it.       ROS:     CONS:     No fever, no chills, no weight loss, no fatigue   EYES:      No diplopia, no blurry vision, no redness of eyes, no swelling of eyelids   ENT:    No hearing loss, No ear pain, No sore throat, no dysphagia, no neck swelling   CV:     No chest pain, no palpitations, no claudication, no orthopnea, no PND   PULM:    No SOB, no cough, no hemoptysis, no wheezing    GI:   No nausea, no vomiting, no diarrhea, no constipation, no bloody stools   :   Passing urine well, no dysuria, no hematuria   ENDO:   No polyuria, no polydipsia, no heat intolerance, no cold intolerance   NEURO: No headaches, no dizziness, no convulsions, no tremors   MUSC:  No joint swellings, no arthralgias, no myalgias, no weakness   SKIN:   No rash, no ulcers, no dry skin   PSYCH:   No depression, no anxiety, no difficulty sleeping       Past Medical History:  Patient Active Problem List    Diagnosis Date Noted   • IBS (irritable bowel syndrome) 12/04/2020   • Type 2 diabetes mellitus ([facility]) 12/04/2020   • Otalgia of both ears 12/04/2020   • Non-compliance 10/16/2019   • Cannabis dependence, continuous ([facility]) 01/30/2019   • Bipolar 2 disorder, major depressive episode ([facility]) 07/19/2018   • Tobacco abuse 07/18/2018       Past Surgical History:  Past Surgical History:   Procedure Laterality Date   • KNEE ARTHROSCOPY     • SHOULDER ARTHROSCOPY          Allergies:  Patient has no known allergies.     Current Medications:    Current Outpatient Medications:   •  insulin glargine (LANTUS SOLOSTAR) 100 UNIT/ML Solution Pen-injector injection, Inject 30 Units under the skin every evening., Disp: 5 Each, Rfl: 6  •  Insulin Pen Needle 32 G x 4 mm (BD PEN NEEDLE ADA U/F), USE 1 PEN NEEDLE WITH LANTUS ONCE DAILY *E11.65*, Disp: 100 Each, Rfl: 11  •  Blood Glucose Monitoring Suppl Supplies Misc, Test strips order: Test strips for Bernard Contour Next meter. Sig: use 3 times daily and prn ssx high or low sugar (Patient not taking: Reported on 4/13/2021), Disp: 100 Strip, Rfl: 6    Social History:  Social History     Socioeconomic History   • Marital status: Single     Spouse name: Not on file   • Number of children: Not on file   • Years of education: Not on file   • Highest education level: Not on file   Occupational History   • Not on file   Tobacco Use   • Smoking status: Former Smoker     Packs/day: 1.00     Years: 1.00     Pack years: 1.00     Types: Cigarettes     Quit date: 2/1/2021     Years since  "quittin.1   • Smokeless tobacco: Never Used   • Tobacco comment: started smoking at age 14   Substance and Sexual Activity   • Alcohol use: No   • Drug use: Yes     Frequency: 7.0 times per week     Types: Marijuana   • Sexual activity: Yes     Partners: Female     Birth control/protection: Condom   Other Topics Concern   • Not on file   Social History Narrative   • Not on file     Social Determinants of Health     Financial Resource Strain:    • Difficulty of Paying Living Expenses:    Food Insecurity:    • Worried About Running Out of Food in the Last Year:    • Ran Out of Food in the Last Year:    Transportation Needs:    • Lack of Transportation (Medical):    • Lack of Transportation (Non-Medical):    Physical Activity:    • Days of Exercise per Week:    • Minutes of Exercise per Session:    Stress:    • Feeling of Stress :    Social Connections:    • Frequency of Communication with Friends and Family:    • Frequency of Social Gatherings with Friends and Family:    • Attends [employer] Services:    • Active Member of Clubs or Organizations:    • Attends Club or Organization Meetings:    • Marital Status:    Intimate Partner Violence:    • Fear of Current or Ex-Partner:    • Emotionally Abused:    • Physically Abused:    • Sexually Abused:         Family History:   Family History   Problem Relation Age of Onset   • Heart Disease Mother    • Diabetes Father    • Hypertension Sister    • Alcohol/Drug Sister        PHYSICAL EXAM:   Vital signs: /70   Ht 1.753 m (5' 9\")   Wt 61.6 kg (135 lb 14.4 oz)   SpO2 96%   BMI 20.07 kg/m²   GENERAL: Well-developed, well-nourished  in no apparent distress.   EYE: No ocular and eyelid asymmetry, Anicteric sclerae,  PERRL, No exophthalmos or lidlag  HENT: Hearing grossly intact, Normocephalic, atraumatic. Pink, moist mucous membranes, No exudate  NECK: Supple. Trachea midline.   CARDIOVASCULAR: Regular rate and rhythm. No murmurs, rubs, or gallops.   LUNGS: Clear to " auscultation bilaterally   ABDOMEN: Soft, nontender with positive bowel sounds.   EXTREMITIES: No clubbing, cyanosis, or edema.   NEUROLOGICAL: Cranial nerves II-XII are grossly intact   Symmetric reflexes at the patella no proximal muscle weakness, No visible tremor with both outstretched hands  LYMPH: No cervical, supraclavicular,  adenopathy palpated.   SKIN: No rashes, lesions. Turgor is normal.  FOOT: Normal sensation to monofilament testing, normal pulses, no ulcers.  Normal Vibration quantitative sensation test.      ASSESSMENT/PLAN:     1. Type 2 diabetes mellitus without complication, with long-term current use of insulin (HCC)  Unstable.  Order C-peptide and ALLI levels to assess insulin availability at this time.    Diabetes regimen:  Jardiance 25 mg daily-given samples at last week visit.  Lantus 26 units daily.  Metformin 1000 twice daily.    Detailed discussion with patient regarding what treatment plan he will be compliant with.  Patient understands that his glucose levels need to average in the 150s.  Patient very interested in CGM monitoring device.  Explained to patient that he needs to show provider he can check his glucose levels multiple times throughout the day and document those results.  Patient to bring this information in with him at his next appointment next week.    Recommend patient lower his carbohydrate intake, reduce drinking sodas sugary coffees, juices.  Patient encouraged to drink 2L to 3  of water each day.  Dilated eye exam is past due and patient needs to make this appointment.  Daily foot inspections are recommended.  150 minutes of exercise are recommended each week.        2. Microalbuminuria  Unstable.  Currently not taking lisinopril.  Would like to discuss restarting this in the future.       3. Hyperlipidemia, unspecified hyperlipidemia type  Unstable.  Currently not taking Lipitor.  Has had no side effects in the past.  Patient would like to further address this at in the  future.    Next appointment in 1 week.  Provider wants to ensure that patient is compliant and wanting to work towards the same goal of A1c in the 7% range.     Thank you kindly for allowing me to participate in the diabetes care plan for this patient.     SMITH Esposito

## 2021-04-15 ENCOUNTER — TELEPHONE (OUTPATIENT)
Dept: ENDOCRINOLOGY | Facility: MEDICAL CENTER | Age: 56
End: 2021-04-15

## 2021-04-16 ENCOUNTER — HOSPITAL ENCOUNTER (OUTPATIENT)
Dept: LAB | Facility: MEDICAL CENTER | Age: 56
End: 2021-04-16
Attending: NURSE PRACTITIONER
Payer: MEDICARE

## 2021-04-16 DIAGNOSIS — Z79.4 TYPE 2 DIABETES MELLITUS WITHOUT COMPLICATION, WITH LONG-TERM CURRENT USE OF INSULIN (HCC): ICD-10-CM

## 2021-04-16 DIAGNOSIS — E11.9 TYPE 2 DIABETES MELLITUS WITHOUT COMPLICATION, WITH LONG-TERM CURRENT USE OF INSULIN (HCC): ICD-10-CM

## 2021-04-16 PROCEDURE — 84681 ASSAY OF C-PEPTIDE: CPT

## 2021-04-16 PROCEDURE — 36415 COLL VENOUS BLD VENIPUNCTURE: CPT

## 2021-04-16 PROCEDURE — 86341 ISLET CELL ANTIBODY: CPT

## 2021-04-16 PROCEDURE — 82306 VITAMIN D 25 HYDROXY: CPT

## 2021-04-19 LAB
25(OH)D3 SERPL-MCNC: 13 NG/ML (ref 30–80)
C PEPTIDE SERPL-MCNC: 0.2 NG/ML (ref 0.8–3.5)

## 2021-04-20 ENCOUNTER — OFFICE VISIT (OUTPATIENT)
Dept: ENDOCRINOLOGY | Facility: MEDICAL CENTER | Age: 56
End: 2021-04-20
Attending: NURSE PRACTITIONER
Payer: MEDICARE

## 2021-04-20 VITALS
BODY MASS INDEX: 19.92 KG/M2 | DIASTOLIC BLOOD PRESSURE: 70 MMHG | OXYGEN SATURATION: 94 % | SYSTOLIC BLOOD PRESSURE: 116 MMHG | HEART RATE: 94 BPM | HEIGHT: 69 IN | WEIGHT: 134.5 LBS

## 2021-04-20 DIAGNOSIS — E78.5 HYPERLIPIDEMIA, UNSPECIFIED HYPERLIPIDEMIA TYPE: ICD-10-CM

## 2021-04-20 DIAGNOSIS — F31.81 BIPOLAR 2 DISORDER, MAJOR DEPRESSIVE EPISODE (HCC): ICD-10-CM

## 2021-04-20 DIAGNOSIS — E55.9 VITAMIN D DEFICIENCY: ICD-10-CM

## 2021-04-20 DIAGNOSIS — R80.9 MICROALBUMINURIA: ICD-10-CM

## 2021-04-20 DIAGNOSIS — E10.65 UNCONTROLLED TYPE 1 DIABETES MELLITUS WITH HYPERGLYCEMIA (HCC): ICD-10-CM

## 2021-04-20 PROCEDURE — 99213 OFFICE O/P EST LOW 20 MIN: CPT | Performed by: NURSE PRACTITIONER

## 2021-04-20 PROCEDURE — 99214 OFFICE O/P EST MOD 30 MIN: CPT | Performed by: NURSE PRACTITIONER

## 2021-04-20 RX ORDER — INSULIN ASPART 100 [IU]/ML
10 INJECTION, SOLUTION INTRAVENOUS; SUBCUTANEOUS
Qty: 15 ML | Refills: 6 | Status: SHIPPED | OUTPATIENT
Start: 2021-04-20 | End: 2022-06-07 | Stop reason: SDUPTHER

## 2021-04-20 RX ORDER — ERGOCALCIFEROL 1.25 MG/1
50000 CAPSULE ORAL
Qty: 12 CAPSULE | Refills: 3 | Status: SHIPPED | OUTPATIENT
Start: 2021-04-20 | End: 2022-02-14

## 2021-04-20 RX ORDER — INSULIN LISPRO 100 [IU]/ML
10 INJECTION, SOLUTION INTRAVENOUS; SUBCUTANEOUS
COMMUNITY
End: 2021-05-25

## 2021-04-20 NOTE — PROGRESS NOTES
RN-CDE Note    Subjective:   Endocrinology Clinic Progress Note  PCP: Pcp Pt States None    HPI:  Celso Rose is a 56 y.o. old patient who is seen today for review of Type 1 diabetes.  He needs surgery on his teeth and can not have it done until his HBA1C is under 7.    DM:   Last A1c:   Lab Results   Component Value Date/Time    HBA1C 10.5 (A) 04/13/2021 07:38 AM        A1C GOAL: < 7    Diabetes Medications:   Lantus 26 units daily  Taking above medications as prescribed: yes  Taking daily ASA: No    Exercise: Very active  Diet: Eating 1-2 times daily.  Patient's body mass index is 19.86 kg/m². Exercise and nutrition counseling were performed at this visit.    Glucose monitoring frequency: Testing four times daily.  's  Hypoglycemic episodes: yes - With activity and not eating  Last Retinal Exam: needs  Daily Foot Exam: Yes   Foot Exam:  Monofilament: done  Monofilament testing with a 10 gram force: sensation intact: intact bilaterally  Visual Inspection: Feet without maceration, ulcers, fissures.  Pedal pulses: intact bilaterally   Lab Results   Component Value Date/Time    MALBCRT 8 12/08/2020 08:52 AM    MICROALBUR 1.6 12/08/2020 08:52 AM      ACR Albumin/Creatinine Ratio goal <30   Currently Rx ACE/ARB: No   Dyslipidemia:  Lab Results   Component Value Date/Time    CHOLSTRLTOT 165 12/08/2020 08:51 AM     (H) 12/08/2020 08:51 AM    HDL 39 (A) 12/08/2020 08:51 AM    TRIGLYCERIDE 79 12/08/2020 08:51 AM       Currently Rx Statin: No     He  reports that he quit smoking about 2 months ago. His smoking use included cigarettes. He has a 1.00 pack-year smoking history. He has never used smokeless tobacco.      Plan:     Discussed and educated on:   - All medications, side effects and compliance (discussed carefully)  - Annual eye examinations at [facility]  - Home glucose monitoring emphasized  - Weight control and daily exercise    Recommended medication changes: He will start Humalog 3 units  with a small meal and 5-6 units with a large carbohydrate meal.  He will also get started on the Nicholas.

## 2021-04-20 NOTE — PROGRESS NOTES
CHIEF COMPLAINT: Patient is here for follow up of confirmed Type 1 Diabetes Mellitus.    HPI:     Celso Rose is a very pleasant 56 y.o. male  for continued evaluation & treatment of the following:     Provider following patient closely for compliance as he is trying to qualify for dental procedure.  He needs multiple extractions however his glycohemoglobin needs to be around 7%.  History of bipolar disorder with noncompliance of recommended prescription therapy.      1.  Confirmed Type 1 Diabetes Mellitus      Ref. Range 4/16/2021 07:41   C-Peptide Latest Ref Range: 0.8 - 3.5 ng/mL 0.2 (L)     Current diabetes regimen:  Jardiance 25 mg daily-given samples at last week visit.  Lantus 26 units daily.  Metformin 1000 twice daily.    Serum A1c 04/13/2021 10.5%    BG Diary:04/20/21 currently checking blood sugars 4  times per day.  Patient very interested in starting freestyle reynaldo 2.  Motivated to checking his blood sugars several times throughout the day.  Average glucose: 75-300s.    Patient denies hypoglycemic events.  Patient states he is not sure if he is hypoglycemic aware.    Weight unchanged from prior appointment.    Patient brought in KUB report completed 07/29/2003 in [facility].  Report states nonvisualization of left kidney suggestive of complete absence of the left kidney versus a malpositioned kidney.  Further evaluation a CT of the abdomen and pelvis with and without contrast are recommended.  Right kidney and bladder are normal.    Diabetes Complications   Retinopathy: No known retinopathy.  Last eye exam: Last eye exam: 2019 [facility]. Reports starting Macular Degeneration.   Neuropathy: Denies numbness and tingling in BUE and BLE.  Exercise: Minimal.  Diet: Fair.    Patient currently not taking lisinopril 2.5 mg daily or Lipitor 10 mg daily.     Ref. Range 12/8/2020 08:52   Micro Alb Creat Ratio Latest Ref Range: 0 - 30 mg/g 8   Creatinine, Urine Latest Units: mg/dL  211.88   Microalbumin, Urine Random Latest Units: mg/dL 1.6      Ref. Range 12/8/2020 08:51   Cholesterol,Tot Latest Ref Range: 100 - 199 mg/dL 165   Triglycerides Latest Ref Range: 0 - 149 mg/dL 79   HDL Latest Ref Range: >=40 mg/dL 39 (A)   LDL Latest Ref Range: <100 mg/dL 110 (H)     Not currently taking vitamin D supplementation.     Ref. Range 4/16/2021 07:41   25-Hydroxy   Vitamin D 25 Latest Ref Range: 30 - 80 ng/mL 13 (L)       Patient's medications, allergies, and social histories were reviewed and updated as appropriate.              ROS:     CONS:     No fever, no chills, no weight loss, no fatigue   EYES:      No diplopia, no blurry vision, no redness of eyes, no swelling of eyelids   ENT:    No hearing loss, No ear pain, No sore throat, no dysphagia, no neck swelling   CV:     No chest pain, no palpitations, no claudication, no orthopnea, no PND   PULM:    No SOB, no cough, no hemoptysis, no wheezing    GI:   No nausea, no vomiting, no diarrhea, no constipation, no bloody stools   :  Passing urine well, no dysuria, no hematuria   ENDO:   No polyuria, no polydipsia, no heat intolerance, no cold intolerance   NEURO: No headaches, no dizziness, no convulsions, no tremors   MUSC:  No joint swellings, no arthralgias, no myalgias, no weakness   SKIN:   No rash, no ulcers, no dry skin   PSYCH:   No depression, no anxiety, no difficulty sleeping       Past Medical History:  Patient Active Problem List    Diagnosis Date Noted   • IBS (irritable bowel syndrome) 12/04/2020   • Type 2 diabetes mellitus ([facility]) 12/04/2020   • Otalgia of both ears 12/04/2020   • Non-compliance 10/16/2019   • Cannabis dependence, continuous ([facility]) 01/30/2019   • Bipolar 2 disorder, major depressive episode ([facility]) 07/19/2018   • Tobacco abuse 07/18/2018       Past Surgical History:  Past Surgical History:   Procedure Laterality Date   • KNEE ARTHROSCOPY     • SHOULDER ARTHROSCOPY          Allergies:  Patient has no known allergies.      Current Medications:    Current Outpatient Medications:   •  Blood Glucose Test Strips, Test strips order: Test strips for Bernard Contour Next meter. Sig: use 3 times daily and prn ssx high or low sugar, Disp: 100 Each, Rfl: 3  •  insulin glargine (LANTUS SOLOSTAR) 100 UNIT/ML Solution Pen-injector injection, Inject 30 Units under the skin every evening., Disp: 5 Each, Rfl: 6  •  Insulin Pen Needle 32 G x 4 mm (BD PEN NEEDLE ADA U/F), USE 1 PEN NEEDLE WITH LANTUS ONCE DAILY *E11.65*, Disp: 100 Each, Rfl: 11    Social History:  Social History     Socioeconomic History   • Marital status: Single     Spouse name: Not on file   • Number of children: Not on file   • Years of education: Not on file   • Highest education level: Not on file   Occupational History   • Not on file   Tobacco Use   • Smoking status: Former Smoker     Packs/day: 1.00     Years: 1.00     Pack years: 1.00     Types: Cigarettes     Quit date: 2021     Years since quittin.2   • Smokeless tobacco: Never Used   • Tobacco comment: started smoking at age 14   Substance and Sexual Activity   • Alcohol use: No   • Drug use: Yes     Frequency: 7.0 times per week     Types: Marijuana   • Sexual activity: Yes     Partners: Female     Birth control/protection: Condom   Other Topics Concern   • Not on file   Social History Narrative   • Not on file     Social Determinants of Health     Financial Resource Strain:    • Difficulty of Paying Living Expenses:    Food Insecurity:    • Worried About Running Out of Food in the Last Year:    • Ran Out of Food in the Last Year:    Transportation Needs:    • Lack of Transportation (Medical):    • Lack of Transportation (Non-Medical):    Physical Activity:    • Days of Exercise per Week:    • Minutes of Exercise per Session:    Stress:    • Feeling of Stress :    Social Connections:    • Frequency of Communication with Friends and Family:    • Frequency of Social Gatherings with Friends and Family:    •  "Attends [employer] Services:    • Active Member of Clubs or Organizations:    • Attends Club or Organization Meetings:    • Marital Status:    Intimate Partner Violence:    • Fear of Current or Ex-Partner:    • Emotionally Abused:    • Physically Abused:    • Sexually Abused:         Family History:   Family History   Problem Relation Age of Onset   • Heart Disease Mother    • Diabetes Father    • Hypertension Sister    • Alcohol/Drug Sister        PHYSICAL EXAM:   Vital signs: /70   Pulse 94   Ht 1.753 m (5' 9\")   Wt 61 kg (134 lb 8 oz)   SpO2 94%   BMI 19.86 kg/m²   GENERAL: Well-developed, well-nourished  in no apparent distress.   EYE: No ocular and eyelid asymmetry, Anicteric sclerae,  PERRL, No exophthalmos or lidlag  HENT: Hearing grossly intact, Normocephalic, atraumatic. Pink, moist mucous membranes, No exudate  NECK: Supple. Trachea midline.   CARDIOVASCULAR: Regular rate and rhythm. No murmurs, rubs, or gallops.   LUNGS: Clear to auscultation bilaterally   ABDOMEN: Soft, nontender with positive bowel sounds.   EXTREMITIES: No clubbing, cyanosis, or edema.   NEUROLOGICAL: Cranial nerves II-XII are grossly intact   Symmetric reflexes at the patella no proximal muscle weakness, No visible tremor with both outstretched hands  LYMPH: No cervical, supraclavicular,  adenopathy palpated.   SKIN: No rashes, lesions. Turgor is normal.  FOOT: Normal sensation to monofilament testing, normal pulses, no ulcers.  Normal Vibration quantitative sensation test.      ASSESSMENT/PLAN:     1. Type 2 diabetes mellitus without complication, with long-term current use of insulin (HCC)  Unstable.  Patient is doing better today.  Appears brighter and more positive at this time.  Patient highly motivated to get blood sugars controlled and be approved for dental surgery.  FreeFarmDrop[employer] reynaldo  2 CGM applied and started at this visit.    Confirmed type 1 diabetes with undetectable C-peptide level.  Discontinue Metformin and " Jardiance.  Continue Lantus 26 units daily.  Recommend starting Novlog, 3 units before small meals and 5 to 6 units before large meals.  Detailed discussion with patient to check glucose before meals and if greater than 200 take 6 units NovoLog before eating.  Detailed discussion with patient to check 2-hour postprandial glucose and if greater than 180 take 1 to 2 units of NovoLog.  Explained to patient he will be successful if he is consistent in his mealtimes and taking his medications as recommended.    Recommend patient lower his carbohydrate intake, reduce drinking sodas sugary coffees, juices.  Patient encouraged to drink 2L to 3  of water each day.  Dilated eye exam is past due and patient needs to make this appointment.  Daily foot inspections are recommended.  150 minutes of exercise are recommended each week.      2. Microalbuminuria  Unstable.  Patient would like to discuss restarting lisinopril at next appointment.      3. Bipolar 2 disorder, major depressive episode (HCC)  Stable.  At this time patient is alert and oriented x4 and seemingly pleasant and responding positively to all questions.  We will continue to monitor.    4. Hyperlipidemia, unspecified hyperlipidemia type  Unstable.  Patient would like to further address this at next appointment we will consider restarting Lipitor in the future.    5. Vitamin D deficiency  Unstable.  Recommend vitamin D 50,000 IU weekly.    Repeat A1c at next appointment.  Next appointment in 6 weeks.    Thank you kindly for allowing me to participate in the diabetes care plan for this patient.    Beckie Bullock, APRN  04/13/21    CC:   RAFA Koch

## 2021-04-21 LAB — GAD65 AB SER IA-ACNC: <5 IU/ML (ref 0–5)

## 2021-04-30 ENCOUNTER — TELEPHONE (OUTPATIENT)
Dept: ENDOCRINOLOGY | Facility: MEDICAL CENTER | Age: 56
End: 2021-04-30

## 2021-04-30 NOTE — TELEPHONE ENCOUNTER
Patient called in again and states he needs Magdalena or Beckie to call him and let him know what to do about this problem. States his headache is very bad and he almost had to go to ER last night.     Please advise.

## 2021-04-30 NOTE — TELEPHONE ENCOUNTER
Patient called in requesting appointment patient states he is having a hard time adjusting to his reynaldo and is having problems maintaning his blood sugar levels. States he woke up last night to his reynaldo alarm going off and his level was 77. States he has had diarrhea and vomiting because of this.       Please advise if you would like patient to do anything before upcoming appointment.       Patient booked appointment for 5/3/21 to come in and be seen.

## 2021-04-30 NOTE — TELEPHONE ENCOUNTER
Hypoglycemic.     Started Novolog. Small meals 3 units. Doesn't usually have a large meal and require 5 units.    Using Nicholas 2 continuously throughout the day.   Fasting Glucose: 67, 89, 69  Bedtime: 88, 109,     Patient didn't start Lantus 26 units.   Currently taking 20 units daily.     Patient states he's been going low at multiple times throughout the day. Patient feels low in the 60's-80's. Patient states it takes forever for his glucose to come up.   Patient instructed on how to treat low events below 75.   Patient instructed to have a snack before bed if glucose less than 100.     Patient instructed to lower lantus to 17units daily. Continue novolog 3units with small meals.     Patient instructed to get Glucose Tabs: Patient encouraged to use glucose tablets, 4 at a time if glucose less than 75.     Reiterated that the Nicholas sensor will lag behind glucometer after treating low. Pt should utilize glucometer 15 minutes after treatment to ensure level is increasing.     Pt instructed to not overeat his lows and to continue checking PP glucose levels.

## 2021-05-03 ENCOUNTER — APPOINTMENT (OUTPATIENT)
Dept: ENDOCRINOLOGY | Facility: MEDICAL CENTER | Age: 56
End: 2021-05-03
Attending: NURSE PRACTITIONER
Payer: MEDICARE

## 2021-05-17 ENCOUNTER — PATIENT OUTREACH (OUTPATIENT)
Dept: HEALTH INFORMATION MANAGEMENT | Facility: OTHER | Age: 56
End: 2021-05-17

## 2021-05-17 NOTE — PROGRESS NOTES
Attempt #: Final  [facility] Verified: yes  Verify PCP: yes  Comprehensive Geriatric Assessment  1. Scheduling Status:Scheduled      Care Gap Scheduling (Attempt to Schedule EACH Overdue Care Gap!)  Health Maintenance Due   Topic Date Due   • COVID-19 Vaccine (1) Never done   • IMM HEP B VACCINE (1 of 3 - Risk 3-dose series) Never done   • RETINAL SCREENING  02/07/2019       - Patient plans to schedule appointment for Retinal Eye Exam./ Covid vaccine    MyChart Activation: declined

## 2021-05-20 NOTE — PROGRESS NOTES
Outcome: Left Message to  Confirmed an appointment for Comprehensive Health  Assessment   Monday May 24 at 10:00AM     Please transfer to Patient Outreach Team at [phone] when patient returns call.      Attempt # 1

## 2021-05-25 ENCOUNTER — OFFICE VISIT (OUTPATIENT)
Dept: ENDOCRINOLOGY | Facility: MEDICAL CENTER | Age: 56
End: 2021-05-25
Attending: NURSE PRACTITIONER
Payer: MEDICARE

## 2021-05-25 VITALS
OXYGEN SATURATION: 97 % | BODY MASS INDEX: 19.85 KG/M2 | HEART RATE: 88 BPM | SYSTOLIC BLOOD PRESSURE: 110 MMHG | WEIGHT: 131 LBS | HEIGHT: 68 IN | DIASTOLIC BLOOD PRESSURE: 60 MMHG

## 2021-05-25 DIAGNOSIS — E55.9 VITAMIN D DEFICIENCY: ICD-10-CM

## 2021-05-25 DIAGNOSIS — E78.5 HYPERLIPIDEMIA ASSOCIATED WITH TYPE 2 DIABETES MELLITUS (HCC): ICD-10-CM

## 2021-05-25 DIAGNOSIS — Z72.0 TOBACCO ABUSE: ICD-10-CM

## 2021-05-25 DIAGNOSIS — E10.65 UNCONTROLLED TYPE 1 DIABETES MELLITUS WITH HYPERGLYCEMIA (HCC): ICD-10-CM

## 2021-05-25 DIAGNOSIS — E11.69 HYPERLIPIDEMIA ASSOCIATED WITH TYPE 2 DIABETES MELLITUS (HCC): ICD-10-CM

## 2021-05-25 DIAGNOSIS — E53.8 VITAMIN B 12 DEFICIENCY: ICD-10-CM

## 2021-05-25 PROBLEM — F12.20 CANNABIS DEPENDENCE, CONTINUOUS (HCC): Chronic | Status: ACTIVE | Noted: 2019-01-30

## 2021-05-25 PROBLEM — E46 MALNUTRITION (HCC): Status: ACTIVE | Noted: 2021-05-25

## 2021-05-25 PROBLEM — E46 MALNUTRITION (HCC): Chronic | Status: ACTIVE | Noted: 2021-05-25

## 2021-05-25 PROBLEM — H92.03 OTALGIA OF BOTH EARS: Status: RESOLVED | Noted: 2020-12-04 | Resolved: 2021-05-25

## 2021-05-25 PROBLEM — Z91.199 NON-COMPLIANCE: Chronic | Status: ACTIVE | Noted: 2019-10-16

## 2021-05-25 PROCEDURE — 99213 OFFICE O/P EST LOW 20 MIN: CPT | Performed by: NURSE PRACTITIONER

## 2021-05-25 PROCEDURE — 95251 CONT GLUC MNTR ANALYSIS I&R: CPT | Performed by: NURSE PRACTITIONER

## 2021-05-25 PROCEDURE — 99214 OFFICE O/P EST MOD 30 MIN: CPT | Mod: 25 | Performed by: NURSE PRACTITIONER

## 2021-05-25 NOTE — PROGRESS NOTES
CHIEF COMPLAINT: Patient is here for follow up of confirmed Type 1 Diabetes Mellitus.    HPI:     Celso Rose is a very pleasant 56 y.o. male  for continued evaluation & treatment of the following:     Provider continues to follow patient closely for compliance as he is trying to qualify for dental procedure.  He needs multiple extractions however his glycohemoglobin needs to be around 7%. Patient is currently rethinking the surgery as it is a very large expense for him and he could utilize this finances other places. History of bipolar disorder with noncompliance of recommended prescription therapy. Patient is currently very pleasant and engaged in his diabetes wellness plan.      1.  Confirmed Type 1 Diabetes Mellitus      Ref. Range 4/16/2021 07:41   C-Peptide Latest Ref Range: 0.8 - 3.5 ng/mL 0.2 (L)     Current diabetes regimen:  Novolog 3-4 units with each meal. 3 with small meals and 5-6 with large meals  Lantus 17 units daily.      Serum A1c 04/13/2021 10.5%    BG Diary  05/25/2021 patient utilizing freestyle reynaldo 2 CGM device.  Patient has improved motivation.    Average glucose: 75-300s.    Patient reports hypoglycemic events. Patient did obtain glucose tabs after his last appointment. Patient states sometimes he would not pay attention to the alarms on his CGM device and continue on with his activity not treating his hypoglycemic events.    Weight unchanged from prior appointment.    Diabetes Complications   Retinopathy: No known retinopathy.  Last eye exam: Last eye exam: Overdue. 2019 [facility]. Reports starting Macular Degeneration.   Neuropathy: Denies numbness and tingling in BUE and BLE.  Exercise: Minimal.  Diet: Fair.    Patient currently not taking lisinopril 2.5 mg daily or Lipitor 10 mg daily.  BP currently 110/60.     Ref. Range 12/8/2020 08:52   Micro Alb Creat Ratio Latest Ref Range: 0 - 30 mg/g 8   Creatinine, Urine Latest Units: mg/dL 211.88   Microalbumin, Urine  Random Latest Units: mg/dL 1.6      Ref. Range 12/8/2020 08:51   Cholesterol,Tot Latest Ref Range: 100 - 199 mg/dL 165   Triglycerides Latest Ref Range: 0 - 149 mg/dL 79   HDL Latest Ref Range: >=40 mg/dL 39 (A)   LDL Latest Ref Range: <100 mg/dL 110 (H)     Vitamin D deficiency, currently taking vitamin D 50,000 IU daily.     Ref. Range 4/16/2021 07:41   25-Hydroxy   Vitamin D 25 Latest Ref Range: 30 - 80 ng/mL 13 (L)     Patient started smoking again secondary to stress. He states he is smoking intermittently and is attempting to decrease his tobacco use.     Patient's medications, allergies, and social histories were reviewed and updated as appropriate.      ROS:     CONS:     No fever, no chills, no weight loss, no fatigue   EYES:      No diplopia, no blurry vision, no redness of eyes, no swelling of eyelids   ENT:    No hearing loss, No ear pain, No sore throat, no dysphagia, no neck swelling   CV:     No chest pain, no palpitations, no claudication, no orthopnea, no PND   PULM:    No SOB, no cough, no hemoptysis, no wheezing    GI:   No nausea, no vomiting, no diarrhea, no constipation, no bloody stools   :  Passing urine well, no dysuria, no hematuria   ENDO:   No polyuria, no polydipsia, no heat intolerance, no cold intolerance   NEURO: No headaches, no dizziness, no convulsions, no tremors   MUSC:  No joint swellings, no arthralgias, no myalgias, no weakness   SKIN:   No rash, no ulcers, no dry skin   PSYCH:   No depression, no anxiety, no difficulty sleeping       Past Medical History:  Patient Active Problem List    Diagnosis Date Noted   • IBS (irritable bowel syndrome) 12/04/2020   • Type 2 diabetes mellitus ([facility]) 12/04/2020   • Otalgia of both ears 12/04/2020   • Non-compliance 10/16/2019   • Cannabis dependence, continuous ([facility]) 01/30/2019   • Bipolar 2 disorder, major depressive episode ([facility]) 07/19/2018   • Tobacco abuse 07/18/2018       Past Surgical History:  Past Surgical History:   Procedure  Laterality Date   • KNEE ARTHROSCOPY     • SHOULDER ARTHROSCOPY          Allergies:  Patient has no known allergies.     Current Medications:    Current Outpatient Medications:   •  Continuous Blood Gluc Sensor (FREESTYLE SYED 2 SENSOR) Misc, 1 Each every 14 days. He has the Syed reader already.  Type 1 on insulin E10.65, Disp: 2 Each, Rfl: 11  •  insulin aspart (NOVOLOG FLEXPEN) 100 UNIT/ML injection PEN, Inject 10 Units under the skin 3 times a day before meals., Disp: 15 mL, Rfl: 6  •  vitamin D, Ergocalciferol, (DRISDOL) 1.25 MG (41255 UT) Cap capsule, Take 1 capsule by mouth every 7 days., Disp: 12 capsule, Rfl: 3  •  Blood Glucose Test Strips, Test strips order: Test strips for [employer] Contour Next meter. Sig: use 3 times daily and prn ssx high or low sugar, Disp: 100 Each, Rfl: 3  •  insulin glargine (LANTUS SOLOSTAR) 100 UNIT/ML Solution Pen-injector injection, Inject 30 Units under the skin every evening., Disp: 5 Each, Rfl: 6  •  Insulin Pen Needle 32 G x 4 mm (BD PEN NEEDLE ADA U/F), USE 1 PEN NEEDLE WITH LANTUS ONCE DAILY *E11.65*, Disp: 100 Each, Rfl: 11    Social History:  Social History     Socioeconomic History   • Marital status: Single     Spouse name: Not on file   • Number of children: Not on file   • Years of education: Not on file   • Highest education level: Not on file   Occupational History   • Not on file   Tobacco Use   • Smoking status: Smoker, Current Status Unknown     Packs/day: 1.00     Years: 1.00     Pack years: 1.00     Types: Cigarettes     Last attempt to quit: 2021     Years since quittin.1   • Smokeless tobacco: Never Used   • Tobacco comment: started smoking at age 14 quit for a couple months then started    Vaping Use   • Vaping Use: Never used   Substance and Sexual Activity   • Alcohol use: No   • Drug use: Yes     Frequency: 7.0 times per week     Types: Marijuana   • Sexual activity: Yes     Partners: Female     Birth control/protection: Condom   Other Topics  "Concern   • Not on file   Social History Narrative   • Not on file     Social Determinants of Health     Financial Resource Strain:    • Difficulty of Paying Living Expenses:    Food Insecurity:    • Worried About Running Out of Food in the Last Year:    • Ran Out of Food in the Last Year:    Transportation Needs:    • Lack of Transportation (Medical):    • Lack of Transportation (Non-Medical):    Physical Activity:    • Days of Exercise per Week:    • Minutes of Exercise per Session:    Stress:    • Feeling of Stress :    Social Connections:    • Frequency of Communication with Friends and Family:    • Frequency of Social Gatherings with Friends and Family:    • Attends Congregational Services:    • Active Member of Clubs or Organizations:    • Attends Club or Organization Meetings:    • Marital Status:    Intimate Partner Violence:    • Fear of Current or Ex-Partner:    • Emotionally Abused:    • Physically Abused:    • Sexually Abused:         Family History:   Family History   Problem Relation Age of Onset   • Heart Disease Mother    • Diabetes Father    • Hypertension Sister    • Alcohol/Drug Sister        PHYSICAL EXAM:   Vital signs: /60   Pulse 88   Ht 1.727 m (5' 8\")   Wt 59.4 kg (131 lb)   SpO2 97%   BMI 19.92 kg/m²   GENERAL: Well-developed, well-nourished  in no apparent distress.   EYE: No ocular and eyelid asymmetry, Anicteric sclerae,  PERRL, No exophthalmos or lidlag  HENT: Hearing grossly intact, Normocephalic, atraumatic. Pink, moist mucous membranes, No exudate  NECK: Supple. Trachea midline.   CARDIOVASCULAR: Regular rate and rhythm. No murmurs, rubs, or gallops.   LUNGS: Clear to auscultation bilaterally   ABDOMEN: Soft, nontender with positive bowel sounds.   EXTREMITIES: No clubbing, cyanosis, or edema.   NEUROLOGICAL: Cranial nerves II-XII are grossly intact   Symmetric reflexes at the patella no proximal muscle weakness, No visible tremor with both outstretched hands  LYMPH: No " cervical, supraclavicular,  adenopathy palpated.   SKIN: No rashes, lesions. Turgor is normal.  FOOT: Normal sensation to monofilament testing, normal pulses, no ulcers.  Normal Vibration quantitative sensation test.      ASSESSMENT/PLAN:     1. Type 2 diabetes mellitus without complication, with long-term current use of insulin (HCC)  Unstable.  Patient continues to improve. Appears brighter and more positive at this time.  Patient highly motivated to get blood sugars controlled and be approved for dental surgery.    Continue utilizing [employer] 2 CGM. Detailed discussion with patient to treat hypoglycemic events in real-time. Patient instructed to pay attention to the low alarms on his CGM device as it is built for patients that are hypoglycemic unaware. Patient understands to ingest 4 glucose tabs when glucose gets to 70 or lower.    Recommend patient lower his carbohydrate intake, reduce drinking sodas sugary coffees, juices.  Patient encouraged to drink 2L to 3  of water each day.  Dilated eye exam is past due and patient needs to make this appointment.  Daily foot inspections are recommended.  150 minutes of exercise are recommended each week.      2. Microalbuminuria  Unstable.  Patient would like to discuss restarting lisinopril at next appointment.      3. Bipolar 2 disorder, major depressive episode (HCC)  Stable.  At this time patient is alert and oriented x4 and seemingly pleasant and responding positively to all questions.  We will continue to monitor.    4. Hyperlipidemia, unspecified hyperlipidemia type  Unstable.  Patient would like to further address this at next appointment we will consider restarting Lipitor in the future.    5. Vitamin D deficiency  Unstable.  Continue taking vitamin D 50,000 IU weekly.    Repeat A1c at next appointment.  Next appointment in 2 months    Thank you kindly for allowing me to participate in the diabetes care plan for this patient.    Beckie Bullock,  APRN  05/25/2021    CC:   RAFA Koch
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
## 2021-07-27 ENCOUNTER — APPOINTMENT (OUTPATIENT)
Dept: ENDOCRINOLOGY | Facility: MEDICAL CENTER | Age: 56
End: 2021-07-27
Attending: NURSE PRACTITIONER
Payer: MEDICARE

## 2021-08-06 NOTE — NON-PROVIDER
Outcome:left message to rescheduled enoc   Please transfer to Patient Outreach Team at [phone] when patient returns call.    Attempt # 1

## 2021-09-14 ENCOUNTER — OFFICE VISIT (OUTPATIENT)
Dept: MEDICAL GROUP | Facility: PHYSICIAN GROUP | Age: 56
End: 2021-09-14
Payer: MEDICARE

## 2021-09-14 VITALS
HEIGHT: 69 IN | BODY MASS INDEX: 18.51 KG/M2 | TEMPERATURE: 98.5 F | DIASTOLIC BLOOD PRESSURE: 62 MMHG | HEART RATE: 92 BPM | OXYGEN SATURATION: 94 % | WEIGHT: 125 LBS | SYSTOLIC BLOOD PRESSURE: 106 MMHG | RESPIRATION RATE: 16 BRPM

## 2021-09-14 DIAGNOSIS — M25.512 CHRONIC PAIN OF BOTH SHOULDERS: ICD-10-CM

## 2021-09-14 DIAGNOSIS — E11.9 TYPE 2 DIABETES MELLITUS WITHOUT COMPLICATION, WITH LONG-TERM CURRENT USE OF INSULIN (HCC): ICD-10-CM

## 2021-09-14 DIAGNOSIS — Z79.4 TYPE 2 DIABETES MELLITUS WITHOUT COMPLICATION, WITH LONG-TERM CURRENT USE OF INSULIN (HCC): ICD-10-CM

## 2021-09-14 DIAGNOSIS — G89.29 CHRONIC PAIN OF BOTH SHOULDERS: ICD-10-CM

## 2021-09-14 DIAGNOSIS — Z12.11 COLON CANCER SCREENING: ICD-10-CM

## 2021-09-14 DIAGNOSIS — M25.561 CHRONIC PAIN OF RIGHT KNEE: ICD-10-CM

## 2021-09-14 DIAGNOSIS — F41.9 ANXIETY: ICD-10-CM

## 2021-09-14 DIAGNOSIS — G89.29 CHRONIC PAIN OF RIGHT KNEE: ICD-10-CM

## 2021-09-14 DIAGNOSIS — M25.511 CHRONIC PAIN OF BOTH SHOULDERS: ICD-10-CM

## 2021-09-14 PROCEDURE — 99214 OFFICE O/P EST MOD 30 MIN: CPT | Performed by: INTERNAL MEDICINE

## 2021-09-14 NOTE — ASSESSMENT & PLAN NOTE
Patient present today complaining of recurrent anxiety symptoms noted since last several months.  Patient denies SI.  The patient requests referral to see behavioral health specialist.

## 2021-09-14 NOTE — ASSESSMENT & PLAN NOTE
Chronic condition affecting both shoulders.  The patient denies recent trauma or injury.  Pain is noted with shoulder movements.  Denies significant motor weakness or paresthesia.  Patient requests referral to see an orthopedic specialist.

## 2021-09-14 NOTE — PROGRESS NOTES
CC: Request referral to behavioral health  Request referral to orthopedic specialist bilateral shoulder pain and right knee pain      HPI: This is a 56 y.o. pt.  Pt's medical history is notable for:     Anxiety  Patient present today complaining of recurrent anxiety symptoms noted since last several months.  Patient denies SI.  The patient requests referral to see behavioral health specialist.    Type 2 diabetes mellitus (HCC)  This is a chronic condition for the patient presently followed by endocrinology service.  Patient is due for lab test.  Ordered today.  In addition a referral also submitted for the patient to see an ophthalmologist for retinal exam.    Bilateral shoulder pain  Chronic condition affecting both shoulders.  The patient denies recent trauma or injury.  Pain is noted with shoulder movements.  Denies significant motor weakness or paresthesia.  Patient requests referral to see an orthopedic specialist.    Chronic pain of right knee  Chronic condition.  Patient denies fever or chills.  He denies recent trauma or injury.  Pain is worse with weightbearing/walking.          REVIEW OF SYSTEMS:     Constitutional:  no fever / chills   Neurologic: no headaches  Eyes: no changes in vision  ENT: no sore throat, no hearing loss  CV:  no chest pain, no palpitations  Pulmonary: no SOB, no cough          Allergies: Patient has no known allergies.    Current Outpatient Medications Ordered in Epic   Medication Sig Dispense Refill   • CONTOUR NEXT TEST strip USE STRIP TO CHECK GLUCOSE THREE TIMES DAILY AND AS NEEDED FOR SIGNS AND SYMPTOMS OR LOW SUGAR     • Insulin Pen Needle 32 G x 4 mm 1 Each 4 Times a Day,Before Meals and at Bedtime. 400 Each 3   • Continuous Blood Gluc Sensor (FREESTYLE SYED 2 SENSOR) Misc 1 Each every 14 days. He has the Syed reader already.  Type 1 on insulin E10.65 2 Each 11   • insulin aspart (NOVOLOG FLEXPEN) 100 UNIT/ML injection PEN Inject 10 Units under the skin 3 times a day before  "meals. 15 mL 6   • vitamin D, Ergocalciferol, (DRISDOL) 1.25 MG (95760 UT) Cap capsule Take 1 capsule by mouth every 7 days. 12 capsule 3   • Blood Glucose Test Strips Test strips order: Test strips for [employer] Contour Next meter. Sig: use 3 times daily and prn ssx high or low sugar 100 Each 3   • insulin glargine (LANTUS SOLOSTAR) 100 UNIT/ML Solution Pen-injector injection Inject 30 Units under the skin every evening. 5 Each 6   • Insulin Pen Needle 32 G x 4 mm (BD PEN NEEDLE ADA U/F) USE 1 PEN NEEDLE WITH LANTUS ONCE DAILY *E11.65* 100 Each 11     No current [facility]-ordered facility-administered medications on file.       Past Medical, Social, and Family history reviewed and updated in EPIC     ------------------------------------------------------------------------------     PHYSICAL EXAM:   Vitals:    09/14/21 0747   BP: 106/62   Pulse: 92   Resp: 16   Temp: 36.9 °C (98.5 °F)   SpO2: 94%        Vitals:    09/14/21 0747   BP: 106/62   Weight: 56.7 kg (125 lb)   Height: 1.753 m (5' 9\")         Body mass index is 18.46 kg/m².    Constitutional: no acute distress  CV: heart RRR  Resp: normal effort, no wheezing or rales.  GI: abdomen soft, no obvious mass, no tenderness  Neuro: CN 2-12 grossly intact  Shoulders : no significant swelling redness or deformity.   ROM limited due to pain jes w shoulder abduction, flexion and extension  Pos impingement sign. Neg thumb down abduction test  R Knee : No signficant swelling redness or deformity.   ROM limited due to pain specifically w knee flexion/extension.   No signficant instability noted w varus/valgus maneuvers    -----------------------------------------------------------------------------    ASSESSMENT:   1. Chronic pain of right knee  DX-KNEE COMPLETE 4+ RIGHT    REFERRAL TO ORTHOPEDICS   2. Chronic pain of both shoulders  DX-SHOULDER 2+ LEFT    DX-SHOULDER 2+ RIGHT    REFERRAL TO ORTHOPEDICS   3. Type 2 diabetes mellitus without complication, with long-term current " use of insulin (HCC)  REFERRAL TO OPHTHALMOLOGY    Basic Metabolic Panel    Lipid Profile    TSH    MICROALBUMIN CREAT RATIO URINE    HEMOGLOBIN A1C    CANCELED: HEMOGLOBIN A1C   4. Anxiety  REFERRAL TO BEHAVIORAL HEALTH           MEDICAL DECISION MAKING: DISCUSSION / STATUS / PLAN:    Chronic right knee and bilateral shoulder pain.  X-rays requested.  Refer the patient to orthopedic specialist.    Diabetes mellitus  Current control is unclear.  Lab tests ordered today including chemistry and A1c.  Refer the patient to ophthalmology for retinal exam.  A1c goal of 7% discussed.  Pt's education: Advised the  benefits of blood glucose monitoring, potential hypoglycemia , medication mode of action/ possible side effects, the effects of exercise, potential acute and chronic conditions related to diabetes.   Advised lifestyle modification.  Stressed the importance of low sweet /low carb diet, high lean protein, and avoid unhealthy foods (¼ plate starch, ¼ plate protein, ½ plate non-starchy vegetables)  Exercise recommendations of approx 150 minutes/week as tolerated.    anxiety.  Refer the patient to behavioral health.    Health maintenance.  Refer the patient to GI for screening colonoscopy.  Patient declined Tdap hepatitis B vaccine     -If any problems should arise, patient was advised to contact our office or go to ER to be evaluated.    Please note that this dictation was created using voice recognition software. I have made every reasonable attempt to correct obvious errors, but it is possible there are errors of grammar and possibly content that I did not discover before finalizing the note.

## 2021-09-14 NOTE — ASSESSMENT & PLAN NOTE
Chronic condition.  Patient denies fever or chills.  He denies recent trauma or injury.  Pain is worse with weightbearing/walking.

## 2021-09-14 NOTE — ASSESSMENT & PLAN NOTE
This is a chronic condition for the patient presently followed by endocrinology service.  Patient is due for lab test.  Ordered today.  In addition a referral also submitted for the patient to see an ophthalmologist for retinal exam.

## 2021-09-15 ENCOUNTER — HOSPITAL ENCOUNTER (OUTPATIENT)
Dept: LAB | Facility: MEDICAL CENTER | Age: 56
End: 2021-09-15
Attending: INTERNAL MEDICINE
Payer: MEDICARE

## 2021-09-15 ENCOUNTER — TELEPHONE (OUTPATIENT)
Dept: MEDICAL GROUP | Facility: PHYSICIAN GROUP | Age: 56
End: 2021-09-15

## 2021-09-15 DIAGNOSIS — E78.5 DYSLIPIDEMIA: ICD-10-CM

## 2021-09-15 DIAGNOSIS — E11.9 TYPE 2 DIABETES MELLITUS WITHOUT COMPLICATION, WITH LONG-TERM CURRENT USE OF INSULIN (HCC): Chronic | ICD-10-CM

## 2021-09-15 DIAGNOSIS — Z79.4 TYPE 2 DIABETES MELLITUS WITHOUT COMPLICATION, WITH LONG-TERM CURRENT USE OF INSULIN (HCC): Chronic | ICD-10-CM

## 2021-09-15 DIAGNOSIS — Z79.4 TYPE 2 DIABETES MELLITUS WITHOUT COMPLICATION, WITH LONG-TERM CURRENT USE OF INSULIN (HCC): ICD-10-CM

## 2021-09-15 DIAGNOSIS — E11.9 TYPE 2 DIABETES MELLITUS WITHOUT COMPLICATION, WITH LONG-TERM CURRENT USE OF INSULIN (HCC): ICD-10-CM

## 2021-09-15 LAB
ANION GAP SERPL CALC-SCNC: 9 MMOL/L (ref 7–16)
BUN SERPL-MCNC: 13 MG/DL (ref 8–22)
CALCIUM SERPL-MCNC: 9.4 MG/DL (ref 8.5–10.5)
CHLORIDE SERPL-SCNC: 109 MMOL/L (ref 96–112)
CHOLEST SERPL-MCNC: 179 MG/DL (ref 100–199)
CO2 SERPL-SCNC: 22 MMOL/L (ref 20–33)
CREAT SERPL-MCNC: 0.82 MG/DL (ref 0.5–1.4)
CREAT UR-MCNC: 281.36 MG/DL
EST. AVERAGE GLUCOSE BLD GHB EST-MCNC: 206 MG/DL
FASTING STATUS PATIENT QL REPORTED: NORMAL
GLUCOSE SERPL-MCNC: 85 MG/DL (ref 65–99)
HBA1C MFR BLD: 8.8 % (ref 4–5.6)
HDLC SERPL-MCNC: 42 MG/DL
LDLC SERPL CALC-MCNC: 125 MG/DL
MICROALBUMIN UR-MCNC: 1.9 MG/DL
MICROALBUMIN/CREAT UR: 7 MG/G (ref 0–30)
POTASSIUM SERPL-SCNC: 4 MMOL/L (ref 3.6–5.5)
SODIUM SERPL-SCNC: 140 MMOL/L (ref 135–145)
TRIGL SERPL-MCNC: 61 MG/DL (ref 0–149)
TSH SERPL DL<=0.005 MIU/L-ACNC: 1.13 UIU/ML (ref 0.38–5.33)

## 2021-09-15 PROCEDURE — 36415 COLL VENOUS BLD VENIPUNCTURE: CPT

## 2021-09-15 PROCEDURE — 80061 LIPID PANEL: CPT

## 2021-09-15 PROCEDURE — 80048 BASIC METABOLIC PNL TOTAL CA: CPT

## 2021-09-15 PROCEDURE — 82043 UR ALBUMIN QUANTITATIVE: CPT

## 2021-09-15 PROCEDURE — 83036 HEMOGLOBIN GLYCOSYLATED A1C: CPT

## 2021-09-15 PROCEDURE — 82570 ASSAY OF URINE CREATININE: CPT

## 2021-09-15 PROCEDURE — 84443 ASSAY THYROID STIM HORMONE: CPT

## 2021-09-15 RX ORDER — ATORVASTATIN CALCIUM 20 MG/1
20 TABLET, FILM COATED ORAL DAILY
Qty: 30 TABLET | Refills: 6 | Status: SHIPPED | OUTPATIENT
Start: 2021-09-15 | End: 2021-09-17 | Stop reason: SDUPTHER

## 2021-09-15 NOTE — TELEPHONE ENCOUNTER
Spoke with pt, he states everything can't happen over night but it has gone down so is happy.  Pt will follow up with endocrinologist.  Pt will p/u Rx and let us know when he decides via cost on if he will be going to [facility] or [facility] for future Rx's.  No further questions at this time.  Pt thanked me for calling.

## 2021-09-15 NOTE — TELEPHONE ENCOUNTER
----- Message from Vincent Ding M.D. sent at 9/15/2021  3:28 PM PDT -----  Please notify patient about their results:    A1c [diabetes test]  has improved but still high at 8.8%    [previously 10.5 %   5 months ago].  Recommend patient to follow-up with endocrinologist  Please continue with low sweet/low carb diet regular exercise activities.    LDL cholesterol[bad cholesterol] is high at 125.  Recommend patient to start taking atorvastatin 20 mg p.o. nightly.  Rx sent to the pharmacy.  Recommend patient to follow-up with PCP approximately 4 months

## 2021-09-17 RX ORDER — ATORVASTATIN CALCIUM 20 MG/1
20 TABLET, FILM COATED ORAL DAILY
Qty: 100 TABLET | Refills: 1 | Status: SHIPPED | OUTPATIENT
Start: 2021-09-17 | End: 2022-02-14

## 2021-09-17 NOTE — TELEPHONE ENCOUNTER
Received request via: Pharmacy    Was the patient seen in the last year in this department? Yes    Does the patient have an active prescription (recently filled or refills available) for medication(s) requested? Yes. Insurance pays for 100 day supply.

## 2021-10-07 ENCOUNTER — APPOINTMENT (OUTPATIENT)
Dept: RADIOLOGY | Facility: MEDICAL CENTER | Age: 56
End: 2021-10-07
Attending: INTERNAL MEDICINE
Payer: MEDICARE

## 2022-02-03 DIAGNOSIS — Z79.4 TYPE 2 DIABETES MELLITUS WITHOUT COMPLICATION, WITH LONG-TERM CURRENT USE OF INSULIN (HCC): ICD-10-CM

## 2022-02-03 DIAGNOSIS — Z12.5 SCREENING FOR MALIGNANT NEOPLASM OF PROSTATE: ICD-10-CM

## 2022-02-03 DIAGNOSIS — E78.5 DYSLIPIDEMIA: ICD-10-CM

## 2022-02-03 DIAGNOSIS — E11.9 TYPE 2 DIABETES MELLITUS WITHOUT COMPLICATION, WITH LONG-TERM CURRENT USE OF INSULIN (HCC): ICD-10-CM

## 2022-02-07 RX ORDER — INSULIN GLARGINE 100 [IU]/ML
INJECTION, SOLUTION SUBCUTANEOUS
Qty: 15 ML | Refills: 0 | Status: SHIPPED | OUTPATIENT
Start: 2022-02-07 | End: 2022-06-07 | Stop reason: SDUPTHER

## 2022-02-14 ENCOUNTER — OFFICE VISIT (OUTPATIENT)
Dept: MEDICAL GROUP | Facility: PHYSICIAN GROUP | Age: 57
End: 2022-02-14
Payer: MEDICARE

## 2022-02-14 VITALS
RESPIRATION RATE: 18 BRPM | DIASTOLIC BLOOD PRESSURE: 60 MMHG | BODY MASS INDEX: 19.4 KG/M2 | WEIGHT: 131 LBS | HEIGHT: 69 IN | HEART RATE: 92 BPM | SYSTOLIC BLOOD PRESSURE: 120 MMHG | TEMPERATURE: 98.8 F

## 2022-02-14 DIAGNOSIS — Z12.11 COLON CANCER SCREENING: ICD-10-CM

## 2022-02-14 DIAGNOSIS — Z23 NEED FOR VACCINATION: ICD-10-CM

## 2022-02-14 DIAGNOSIS — E78.5 DYSLIPIDEMIA: ICD-10-CM

## 2022-02-14 DIAGNOSIS — E11.9 TYPE 2 DIABETES MELLITUS WITHOUT COMPLICATION, WITH LONG-TERM CURRENT USE OF INSULIN (HCC): Chronic | ICD-10-CM

## 2022-02-14 DIAGNOSIS — Z79.4 TYPE 2 DIABETES MELLITUS WITHOUT COMPLICATION, WITH LONG-TERM CURRENT USE OF INSULIN (HCC): Chronic | ICD-10-CM

## 2022-02-14 DIAGNOSIS — E13.9 OTHER SPECIFIED DIABETES MELLITUS WITHOUT COMPLICATION, WITH LONG-TERM CURRENT USE OF INSULIN (HCC): ICD-10-CM

## 2022-02-14 DIAGNOSIS — K58.9 IRRITABLE BOWEL SYNDROME, UNSPECIFIED TYPE: Chronic | ICD-10-CM

## 2022-02-14 DIAGNOSIS — Z12.5 SCREENING FOR MALIGNANT NEOPLASM OF PROSTATE: ICD-10-CM

## 2022-02-14 DIAGNOSIS — Z79.4 OTHER SPECIFIED DIABETES MELLITUS WITHOUT COMPLICATION, WITH LONG-TERM CURRENT USE OF INSULIN (HCC): ICD-10-CM

## 2022-02-14 DIAGNOSIS — Z72.0 TOBACCO ABUSE: Chronic | ICD-10-CM

## 2022-02-14 PROBLEM — K52.9 CHRONIC DIARRHEA: Status: ACTIVE | Noted: 2022-02-14

## 2022-02-14 PROCEDURE — 99214 OFFICE O/P EST MOD 30 MIN: CPT | Performed by: INTERNAL MEDICINE

## 2022-02-14 ASSESSMENT — PATIENT HEALTH QUESTIONNAIRE - PHQ9: CLINICAL INTERPRETATION OF PHQ2 SCORE: 0

## 2022-02-14 NOTE — ASSESSMENT & PLAN NOTE
This is a chronic condition.  The patient reported that he has stopped taking atorvastatin.  Lipid panel ordered for follow-up.

## 2022-02-14 NOTE — PROGRESS NOTES
PRIMARY CARE CLINIC VISIT  Chief Complaint   Patient presents with   • Follow-Up     Follow-up diabetes    History of Present Illness     Diabetes mellitus (HCC)  This is a chronic condition.  The patient is currently on Lantus in the evening and premeal NovoLog.  Patient denies significant hypoglycemic symptoms.  The patient is due for lab test.    Tobacco abuse  Strongly advised the patient is important to quit smoking.    IBS (irritable bowel syndrome)  Chronic condition.  Patient reported frequent diarrhea.  He denies fever chills or GI bleeding.  The patient requests a referral to see GI specialist.    Dyslipidemia  This is a chronic condition.  The patient reported that he has stopped taking atorvastatin.  Lipid panel ordered for follow-up.      Current Outpatient Medications on File Prior to Visit   Medication Sig Dispense Refill   • LANTUS SOLOSTAR 100 UNIT/ML Solution Pen-injector injection INJECT 30 UNITS SUBCUTANEOUSLY IN THE EVENING 15 mL 0   • CONTOUR NEXT TEST strip USE STRIP TO CHECK GLUCOSE THREE TIMES DAILY AND AS NEEDED FOR SIGNS AND SYMPTOMS OR LOW SUGAR     • Insulin Pen Needle 32 G x 4 mm 1 Each 4 Times a Day,Before Meals and at Bedtime. 400 Each 3   • Continuous Blood Gluc Sensor (FREESTYLE SYED 2 SENSOR) Misc 1 Each every 14 days. He has the Syed reader already.  Type 1 on insulin E10.65 2 Each 11   • insulin aspart (NOVOLOG FLEXPEN) 100 UNIT/ML injection PEN Inject 10 Units under the skin 3 times a day before meals. 15 mL 6   • Blood Glucose Test Strips Test strips order: Test strips for Digital Message Display Contour Next meter. Sig: use 3 times daily and prn ssx high or low sugar 100 Each 3   • Insulin Pen Needle 32 G x 4 mm (BD PEN NEEDLE ADA U/F) USE 1 PEN NEEDLE WITH LANTUS ONCE DAILY *E11.65* 100 Each 11     No current facility-administered medications on file prior to visit.        Allergies: Patient has no known allergies.    ROS  As per HPI above. All other systems reviewed and negative.      Past  "Medical, Social, and Family history reviewed and updated in EPIC     Objective     Resp 18   Ht 1.753 m (5' 9\")   Wt 59.4 kg (131 lb)    Body mass index is 19.35 kg/m².    General: alert and oriented  Cardiovascular: regular rate and rhythm  Pulmonary: lungs : no wheezing   Gastrointestinal: BS present. No obvious mass noted          Assessment and Plan     1. Diabetes mellitus without complication, with long-term current use of insulin (HCC)  This is a chronic condition.  Current control is unclear.  Lab tests ordered for follow-up.    A1c goal of 7% discussed.  Pt's education:   -Advised the  benefits of blood glucose monitoring, potential hypoglycemia , medication mode of action/ possible side effects, the effects of exercise, potential acute and chronic conditions related to diabetes.   -Discussed with pt regarding changing diet and making better choices to help  blood sugar.  -Also encouraged pt to continue with regular exercises/walking.        - Referral to Ophthalmology  - HEMOGLOBIN A1C; Future  - ALANINE AMINO-TRANS; Future  - Basic Metabolic Panel; Future  - Lipid Profile; Future  - TSH; Future  - MICROALBUMIN CREAT RATIO URINE; Future    2. Irritable bowel syndrome, unspecified type  Chronic condition.  - Referral to Gastroenterology    3. Screening for malignant neoplasm of prostate    - PROSTATE SPECIFIC AG SCREENING; Future    4. Tobacco abuse  Advised the patient is important to quit smoking.  Patient refused.    5. Dyslipidemia  Lipid panel ordered.  Recommend low-fat low-cholesterol diet                Return if symptoms worsen or fail to improve             Please note that this dictation was created using voice recognition software. I have made every reasonable attempt to correct obvious errors but there may be errors of grammar and content that I may have overlooked prior to finalization of this note.      Vincent Ding MD  Internal Medicine  [facility]  "

## 2022-02-14 NOTE — ASSESSMENT & PLAN NOTE
This is a chronic condition.  The patient is currently on Lantus in the evening and premeal NovoLog.  Patient denies significant hypoglycemic symptoms.  The patient is due for lab test.

## 2022-02-14 NOTE — ASSESSMENT & PLAN NOTE
Chronic condition.  Patient reported frequent diarrhea.  He denies fever chills or GI bleeding.  The patient requests a referral to see GI specialist.

## 2022-02-15 ENCOUNTER — HOSPITAL ENCOUNTER (OUTPATIENT)
Dept: LAB | Facility: MEDICAL CENTER | Age: 57
End: 2022-02-15
Attending: INTERNAL MEDICINE
Payer: MEDICARE

## 2022-02-15 DIAGNOSIS — E11.9 TYPE 2 DIABETES MELLITUS WITHOUT COMPLICATION, WITH LONG-TERM CURRENT USE OF INSULIN (HCC): Chronic | ICD-10-CM

## 2022-02-15 DIAGNOSIS — Z12.5 SCREENING FOR MALIGNANT NEOPLASM OF PROSTATE: ICD-10-CM

## 2022-02-15 DIAGNOSIS — Z79.4 TYPE 2 DIABETES MELLITUS WITHOUT COMPLICATION, WITH LONG-TERM CURRENT USE OF INSULIN (HCC): Chronic | ICD-10-CM

## 2022-02-15 LAB
ALT SERPL-CCNC: 11 U/L (ref 2–50)
ANION GAP SERPL CALC-SCNC: 8 MMOL/L (ref 7–16)
BUN SERPL-MCNC: 10 MG/DL (ref 8–22)
CALCIUM SERPL-MCNC: 9 MG/DL (ref 8.5–10.5)
CHLORIDE SERPL-SCNC: 107 MMOL/L (ref 96–112)
CHOLEST SERPL-MCNC: 175 MG/DL (ref 100–199)
CO2 SERPL-SCNC: 24 MMOL/L (ref 20–33)
CREAT SERPL-MCNC: 0.89 MG/DL (ref 0.5–1.4)
CREAT UR-MCNC: 117.55 MG/DL
EST. AVERAGE GLUCOSE BLD GHB EST-MCNC: 189 MG/DL
FASTING STATUS PATIENT QL REPORTED: NORMAL
GLUCOSE SERPL-MCNC: 166 MG/DL (ref 65–99)
HBA1C MFR BLD: 8.2 % (ref 4–5.6)
HDLC SERPL-MCNC: 41 MG/DL
LDLC SERPL CALC-MCNC: 117 MG/DL
MICROALBUMIN UR-MCNC: <1.2 MG/DL
MICROALBUMIN/CREAT UR: NORMAL MG/G (ref 0–30)
POTASSIUM SERPL-SCNC: 4 MMOL/L (ref 3.6–5.5)
PSA SERPL-MCNC: 0.61 NG/ML (ref 0–4)
SODIUM SERPL-SCNC: 139 MMOL/L (ref 135–145)
TRIGL SERPL-MCNC: 86 MG/DL (ref 0–149)
TSH SERPL DL<=0.005 MIU/L-ACNC: 2.09 UIU/ML (ref 0.38–5.33)

## 2022-02-15 PROCEDURE — 84443 ASSAY THYROID STIM HORMONE: CPT

## 2022-02-15 PROCEDURE — 83036 HEMOGLOBIN GLYCOSYLATED A1C: CPT

## 2022-02-15 PROCEDURE — 36415 COLL VENOUS BLD VENIPUNCTURE: CPT

## 2022-02-15 PROCEDURE — 84460 ALANINE AMINO (ALT) (SGPT): CPT

## 2022-02-15 PROCEDURE — 82570 ASSAY OF URINE CREATININE: CPT

## 2022-02-15 PROCEDURE — 80048 BASIC METABOLIC PNL TOTAL CA: CPT

## 2022-02-15 PROCEDURE — 82043 UR ALBUMIN QUANTITATIVE: CPT

## 2022-02-15 PROCEDURE — 84153 ASSAY OF PSA TOTAL: CPT

## 2022-02-15 PROCEDURE — 80061 LIPID PANEL: CPT

## 2022-02-15 RX ORDER — ATORVASTATIN CALCIUM 20 MG/1
20 TABLET, FILM COATED ORAL DAILY
Qty: 90 TABLET | Refills: 3 | Status: SHIPPED | OUTPATIENT
Start: 2022-02-15 | End: 2022-05-11

## 2022-02-18 ENCOUNTER — TELEPHONE (OUTPATIENT)
Dept: MEDICAL GROUP | Facility: PHYSICIAN GROUP | Age: 57
End: 2022-02-18
Payer: MEDICARE

## 2022-02-18 NOTE — TELEPHONE ENCOUNTER
----- Message from Vincent Ding M.D. sent at 2/15/2022  5:32 PM PST -----  Pls call pt. Labs back    A1c [diabetes test]    8.2%   . Improved when compared to previous result .  However, it is still suboptimal.  Goal for a1c 7% or lower  Pls confirm if pt is now using lantus 30u daily? If this is correct >> Rec pt to increase the dose of lantus to 32units daily  Please continue with low sweet  low carb diet ,  continue to exercise / walking regularly.    Cholesterol :      175                   [Desirable range = below 200]  LDL [bad cholesterol]:    117 high      [Desirable range = below 100]  Rec pt to start taking cholesterol Atorvastatin 20mg at bed time  Cont with low fat low chol diet.    Rec pt to fJohnu samreen pcp in approx 4mo to repeat A1c and cholesterol panel.

## 2022-03-24 DIAGNOSIS — E10.65 UNCONTROLLED TYPE 1 DIABETES MELLITUS WITH HYPERGLYCEMIA (HCC): ICD-10-CM

## 2022-05-11 ENCOUNTER — OFFICE VISIT (OUTPATIENT)
Dept: MEDICAL GROUP | Facility: PHYSICIAN GROUP | Age: 57
End: 2022-05-11
Payer: MEDICARE

## 2022-05-11 VITALS
OXYGEN SATURATION: 94 % | DIASTOLIC BLOOD PRESSURE: 64 MMHG | HEART RATE: 78 BPM | BODY MASS INDEX: 19.4 KG/M2 | HEIGHT: 69 IN | TEMPERATURE: 98.6 F | WEIGHT: 131 LBS | SYSTOLIC BLOOD PRESSURE: 98 MMHG | RESPIRATION RATE: 16 BRPM

## 2022-05-11 DIAGNOSIS — E78.5 DYSLIPIDEMIA: ICD-10-CM

## 2022-05-11 DIAGNOSIS — F31.81 BIPOLAR 2 DISORDER, MAJOR DEPRESSIVE EPISODE (HCC): ICD-10-CM

## 2022-05-11 DIAGNOSIS — E10.65 UNCONTROLLED TYPE 1 DIABETES MELLITUS WITH HYPERGLYCEMIA (HCC): ICD-10-CM

## 2022-05-11 DIAGNOSIS — E10.65 TYPE 1 DIABETES MELLITUS WITH HYPERGLYCEMIA (HCC): ICD-10-CM

## 2022-05-11 DIAGNOSIS — Z72.0 TOBACCO ABUSE: Chronic | ICD-10-CM

## 2022-05-11 PROBLEM — E10.9 TYPE 1 DIABETES MELLITUS (HCC): Status: ACTIVE | Noted: 2020-12-04

## 2022-05-11 PROCEDURE — 99214 OFFICE O/P EST MOD 30 MIN: CPT | Mod: 25 | Performed by: INTERNAL MEDICINE

## 2022-05-11 PROCEDURE — 99406 BEHAV CHNG SMOKING 3-10 MIN: CPT | Performed by: INTERNAL MEDICINE

## 2022-05-11 NOTE — ASSESSMENT & PLAN NOTE
This is a chronic condition.  The patient presently followed by endocrinology service.  The most recent A1c show A1c 8.2% which is an improvement compared to previous results.  Patient is currently on insulin treatment.  The patient denies significant hypoglycemic symptoms.

## 2022-05-11 NOTE — ASSESSMENT & PLAN NOTE
Chronic condition.  Patient reported that he stopped taking atorvastatin unclear reason.  Lab tests ordered.

## 2022-05-11 NOTE — ASSESSMENT & PLAN NOTE
This is a chronic condition.  The patient requests update referral to behavioral health.  The patient denies SI.  Patient currently not interested in taking a medication.

## 2022-05-11 NOTE — ASSESSMENT & PLAN NOTE
Chronic condition.  Strongly advised the patient is important to quit smoking.    I spent 5 minutes of face to face counseling pt regarding smoking cessation.   Discussed w pt the potential risks/hazards of tobacco.    Strongly advised pt to quit.  We discussed pharmacotherapy options for quiting including nicotine replacement.

## 2022-05-11 NOTE — PROGRESS NOTES
PRIMARY CARE CLINIC VISIT  Chief Complaint   Patient presents with   • Follow-Up     Follow-up diabetes  Requests refill for freestyle reynaldo sensor    History of Present Illness     Diabetes mellitus type I (HCC)  This is a chronic condition.  The patient presently followed by endocrinology service.  The most recent A1c show A1c 8.2% which is an improvement compared to previous results.  Patient is currently on insulin treatment.  The patient denies significant hypoglycemic symptoms.    Bipolar 2 disorder, major depressive episode (HCC)  This is a chronic condition.  The patient requests update referral to behavioral health.  The patient denies SI.  Patient currently not interested in taking a medication.    Tobacco abuse  Chronic condition.  Strongly advised the patient is important to quit smoking.        Dyslipidemia  Chronic condition.  Patient reported that he stopped taking atorvastatin unclear reason.  Lab tests ordered.      Current Outpatient Medications on File Prior to Visit   Medication Sig Dispense Refill   • LANTUS SOLOSTAR 100 UNIT/ML Solution Pen-injector injection INJECT 30 UNITS SUBCUTANEOUSLY IN THE EVENING 15 mL 0   • CONTOUR NEXT TEST strip USE STRIP TO CHECK GLUCOSE THREE TIMES DAILY AND AS NEEDED FOR SIGNS AND SYMPTOMS OR LOW SUGAR     • Insulin Pen Needle 32 G x 4 mm 1 Each 4 Times a Day,Before Meals and at Bedtime. 400 Each 3   • insulin aspart (NOVOLOG FLEXPEN) 100 UNIT/ML injection PEN Inject 10 Units under the skin 3 times a day before meals. 15 mL 6   • Blood Glucose Test Strips Test strips order: Test strips for Bernard Contour Next meter. Sig: use 3 times daily and prn ssx high or low sugar 100 Each 3   • Insulin Pen Needle 32 G x 4 mm (BD PEN NEEDLE ADA U/F) USE 1 PEN NEEDLE WITH LANTUS ONCE DAILY *E11.65* 100 Each 11     No current facility-administered medications on file prior to visit.        Allergies: Patient has no known allergies.    ROS  As per HPI above. All other systems  "reviewed and negative.      Past Medical, Social, and Family history reviewed and updated in EPIC     Objective     BP (!) 98/64 (BP Location: Right arm, Patient Position: Sitting, BP Cuff Size: Adult)   Pulse 78   Temp 37 °C (98.6 °F) (Temporal)   Resp 16   Ht 1.753 m (5' 9\")   Wt 59.4 kg (131 lb)   SpO2 94%    Body mass index is 19.35 kg/m².    General: alert and oriented  Cardiovascular: regular rate and rhythm  Pulmonary: lungs : no wheezing   Gastrointestinal: BS present. No obvious mass noted    Monofilament testing with a 10 gram force: sensation intact: decreased bilaterally  Visual Inspection: Feet without maceration, ulcers, fissures.  Pedal pulses: decreased bilaterally      Assessment and Plan     1. Uncontrolled type 1 diabetes mellitus with hyperglycemia (HCC)  - Referral to Endocrinology  - [place]FREESTYLE [place]OR[place] [facility] [place] FOR CONTINUOUS BLOOD SUGAR MONITORING. CHANGE EVERY 14 DAYS AS DIRECTED  Dispense: 2 Each; Refill: 11  - Referral to Ophthalmology  - HEMOGLOBIN A1C; Future  - Basic Metabolic Panel; Future  Microalbumin also requested.    A1c goal of 7% discussed.  Pt's education:   -Advised the  benefits of blood glucose monitoring, potential hypoglycemia , medication mode of action/ possible side effects, the effects of exercise, potential acute and chronic conditions related to diabetes.   -Discussed with pt regarding changing diet and making better choices to help  blood sugar.  -Also encouraged pt to continue with regular exercises/walking.        2. Bipolar 2 disorder, major depressive episode (HCC)  - Referral to Behavioral Health    3. Dyslipidemia  - ALANINE AMINO-TRANS; Future  - Lipid Profile; Future  Recommend low-fat low-cholesterol diet.  4. Tobacco abuse    I spent 5 minutes of face to face counseling pt regarding smoking cessation.   Discussed w pt the potential risks/hazards of tobacco.    Strongly advised pt to quit.  We discussed " pharmacotherapy options for quiting including nicotine replacement. Pt declined    reCommend follow-up approximately 3 to 4 months.              Healthcare maintenance     Health Maintenance Due   Topic Date Due   • IMM PNEUMOCOCCAL VACCINE: 0-64 Years (2 - PCV) 10/17/2018   • RETINAL SCREENING  02/07/2019   • COLORECTAL CANCER SCREENING  04/11/2019                Please note that this dictation was created using voice recognition software. I have made every reasonable attempt to correct obvious errors but there may be errors of grammar and content that I may have overlooked prior to finalization of this note.      Vincent Ding MD  Internal Medicine  [facility]

## 2022-05-16 ENCOUNTER — TELEPHONE (OUTPATIENT)
Dept: ENDOCRINOLOGY | Facility: MEDICAL CENTER | Age: 57
End: 2022-05-16

## 2022-05-16 NOTE — TELEPHONE ENCOUNTER
Patient was seeing Dafne and called to schedule a N2U with a new provider in out office. I offered the first available that I had with Roni in October and November with Dr VIRGEN but patient did not want to wait that long he states he needs to get in ASAP because he needs to have surgery. He was upset and said he needs to get in because he was diagnosed wrong until he came here to see Dafne. Patient was not really making sense so I informed him I will have our  call him. Please contact patient at [phone].

## 2022-05-19 ENCOUNTER — TELEPHONE (OUTPATIENT)
Dept: MEDICAL GROUP | Facility: PHYSICIAN GROUP | Age: 57
End: 2022-05-19
Payer: MEDICARE

## 2022-05-19 NOTE — TELEPHONE ENCOUNTER
FINAL PRIOR AUTHORIZATION STATUS:    1.  Name of Medication & Dose: Freestlymarco Peterson 2 sensor     2. Prior Auth Status: Approved through 05/18/2023     3. Action Taken: Pharmacy Notified: yes Patient Notified: yes

## 2022-05-24 ENCOUNTER — HOSPITAL ENCOUNTER (OUTPATIENT)
Dept: LAB | Facility: MEDICAL CENTER | Age: 57
End: 2022-05-24
Attending: INTERNAL MEDICINE
Payer: MEDICARE

## 2022-05-24 DIAGNOSIS — E10.65 UNCONTROLLED TYPE 1 DIABETES MELLITUS WITH HYPERGLYCEMIA (HCC): ICD-10-CM

## 2022-05-24 DIAGNOSIS — E78.5 DYSLIPIDEMIA: ICD-10-CM

## 2022-05-24 DIAGNOSIS — E10.65 TYPE 1 DIABETES MELLITUS WITH HYPERGLYCEMIA (HCC): ICD-10-CM

## 2022-05-24 LAB
ALT SERPL-CCNC: 13 U/L (ref 2–50)
ANION GAP SERPL CALC-SCNC: 10 MMOL/L (ref 7–16)
BUN SERPL-MCNC: 17 MG/DL (ref 8–22)
CALCIUM SERPL-MCNC: 9.4 MG/DL (ref 8.5–10.5)
CHLORIDE SERPL-SCNC: 109 MMOL/L (ref 96–112)
CHOLEST SERPL-MCNC: 161 MG/DL (ref 100–199)
CO2 SERPL-SCNC: 21 MMOL/L (ref 20–33)
CREAT SERPL-MCNC: 1.03 MG/DL (ref 0.5–1.4)
EST. AVERAGE GLUCOSE BLD GHB EST-MCNC: 180 MG/DL
FASTING STATUS PATIENT QL REPORTED: NORMAL
GFR SERPLBLD CREATININE-BSD FMLA CKD-EPI: 84 ML/MIN/1.73 M 2
GLUCOSE SERPL-MCNC: 97 MG/DL (ref 65–99)
HBA1C MFR BLD: 7.9 % (ref 4–5.6)
HDLC SERPL-MCNC: 37 MG/DL
LDLC SERPL CALC-MCNC: 109 MG/DL
POTASSIUM SERPL-SCNC: 4.2 MMOL/L (ref 3.6–5.5)
SODIUM SERPL-SCNC: 140 MMOL/L (ref 135–145)
TRIGL SERPL-MCNC: 73 MG/DL (ref 0–149)

## 2022-05-24 PROCEDURE — 80061 LIPID PANEL: CPT

## 2022-05-24 PROCEDURE — 83036 HEMOGLOBIN GLYCOSYLATED A1C: CPT

## 2022-05-24 PROCEDURE — 84460 ALANINE AMINO (ALT) (SGPT): CPT

## 2022-05-24 PROCEDURE — 80048 BASIC METABOLIC PNL TOTAL CA: CPT

## 2022-05-24 PROCEDURE — 36415 COLL VENOUS BLD VENIPUNCTURE: CPT

## 2022-05-25 ENCOUNTER — TELEPHONE (OUTPATIENT)
Dept: MEDICAL GROUP | Facility: PHYSICIAN GROUP | Age: 57
End: 2022-05-25
Payer: MEDICARE

## 2022-05-25 NOTE — TELEPHONE ENCOUNTER
----- Message from Vincent Ding M.D. sent at 5/24/2022  5:07 PM PDT -----  Pls notify pt. Labs  back:    A1c [diabetes test]   7.9 %. Improved when compared to previous result.  Goal for A1c 7-7.5%      Cholesterol :     161   normal  LDL [bad cholesterol]:    109 improved when compared to previous result.      [Desirable range = below 100]    Electrolytes, kidney function test, liver enzyme: Are within normal limits.    Please follow-up with Dr. Ding in  6 months

## 2022-06-07 DIAGNOSIS — E10.65 UNCONTROLLED TYPE 1 DIABETES MELLITUS WITH HYPERGLYCEMIA (HCC): ICD-10-CM

## 2022-06-07 RX ORDER — INSULIN GLARGINE 100 [IU]/ML
INJECTION, SOLUTION SUBCUTANEOUS
Qty: 3 EACH | Refills: 6 | Status: SHIPPED | OUTPATIENT
Start: 2022-06-07 | End: 2022-06-07 | Stop reason: SDUPTHER

## 2022-06-07 RX ORDER — INSULIN ASPART 100 [IU]/ML
10 INJECTION, SOLUTION INTRAVENOUS; SUBCUTANEOUS
Qty: 15 ML | Refills: 6 | Status: SHIPPED | OUTPATIENT
Start: 2022-06-07 | End: 2023-07-13

## 2022-06-07 RX ORDER — INSULIN GLARGINE 100 [IU]/ML
INJECTION, SOLUTION SUBCUTANEOUS
Qty: 3 EACH | Refills: 6 | Status: SHIPPED | OUTPATIENT
Start: 2022-06-07 | End: 2023-01-18 | Stop reason: SDUPTHER

## 2022-08-31 DIAGNOSIS — E10.65 UNCONTROLLED TYPE 1 DIABETES MELLITUS WITH HYPERGLYCEMIA (HCC): ICD-10-CM

## 2022-08-31 NOTE — TELEPHONE ENCOUNTER
Received request via: Pharmacy    Was the patient seen in the last year in this department? No - has an appointment with Roni    Does the patient have an active prescription (recently filled or refills available) for medication(s) requested? No

## 2022-09-01 RX ORDER — PEN NEEDLE, DIABETIC 32GX 5/32"
NEEDLE, DISPOSABLE MISCELLANEOUS
Qty: 100 EACH | Refills: 11 | Status: SHIPPED | OUTPATIENT
Start: 2022-09-01 | End: 2022-09-15 | Stop reason: SDUPTHER

## 2022-09-15 ENCOUNTER — OFFICE VISIT (OUTPATIENT)
Dept: ENDOCRINOLOGY | Facility: MEDICAL CENTER | Age: 57
End: 2022-09-15
Payer: MEDICARE

## 2022-09-15 VITALS
BODY MASS INDEX: 18.16 KG/M2 | HEART RATE: 96 BPM | DIASTOLIC BLOOD PRESSURE: 64 MMHG | OXYGEN SATURATION: 97 % | WEIGHT: 122.6 LBS | SYSTOLIC BLOOD PRESSURE: 102 MMHG | HEIGHT: 69 IN

## 2022-09-15 DIAGNOSIS — R80.9 MICROALBUMINURIA: ICD-10-CM

## 2022-09-15 DIAGNOSIS — E10.65 UNCONTROLLED TYPE 1 DIABETES MELLITUS WITH HYPERGLYCEMIA (HCC): ICD-10-CM

## 2022-09-15 DIAGNOSIS — E78.49 OTHER HYPERLIPIDEMIA: ICD-10-CM

## 2022-09-15 DIAGNOSIS — E55.9 VITAMIN D DEFICIENCY: ICD-10-CM

## 2022-09-15 LAB
HBA1C MFR BLD: 8 % (ref 0–5.6)
INT CON NEG: ABNORMAL
INT CON POS: ABNORMAL

## 2022-09-15 PROCEDURE — 99212 OFFICE O/P EST SF 10 MIN: CPT

## 2022-09-15 PROCEDURE — 83036 HEMOGLOBIN GLYCOSYLATED A1C: CPT

## 2022-09-15 PROCEDURE — 99214 OFFICE O/P EST MOD 30 MIN: CPT

## 2022-09-15 RX ORDER — ATORVASTATIN CALCIUM 10 MG/1
10 TABLET, FILM COATED ORAL NIGHTLY
Qty: 90 TABLET | Refills: 2 | Status: SHIPPED | OUTPATIENT
Start: 2022-09-15 | End: 2023-08-10 | Stop reason: SDUPTHER

## 2022-09-15 RX ORDER — PEN NEEDLE, DIABETIC 32GX 5/32"
NEEDLE, DISPOSABLE MISCELLANEOUS
Qty: 300 EACH | Refills: 11 | Status: SHIPPED | OUTPATIENT
Start: 2022-09-15 | End: 2023-08-10 | Stop reason: SDUPTHER

## 2022-09-15 RX ORDER — LISINOPRIL 2.5 MG/1
2.5 TABLET ORAL DAILY
Qty: 90 TABLET | Refills: 2 | Status: SHIPPED | OUTPATIENT
Start: 2022-09-15 | End: 2023-08-10 | Stop reason: SDUPTHER

## 2022-09-15 NOTE — PROGRESS NOTES
CHIEF COMPLAINT: Patient is here for follow up of confirmed Type 1 Diabetes Mellitus.    HPI:   Celso Rose is a very pleasant 56 y.o. male         1.  Uncontrolled Type 1 diabetes mellitus with hyperglycemia:  Diagnosed 22 years ago     Ref. Range 4/16/2021 07:41   C-Peptide Latest Ref Range: 0.8 - 3.5 ng/mL 0.2 (L)      Latest Reference Range & Units 2/15/22 07:38   TSH 0.380 - 5.330 uIU/mL 2.090     Current diabetes regimen:  Novolog 1-3 units per meal  Lantus 12-15 units at night, takes 12 if BG <150    POC a1c on 09/14/2022 at 8%  Serum A1c 04/13/2021 10.5%    patient utilizing freestyle reynaldo 2 CGM device.  Device downloaded and reviewed with patient, average blood glucose of 124, time in range 56%  Fasting BG 79-200s.    Patient reports hypoglycemic events-occasionally before bedtime.       Diabetes Complications   Retinopathy: No known retinopathy.    Last eye exam: appt for next month, with [facility].   Reports starting Macular Degeneration.   Neuropathy: Denies numbness and tingling in BUE and BLE.    Exercise: Minimal.  Diet: Fair.    2.  Microalbuminuria:  Patient currently not taking lisinopril 2.5 mg daily.  BP currently 102/64     Latest Reference Range & Units 2/15/22 07:38   Micro Alb Creat Ratio 0 - 30 mg/g see below   Creatinine, Urine mg/dL 117.55   Microalbumin, Urine Random mg/dL <1.2      Ref. Range 12/8/2020 08:52   Micro Alb Creat Ratio Latest Ref Range: 0 - 30 mg/g 8   Creatinine, Urine Latest Units: mg/dL 211.88   Microalbumin, Urine Random Latest Units: mg/dL 1.6     3.  Other hyperlipidemia:  Currently not taking Lipitor 10 mg daily     Latest Reference Range & Units 5/24/22 07:20   Cholesterol,Tot 100 - 199 mg/dL 161   Triglycerides 0 - 149 mg/dL 73   HDL >=40 mg/dL 37 !   LDL <100 mg/dL 109 (H)     4.  Vitamin D deficiency:  Vitamin D deficiency, currently taking vitamin D 50,000 IU daily.     Ref. Range 4/16/2021 07:41   25-Hydroxy   Vitamin D 25 Latest Ref Range: 30  - 80 ng/mL 13 (L)         Patient's medications, allergies, and social histories were reviewed and updated as appropriate.      ROS:     CONS:     No fever, no chills, no weight loss, no fatigue   EYES:      No diplopia, no blurry vision, no redness of eyes, no swelling of eyelids   ENT:    No hearing loss, No ear pain, No sore throat, no dysphagia, no neck swelling   CV:     No chest pain, no palpitations, no claudication, no orthopnea, no PND   PULM:    No SOB, no cough, no hemoptysis, no wheezing    GI:   No nausea, no vomiting, no diarrhea, no constipation, no bloody stools   :  Passing urine well, no dysuria, no hematuria   ENDO:   No polyuria, no polydipsia, no heat intolerance, no cold intolerance   NEURO: No headaches, no dizziness, no convulsions, no tremors   MUSC:  No joint swellings, no arthralgias, no myalgias, no weakness   SKIN:   No rash, no ulcers, no dry skin   PSYCH:   No depression, no anxiety, no difficulty sleeping       Past Medical History:  Patient Active Problem List    Diagnosis Date Noted    Chronic diarrhea 02/14/2022    Dyslipidemia 09/15/2021    Chronic pain of right knee 09/14/2021    Bilateral shoulder pain 09/14/2021    Anxiety 09/14/2021    IBS (irritable bowel syndrome) 12/04/2020    Diabetes mellitus type I ([facility]) 12/04/2020    Cannabis dependence, continuous ([facility]) 01/30/2019    Bipolar 2 disorder, major depressive episode ([facility]) 07/19/2018    Tobacco abuse 07/18/2018       Past Surgical History:  Past Surgical History:   Procedure Laterality Date    KNEE ARTHROSCOPY      SHOULDER ARTHROSCOPY          Allergies:  Patient has no known allergies.     Current Medications:    Current Outpatient Medications:     Insulin Pen Needle 32 G x 4 mm (BD PEN NEEDLE ADA U/F), USE 1 PEN NEEDLE WITH LANTUS ONCE DAILY *E11.65*, Disp: 100 Each, Rfl: 11    insulin aspart (NOVOLOG FLEXPEN) 100 UNIT/ML injection PEN, Inject 10 Units under the skin 3 times a day before meals., Disp: 15 mL, Rfl: 6     insulin glargine (LANTUS SOLOSTAR) 100 UNIT/ML Solution Pen-injector injection, INJECT 30 UNITS SUBCUTANEOUSLY IN THE EVENING, Disp: 3 Each, Rfl: 6    Continuous Blood Gluc Sensor (FREESTYLE SYED 2 SENSOR) [facility] [place], USE FOR CONTINUOUS BLOOD SUGAR MONITORING. CHANGE EVERY 14 DAYS AS DIRECTED, Disp: 2 Each, Rfl: 11    CONTOUR NEXT TEST strip, USE STRIP TO CHECK GLUCOSE THREE TIMES DAILY AND AS NEEDED FOR SIGNS AND SYMPTOMS OR LOW SUGAR, Disp: , Rfl:     Insulin Pen Needle 32 G x 4 mm, 1 Each 4 Times a Day,Before Meals and at Bedtime., Disp: 400 Each, Rfl: 3    Blood Glucose Test Strips, Test strips order: Test strips for Bernard Contour Next meter. Sig: use 3 times daily and prn ssx high or low sugar, Disp: 100 Each, Rfl: 3    Social History:  Social History     Socioeconomic History    Marital status: Single     Spouse name: Not on file    Number of children: Not on file    Years of education: Not on file    Highest education level: Not on file   Occupational History    Not on file   Tobacco Use    Smoking status: Smoker, Current Status Unknown     Packs/day: 1.00     Years: 1.00     Pack years: 1.00     Types: Cigarettes     Last attempt to quit: 2021     Years since quittin.4    Smokeless tobacco: Never    Tobacco comments:     started smoking at age 14 quit for a couple months then started    Vaping Use    Vaping Use: Never used   Substance and Sexual Activity    Alcohol use: No    Drug use: Yes     Frequency: 7.0 times per week     Types: Marijuana    Sexual activity: Yes     Partners: Female     Birth control/protection: Condom   Other Topics Concern    Not on file   Social History Narrative    Not on file     Social Determinants of Health     Financial Resource Strain: Not on file   Food Insecurity: Not on file   Transportation Needs: Not on file   Physical Activity: Not on file   Stress: Not on file   Social Connections: Not on file   Intimate Partner Violence: Not on file   Housing Stability: Not on file  "       Family History:   Family History   Problem Relation Age of Onset    Heart Disease Mother     Diabetes Father     Hypertension Sister     Alcohol/Drug Sister        PHYSICAL EXAM:   Vital signs: /64 (BP Location: Left arm, Patient Position: Sitting, BP Cuff Size: Adult long)   Pulse 96   Ht 1.753 m (5' 9\")   Wt 55.6 kg (122 lb 9.6 oz)   SpO2 97%   BMI 18.10 kg/m²   GENERAL: Well-developed, well-nourished  in no apparent distress.   EYE: No ocular and eyelid asymmetry, Anicteric sclerae,  PERRL, No exophthalmos or lidlag  HENT: Hearing grossly intact, Normocephalic, atraumatic.   NECK: Supple. Trachea midline.   CARDIOVASCULAR: Regular rate and rhythm. No murmurs, rubs, or gallops.   LUNGS: Clear to auscultation bilaterally   EXTREMITIES: No clubbing, cyanosis, or edema.   NEUROLOGICAL: Cranial nerves II-XII are grossly intact   Symmetric reflexes at the patella no proximal muscle weakness, No visible tremor with both outstretched hands  LYMPH: No cervical, supraclavicular,  adenopathy palpated.   SKIN: No rashes, lesions. Turgor is normal.  FOOT: Normal sensation to monofilament testing, normal pulses, no ulcers.  Normal Vibration quantitative sensation test.      ASSESSMENT/PLAN:   1. Uncontrolled type 1 diabetes mellitus with hyperglycemia (HCC)  Uncontrolled with an A1c of 8%    Lifestyle modifications discussed    Medication regimen follow  Novolog 1-3 units per meal-continue  Lantus 12-15 units at night, takes 12 if BG <150-continue    - POCT Hemoglobin A1C  - Insulin Pen Needle 32 G x 4 mm (BD PEN NEEDLE ADA U/F); Using 1 pen needle with insulin injections 4 times a day  Dispense: 300 Each; Refill: 11  - Comp Metabolic Panel; Future  - TSH; Future  - FREE THYROXINE; Future    2. Microalbuminuria  Unstable  Discussed importance of protecting his kidneys-patient is agreeable to start taking lisinopril again 2.5 mg daily, side effects discussed  - lisinopril (PRINIVIL) 2.5 MG Tab; Take 1 Tablet " by mouth every day.  Dispense: 90 Tablet; Refill: 2  - MICROALBUMIN CREAT RATIO URINE; Future    3. Other hyperlipidemia  Unstable  Discussed importance of good cholesterol control to avoid cardiovascular disease complications  Patient is agreeable to start taking atorvastatin 10 mg daily again  Prescription sent to pharmacy  - atorvastatin (LIPITOR) 10 MG Tab; Take 1 Tablet by mouth every evening.  Dispense: 90 Tablet; Refill: 2  - Lipid Profile; Future    4. Vitamin D deficiency  Unstable  Continue regimen-HPI  - VITAMIN D,25 HYDROXY (DEFICIENCY); Future     Disposition: Make an appointment to follow-up in 6 months  Do your blood work 1 to 2 weeks prior to next appointment    Thank you kindly for allowing me to participate in the diabetes care plan for this patient.    BRIAN MattR.N.  09/14/2022    CC:   RAFA Koch

## 2023-01-12 ENCOUNTER — HOSPITAL ENCOUNTER (OUTPATIENT)
Facility: MEDICAL CENTER | Age: 58
End: 2023-01-12
Payer: MEDICARE

## 2023-01-12 PROCEDURE — 82274 ASSAY TEST FOR BLOOD FECAL: CPT

## 2023-01-18 RX ORDER — INSULIN GLARGINE 100 [IU]/ML
INJECTION, SOLUTION SUBCUTANEOUS
Qty: 3 EACH | Refills: 4 | Status: SHIPPED | OUTPATIENT
Start: 2023-01-18 | End: 2023-08-10

## 2023-01-18 NOTE — TELEPHONE ENCOUNTER
Received request via: Pharmacy    Was the patient seen in the last year in this department? Yes    Does the patient have an active prescription (recently filled or refills available) for medication(s) requested?  yes    Does the patient have [facility] and need 100 day supply (blood pressure, diabetes and cholesterol meds only)? Yes, quantity updated to 100 days

## 2023-01-20 LAB — IMM ASSAY OCC BLD FITOB: NEGATIVE

## 2023-01-24 ENCOUNTER — TELEPHONE (OUTPATIENT)
Dept: MEDICAL GROUP | Facility: PHYSICIAN GROUP | Age: 58
End: 2023-01-24
Payer: MEDICARE

## 2023-01-24 NOTE — TELEPHONE ENCOUNTER
----- Message from Vincent Ding M.D. sent at 1/21/2023  4:36 PM PST -----  Pls notify pt    Stool test to check for the presence of blood: Negative

## 2023-01-24 NOTE — TELEPHONE ENCOUNTER
Phone Number Called: [phone] (home)       Call outcome: Spoke to patient regarding message below.

## 2023-02-13 ENCOUNTER — HOSPITAL ENCOUNTER (OUTPATIENT)
Dept: LAB | Facility: MEDICAL CENTER | Age: 58
End: 2023-02-13
Payer: MEDICARE

## 2023-02-13 ENCOUNTER — HOSPITAL ENCOUNTER (OUTPATIENT)
Facility: MEDICAL CENTER | Age: 58
End: 2023-02-13
Payer: MEDICARE

## 2023-02-13 LAB
ALBUMIN SERPL BCP-MCNC: 4.5 G/DL (ref 3.2–4.9)
ALBUMIN/GLOB SERPL: 1.9 G/DL
ALP SERPL-CCNC: 102 U/L (ref 30–99)
ALT SERPL-CCNC: 30 U/L (ref 2–50)
ANION GAP SERPL CALC-SCNC: 9 MMOL/L (ref 7–16)
AST SERPL-CCNC: 22 U/L (ref 12–45)
BASOPHILS # BLD AUTO: 1.1 % (ref 0–1.8)
BASOPHILS # BLD: 0.08 K/UL (ref 0–0.12)
BILIRUB SERPL-MCNC: 0.5 MG/DL (ref 0.1–1.5)
BUN SERPL-MCNC: 14 MG/DL (ref 8–22)
CALCIUM ALBUM COR SERPL-MCNC: 9.4 MG/DL (ref 8.5–10.5)
CALCIUM SERPL-MCNC: 9.8 MG/DL (ref 8.5–10.5)
CHLORIDE SERPL-SCNC: 106 MMOL/L (ref 96–112)
CO2 SERPL-SCNC: 25 MMOL/L (ref 20–33)
CREAT SERPL-MCNC: 0.99 MG/DL (ref 0.5–1.4)
CRP SERPL HS-MCNC: <0.3 MG/DL (ref 0–0.75)
EOSINOPHIL # BLD AUTO: 0.76 K/UL (ref 0–0.51)
EOSINOPHIL NFR BLD: 10.3 % (ref 0–6.9)
ERYTHROCYTE [DISTWIDTH] IN BLOOD BY AUTOMATED COUNT: 48.4 FL (ref 35.9–50)
GFR SERPLBLD CREATININE-BSD FMLA CKD-EPI: 88 ML/MIN/1.73 M 2
GLOBULIN SER CALC-MCNC: 2.4 G/DL (ref 1.9–3.5)
GLUCOSE SERPL-MCNC: 161 MG/DL (ref 65–99)
HCT VFR BLD AUTO: 46.1 % (ref 42–52)
HGB BLD-MCNC: 15.8 G/DL (ref 14–18)
IMM GRANULOCYTES # BLD AUTO: 0.02 K/UL (ref 0–0.11)
IMM GRANULOCYTES NFR BLD AUTO: 0.3 % (ref 0–0.9)
LYMPHOCYTES # BLD AUTO: 3.4 K/UL (ref 1–4.8)
LYMPHOCYTES NFR BLD: 46.1 % (ref 22–41)
MCH RBC QN AUTO: 33.9 PG (ref 27–33)
MCHC RBC AUTO-ENTMCNC: 34.3 G/DL (ref 33.7–35.3)
MCV RBC AUTO: 98.9 FL (ref 81.4–97.8)
MONOCYTES # BLD AUTO: 0.5 K/UL (ref 0–0.85)
MONOCYTES NFR BLD AUTO: 6.8 % (ref 0–13.4)
NEUTROPHILS # BLD AUTO: 2.62 K/UL (ref 1.82–7.42)
NEUTROPHILS NFR BLD: 35.4 % (ref 44–72)
NRBC # BLD AUTO: 0 K/UL
NRBC BLD-RTO: 0 /100 WBC
PLATELET # BLD AUTO: 197 K/UL (ref 164–446)
PMV BLD AUTO: 11.2 FL (ref 9–12.9)
POTASSIUM SERPL-SCNC: 5.1 MMOL/L (ref 3.6–5.5)
PROT SERPL-MCNC: 6.9 G/DL (ref 6–8.2)
RBC # BLD AUTO: 4.66 M/UL (ref 4.7–6.1)
SODIUM SERPL-SCNC: 140 MMOL/L (ref 135–145)
T4 SERPL-MCNC: 6.7 UG/DL (ref 4–12)
TSH SERPL DL<=0.005 MIU/L-ACNC: 1.16 UIU/ML (ref 0.38–5.33)
WBC # BLD AUTO: 7.4 K/UL (ref 4.8–10.8)

## 2023-02-13 PROCEDURE — 83516 IMMUNOASSAY NONANTIBODY: CPT

## 2023-02-13 PROCEDURE — 84443 ASSAY THYROID STIM HORMONE: CPT

## 2023-02-13 PROCEDURE — 86258 DGP ANTIBODY EACH IG CLASS: CPT | Mod: 91

## 2023-02-13 PROCEDURE — 87329 GIARDIA AG IA: CPT

## 2023-02-13 PROCEDURE — 86140 C-REACTIVE PROTEIN: CPT

## 2023-02-13 PROCEDURE — 82705 FATS/LIPIDS FECES QUAL: CPT

## 2023-02-13 PROCEDURE — 87328 CRYPTOSPORIDIUM AG IA: CPT

## 2023-02-13 PROCEDURE — 86364 TISS TRNSGLTMNASE EA IG CLAS: CPT | Mod: 91

## 2023-02-13 PROCEDURE — 84436 ASSAY OF TOTAL THYROXINE: CPT

## 2023-02-13 PROCEDURE — 36415 COLL VENOUS BLD VENIPUNCTURE: CPT

## 2023-02-13 PROCEDURE — 80053 COMPREHEN METABOLIC PANEL: CPT

## 2023-02-13 PROCEDURE — 83993 ASSAY FOR CALPROTECTIN FECAL: CPT

## 2023-02-13 PROCEDURE — 85025 COMPLETE CBC W/AUTO DIFF WBC: CPT

## 2023-02-13 PROCEDURE — 82653 EL-1 FECAL QUANTITATIVE: CPT

## 2023-02-17 LAB — GLIADIN PEPTIDE+TTG IGA+IGG SER QL IA: 24 UNITS (ref 0–19)

## 2023-02-18 LAB
GLIADIN IGA SER IA-ACNC: 15 UNITS (ref 0–19)
TTG IGA SER IA-ACNC: <2 U/ML (ref 0–3)
TTG IGG SER IA-ACNC: 2 U/ML (ref 0–5)

## 2023-02-19 LAB — GLIADIN IGG SER IA-ACNC: 8 UNITS (ref 0–19)

## 2023-02-21 LAB
G LAMBLIA+C PARVUM AG STL QL RAPID: NORMAL
SIGNIFICANT IND 70042: NORMAL
SITE SITE: NORMAL
SOURCE SOURCE: NORMAL

## 2023-02-24 LAB
FAT STL QL: NORMAL
NEUTRAL FAT STL QL: NORMAL

## 2023-02-25 LAB
CALPROTECTIN STL-MCNT: 36 UG/G
ELASTASE PANC STL-MCNT: <10 UG/G

## 2023-03-07 ENCOUNTER — HOSPITAL ENCOUNTER (OUTPATIENT)
Dept: LAB | Facility: MEDICAL CENTER | Age: 58
End: 2023-03-07
Payer: MEDICARE

## 2023-03-07 DIAGNOSIS — E55.9 VITAMIN D DEFICIENCY: ICD-10-CM

## 2023-03-07 DIAGNOSIS — R80.9 MICROALBUMINURIA: ICD-10-CM

## 2023-03-07 DIAGNOSIS — E10.65 UNCONTROLLED TYPE 1 DIABETES MELLITUS WITH HYPERGLYCEMIA (HCC): ICD-10-CM

## 2023-03-07 DIAGNOSIS — E78.49 OTHER HYPERLIPIDEMIA: ICD-10-CM

## 2023-03-07 LAB
25(OH)D3 SERPL-MCNC: 18 NG/ML (ref 30–100)
ALBUMIN SERPL BCP-MCNC: 4.4 G/DL (ref 3.2–4.9)
ALBUMIN/GLOB SERPL: 1.7 G/DL
ALP SERPL-CCNC: 96 U/L (ref 30–99)
ALT SERPL-CCNC: 17 U/L (ref 2–50)
ANION GAP SERPL CALC-SCNC: 9 MMOL/L (ref 7–16)
AST SERPL-CCNC: 15 U/L (ref 12–45)
BILIRUB SERPL-MCNC: 0.4 MG/DL (ref 0.1–1.5)
BUN SERPL-MCNC: 11 MG/DL (ref 8–22)
CALCIUM ALBUM COR SERPL-MCNC: 9.3 MG/DL (ref 8.5–10.5)
CALCIUM SERPL-MCNC: 9.6 MG/DL (ref 8.5–10.5)
CHLORIDE SERPL-SCNC: 106 MMOL/L (ref 96–112)
CHOLEST SERPL-MCNC: 181 MG/DL (ref 100–199)
CO2 SERPL-SCNC: 23 MMOL/L (ref 20–33)
CREAT SERPL-MCNC: 0.88 MG/DL (ref 0.5–1.4)
CREAT UR-MCNC: 116.26 MG/DL
FASTING STATUS PATIENT QL REPORTED: NORMAL
GFR SERPLBLD CREATININE-BSD FMLA CKD-EPI: 99 ML/MIN/1.73 M 2
GLOBULIN SER CALC-MCNC: 2.6 G/DL (ref 1.9–3.5)
GLUCOSE SERPL-MCNC: 127 MG/DL (ref 65–99)
HDLC SERPL-MCNC: 40 MG/DL
LDLC SERPL CALC-MCNC: 125 MG/DL
MICROALBUMIN UR-MCNC: <1.2 MG/DL
MICROALBUMIN/CREAT UR: NORMAL MG/G (ref 0–30)
POTASSIUM SERPL-SCNC: 4.2 MMOL/L (ref 3.6–5.5)
PROT SERPL-MCNC: 7 G/DL (ref 6–8.2)
SODIUM SERPL-SCNC: 138 MMOL/L (ref 135–145)
T4 FREE SERPL-MCNC: 1.01 NG/DL (ref 0.93–1.7)
TRIGL SERPL-MCNC: 80 MG/DL (ref 0–149)
TSH SERPL DL<=0.005 MIU/L-ACNC: 1.06 UIU/ML (ref 0.38–5.33)

## 2023-03-07 PROCEDURE — 84443 ASSAY THYROID STIM HORMONE: CPT

## 2023-03-07 PROCEDURE — 82306 VITAMIN D 25 HYDROXY: CPT

## 2023-03-07 PROCEDURE — 80061 LIPID PANEL: CPT

## 2023-03-07 PROCEDURE — 82570 ASSAY OF URINE CREATININE: CPT

## 2023-03-07 PROCEDURE — 84439 ASSAY OF FREE THYROXINE: CPT

## 2023-03-07 PROCEDURE — 80053 COMPREHEN METABOLIC PANEL: CPT

## 2023-03-07 PROCEDURE — 36415 COLL VENOUS BLD VENIPUNCTURE: CPT

## 2023-03-07 PROCEDURE — 82043 UR ALBUMIN QUANTITATIVE: CPT

## 2023-03-14 ENCOUNTER — OFFICE VISIT (OUTPATIENT)
Dept: ENDOCRINOLOGY | Facility: MEDICAL CENTER | Age: 58
End: 2023-03-14
Payer: MEDICARE

## 2023-03-14 VITALS
OXYGEN SATURATION: 96 % | HEART RATE: 117 BPM | DIASTOLIC BLOOD PRESSURE: 70 MMHG | WEIGHT: 117 LBS | SYSTOLIC BLOOD PRESSURE: 128 MMHG | HEIGHT: 69 IN | BODY MASS INDEX: 17.33 KG/M2

## 2023-03-14 DIAGNOSIS — E78.49 OTHER HYPERLIPIDEMIA: ICD-10-CM

## 2023-03-14 DIAGNOSIS — E10.65 UNCONTROLLED TYPE 1 DIABETES MELLITUS WITH HYPERGLYCEMIA (HCC): ICD-10-CM

## 2023-03-14 DIAGNOSIS — R80.9 MICROALBUMINURIA: ICD-10-CM

## 2023-03-14 DIAGNOSIS — E55.9 VITAMIN D DEFICIENCY: ICD-10-CM

## 2023-03-14 PROCEDURE — 99211 OFF/OP EST MAY X REQ PHY/QHP: CPT

## 2023-03-14 ASSESSMENT — FIBROSIS 4 INDEX: FIB4 SCORE: 1.09

## 2023-03-14 NOTE — PROGRESS NOTES
CHIEF COMPLAINT: Patient is here for follow up of confirmed Type 1 Diabetes Mellitus.    HPI:   Celso Rose is a very pleasant 56 y.o. male         1.  Uncontrolled Type 1 diabetes mellitus with hyperglycemia:  Diagnosed 22 years ago     Ref. Range 4/16/2021 07:41   C-Peptide Latest Ref Range: 0.8 - 3.5 ng/mL 0.2 (L)      Latest Reference Range & Units 2/15/22 07:38   TSH 0.380 - 5.330 uIU/mL 2.090     Current diabetes regimen:  Novolog 1-3 units per meal  Lantus 12-15 units at night, takes 12 if BG <150      POC a1c on 09/14/2022 at 8%  Serum A1c 04/13/2021 10.5%    patient utilizing freestyle reynaldo 2 CGM device.  Device downloaded and reviewed with patient, average blood glucose of , time in range %  Fasting BG s.    Patient reports hypoglycemic events-occasionally before bedtime.       Diabetes Complications   Retinopathy: No known retinopathy.    Last eye exam: appt for next month, [facility].   Reports starting Macular Degeneration.   Neuropathy: Denies numbness and tingling in BUE and BLE.    Exercise: Minimal.  Diet: Fair.     Latest Reference Range & Units 03/07/23 07:53   TSH 0.380 - 5.330 uIU/mL 1.060   Free T-4 0.93 - 1.70 ng/dL 1.01       2.  Microalbuminuria:  Patient currently not taking lisinopril 2.5 mg daily.  BP currently      Latest Reference Range & Units 03/07/23 07:53   Micro Alb Creat Ratio 0 - 30 mg/g see below   Creatinine, Urine mg/dL 116.26   Microalbumin, Urine Random mg/dL <1.2     3.  Other hyperlipidemia:  Currently not taking Lipitor 10 mg daily     Latest Reference Range & Units 03/07/23 07:53   Cholesterol,Tot 100 - 199 mg/dL 181   Triglycerides 0 - 149 mg/dL 80   HDL >=40 mg/dL 40   LDL <100 mg/dL 125 (H)     4.  Vitamin D deficiency:  Vitamin D deficiency, currently taking vitamin D 50,000 IU daily.     Latest Reference Range & Units 03/07/23 07:53   25-Hydroxy   Vitamin D 25 30 - 100 ng/mL 18 (L)       Patient's medications, allergies, and social histories  were reviewed and updated as appropriate.      ROS:     CONS:     No fever, no chills, no weight loss, no fatigue   EYES:      No diplopia, no blurry vision, no redness of eyes, no swelling of eyelids   ENT:    No hearing loss, No ear pain, No sore throat, no dysphagia, no neck swelling   CV:     No chest pain, no palpitations, no claudication, no orthopnea, no PND   PULM:    No SOB, no cough, no hemoptysis, no wheezing    GI:   No nausea, no vomiting, no diarrhea, no constipation, no bloody stools   :  Passing urine well, no dysuria, no hematuria   ENDO:   No polyuria, no polydipsia, no heat intolerance, no cold intolerance   NEURO: No headaches, no dizziness, no convulsions, no tremors   MUSC:  No joint swellings, no arthralgias, no myalgias, no weakness   SKIN:   No rash, no ulcers, no dry skin   PSYCH:   No depression, no anxiety, no difficulty sleeping       Past Medical History:  Patient Active Problem List    Diagnosis Date Noted    Chronic diarrhea 02/14/2022    Dyslipidemia 09/15/2021    Chronic pain of right knee 09/14/2021    Bilateral shoulder pain 09/14/2021    Anxiety 09/14/2021    IBS (irritable bowel syndrome) 12/04/2020    Diabetes mellitus type I ([facility]) 12/04/2020    Cannabis dependence, continuous ([facility]) 01/30/2019    Bipolar 2 disorder, major depressive episode ([facility]) 07/19/2018    Tobacco abuse 07/18/2018       Past Surgical History:  Past Surgical History:   Procedure Laterality Date    KNEE ARTHROSCOPY      SHOULDER ARTHROSCOPY          Allergies:  Patient has no known allergies.     Current Medications:    Current Outpatient Medications:     insulin glargine (LANTUS SOLOSTAR) 100 UNIT/ML Solution Pen-injector injection, INJECT 30 UNITS SUBCUTANEOUSLY IN THE EVENING, Disp: 3 Each, Rfl: 4    Insulin Pen Needle 32 G x 4 mm (BD PEN NEEDLE ADA U/F), Using 1 pen needle with insulin injections 4 times a day, Disp: 300 Each, Rfl: 11    lisinopril (PRINIVIL) 2.5 MG Tab, Take 1 Tablet by mouth every  day., Disp: 90 Tablet, Rfl: 2    atorvastatin (LIPITOR) 10 MG Tab, Take 1 Tablet by mouth every evening., Disp: 90 Tablet, Rfl: 2    insulin aspart (NOVOLOG FLEXPEN) 100 UNIT/ML injection PEN, Inject 10 Units under the skin 3 times a day before meals. (Patient taking differently: Inject 0-3 Units under the skin 3 times a day before meals.), Disp: 15 mL, Rfl: 6    Continuous Blood Gluc Sensor (FREESTYLE SYED 2 SENSOR[place] [facility] [place] FOR CONTINUOUS BLOOD SUGAR MONITORING. CHANGE EVERY 14 DAYS AS DIRECTED, Disp: 2 Each, Rfl: 11    CONTOUR NEXT TEST strip, USE STRIP TO CHECK GLUCOSE THREE TIMES DAILY AND AS NEEDED FOR SIGNS AND SYMPTOMS OR LOW SUGAR, Disp: , Rfl:     Insulin Pen Needle 32 G x 4 mm, 1 Each 4 Times a Day,Before Meals and at Bedtime., Disp: 400 Each, Rfl: 3    Blood Glucose Test Strips, Test strips order: Test strips for Bernard Contour Next meter. Sig: use 3 times daily and prn ssx high or low sugar, Disp: 100 Each, Rfl: 3    Social History:  Social History     Socioeconomic History    Marital status: Single     Spouse name: Not on file    Number of children: Not on file    Years of education: Not on file    Highest education level: Not on file   Occupational History    Not on file   Tobacco Use    Smoking status: Smoker, Current Status Unknown     Packs/day: 1.00     Years: 1.00     Pack years: 1.00     Types: Cigarettes     Last attempt to quit: 2021     Years since quittin.9    Smokeless tobacco: Never    Tobacco comments:     started smoking at age 14 quit for a couple months then started    Vaping Use    Vaping Use: Never used   Substance and Sexual Activity    Alcohol use: No    Drug use: Yes     Frequency: 7.0 times per week     Types: Marijuana    Sexual activity: Yes     Partners: Female     Birth control/protection: Condom   Other Topics Concern    Not on file   Social History Narrative    Not on file     Social Determinants of Health     Financial Resource Strain: Not on file   Food  Insecurity: Not on file   Transportation Needs: Not on file   Physical Activity: Not on file   Stress: Not on file   Social Connections: Not on file   Intimate Partner Violence: Not on file   Housing Stability: Not on file        Family History:   Family History   Problem Relation Age of Onset    Heart Disease Mother     Diabetes Father     Hypertension Sister     Alcohol/Drug Sister        PHYSICAL EXAM:   Vital signs: There were no vitals taken for this visit.  GENERAL: Well-developed, well-nourished  in no apparent distress.   EYE: No ocular and eyelid asymmetry, Anicteric sclerae,  PERRL, No exophthalmos or lidlag  HENT: Hearing grossly intact, Normocephalic, atraumatic.   NECK: Supple. Trachea midline.   CARDIOVASCULAR: Regular rate and rhythm. No murmurs, rubs, or gallops.   LUNGS: Clear to auscultation bilaterally   EXTREMITIES: No clubbing, cyanosis, or edema.   NEUROLOGICAL: Cranial nerves II-XII are grossly intact   Symmetric reflexes at the patella no proximal muscle weakness, No visible tremor with both outstretched hands  LYMPH: No cervical, supraclavicular,  adenopathy palpated.   SKIN: No rashes, lesions. Turgor is normal.  FOOT: Normal sensation to monofilament testing, normal pulses, no ulcers.  Normal Vibration quantitative sensation test.      ASSESSMENT/PLAN:   1. Uncontrolled type 1 diabetes mellitus with hyperglycemia (HCC)  Uncontrolled with an A1c of 8%    Lifestyle modifications discussed    Medication regimen follow  Novolog 1-3 units per meal-continue  Lantus 12-15 units at night, takes 12 if BG <150-continue    - POCT Hemoglobin A1C  - Insulin Pen Needle 32 G x 4 mm (BD PEN NEEDLE ADA U/F); Using 1 pen needle with insulin injections 4 times a day  Dispense: 300 Each; Refill: 11  - Comp Metabolic Panel; Future  - TSH; Future  - FREE THYROXINE; Future    2. Microalbuminuria  Unstable  Discussed importance of protecting his kidneys-patient is agreeable to start taking lisinopril again 2.5  mg daily, side effects discussed  - lisinopril (PRINIVIL) 2.5 MG Tab; Take 1 Tablet by mouth every day.  Dispense: 90 Tablet; Refill: 2  - MICROALBUMIN CREAT RATIO URINE; Future    3. Other hyperlipidemia  Unstable  Discussed importance of good cholesterol control to avoid cardiovascular disease complications  Patient is agreeable to start taking atorvastatin 10 mg daily again  Prescription sent to pharmacy  - atorvastatin (LIPITOR) 10 MG Tab; Take 1 Tablet by mouth every evening.  Dispense: 90 Tablet; Refill: 2  - Lipid Profile; Future    4. Vitamin D deficiency  Unstable  Continue regimen-HPI  - VITAMIN D,25 HYDROXY (DEFICIENCY); Future     Disposition: Make an appointment to follow-up in 6 months  Do your blood work 1 to 2 weeks prior to next appointment    Thank you kindly for allowing me to participate in the diabetes care plan for this patient.    CHIKA MattP.R.N.  03/14/2023    CC:   RAFA Koch

## 2023-03-29 ENCOUNTER — OFFICE VISIT (OUTPATIENT)
Dept: ENDOCRINOLOGY | Facility: MEDICAL CENTER | Age: 58
End: 2023-03-29
Attending: INTERNAL MEDICINE
Payer: MEDICARE

## 2023-03-29 VITALS
DIASTOLIC BLOOD PRESSURE: 70 MMHG | HEIGHT: 69 IN | SYSTOLIC BLOOD PRESSURE: 112 MMHG | WEIGHT: 137 LBS | HEART RATE: 80 BPM | OXYGEN SATURATION: 94 % | BODY MASS INDEX: 20.29 KG/M2

## 2023-03-29 DIAGNOSIS — E10.65 UNCONTROLLED TYPE 1 DIABETES MELLITUS WITH HYPERGLYCEMIA (HCC): ICD-10-CM

## 2023-03-29 DIAGNOSIS — E78.5 DYSLIPIDEMIA: ICD-10-CM

## 2023-03-29 DIAGNOSIS — Z79.4 LONG-TERM INSULIN USE (HCC): ICD-10-CM

## 2023-03-29 DIAGNOSIS — E55.9 VITAMIN D DEFICIENCY: ICD-10-CM

## 2023-03-29 LAB
HBA1C MFR BLD: 8.7 % (ref ?–5.8)
POCT INT CON NEG: NEGATIVE
POCT INT CON POS: POSITIVE

## 2023-03-29 PROCEDURE — 99215 OFFICE O/P EST HI 40 MIN: CPT | Performed by: INTERNAL MEDICINE

## 2023-03-29 PROCEDURE — 83036 HEMOGLOBIN GLYCOSYLATED A1C: CPT | Performed by: INTERNAL MEDICINE

## 2023-03-29 PROCEDURE — 99213 OFFICE O/P EST LOW 20 MIN: CPT | Performed by: INTERNAL MEDICINE

## 2023-03-29 ASSESSMENT — FIBROSIS 4 INDEX: FIB4 SCORE: 1.09

## 2023-03-29 NOTE — PROGRESS NOTES
Monofilament testing with a 10 gram force: sensation intact: intact bilaterally  Visual Inspection: Feet without maceration, ulcers, fissures.  Pedal pulses: intact bilaterally

## 2023-03-29 NOTE — PROGRESS NOTES
CHIEF COMPLAINT: Patient is here for follow up of Type 1 Diabetes Mellitus    HPI:     Celso Rose is a 58 y.o. male with Type 1 Diabetes Mellitus here for follow up.    Labs from 3/29/2023 show A1c is 8.7%  Labs from September 14, 2022 show A1c was 8%    He was previously seen by the 2 nurse practitioners and has requested to switch to me  He has uncontrolled type 1 diabetes diagnosed 22 years ago.    He has been wearing a freestyle nicholas 2 CGM for over 6 months  He has a medical history of Bipolar disorder and is scheduled to see a new doctor.  He has a history of noncompliance with his oral diabetes medications  He is currently not on any medications for his bipolar disorder          Currently he is taking:  Lantus 12 to 15 units every night  NovoLog 1 to 3 units per meal    Download of his Nicholas CGM shows an average BG of 180 with TIR of 50%  He has a lot of highs post dinner       He does not know how to carb count  He does not yet understand the concept of correction insulin  He is also bolusing after meals  We spent significant amount today educating him on carb counting and proper utilization of correction insulin  But I  dont think he is a good carb counting candidate due to his cognitive capabilities.   He is very manic today and has pressured speech and he speaks in a very loud tone      He does not have diabetic kidney disease  U ACR was less than 30 on March 7, 2023        He has hyperlipidemia and is on Lipitor 10 mg daily  He admits to noncompliance with his medications  He does not have a history of cardiovascular disease or peripheral vascular disease  LDL cholesterol was 125 on March 2023      He had an eye exam in October 19, 2022 at [facility] showing evidence of diabetic retinopathy details were not given          BG Diary:03/29/23  CGM was downloaded and reviewed     Weight has been stable    Diabetes Complications   Retinopathy: Known retinopathy.  Last eye exam: October  2022  Neuropathy: Denies paresthesias or numbness in hands or feet. Denies any foot wounds.  Exercise: Minimal.  Diet: Fair.  Patient's medications, allergies, and social histories were reviewed and updated as appropriate.    ROS:     CONS:     No fever, no chills   EYES:     No diplopia, no blurry vision   CV:           No chest pain, no palpitations   PULM:     No SOB, no cough, no hemoptysis.   GI:            No nausea, no vomiting, no diarrhea, no constipation   ENDO:     No polyuria, no polydipsia, no heat intolerance, no cold intolerance       Past Medical History:  Problem List:  2022: Chronic diarrhea  2021: Dyslipidemia  2021: Chronic pain of right knee  2021: Bilateral shoulder pain  2021: Anxiety  2020: IBS (irritable bowel syndrome)  2020: Diabetes mellitus type I ([facility])  2020: Otalgia of both ears  2019: Cannabis dependence, continuous ([facility])  2018: Bipolar 2 disorder, major depressive episode ([facility])  2018: Tobacco abuse  2018: Marijuana use  2018: Uncontrolled type 2 diabetes mellitus without complication,   with long-term current use of insulin  2018: Marijuana abuse in remission  2018: Bipolar disorder, current episode mixed, moderate ([facility])      Past Surgical History:  Past Surgical History:   Procedure Laterality Date    KNEE ARTHROSCOPY      SHOULDER ARTHROSCOPY          Allergies:  Patient has no known allergies.     Social History:  Social History     Tobacco Use    Smoking status: Smoker, Current Status Unknown     Packs/day: 1.00     Years: 1.00     Pack years: 1.00     Types: Cigarettes     Last attempt to quit: 2021     Years since quittin.9    Smokeless tobacco: Never    Tobacco comments:     started smoking at age 14 quit for a couple months then started    Vaping Use    Vaping Use: Never used   Substance Use Topics    Alcohol use: No    Drug use: Yes     Frequency: 7.0 times per week     Types: Marijuana        Family History:   family  history includes Alcohol/Drug in his sister; Diabetes in his father; Heart Disease in his mother; Hypertension in his sister.      PHYSICAL EXAM:   OBJECTIVE:  Vital signs: Wt 62.1 kg (137 lb)   BMI 20.23 kg/m²   GENERAL: Well-developed, well-nourished in no apparent distress.   EYE:  No ocular asymmetry, PERRLA  HENT: Pink, moist mucous membranes.    NECK: No thyromegaly.   CARDIOVASCULAR:  No murmurs  LUNGS: Clear breath sounds  ABDOMEN: Soft, nontender   EXTREMITIES: No clubbing, cyanosis, or edema.   NEUROLOGICAL: No gross focal motor abnormalities   LYMPH: No cervical adenopathy palpated.   SKIN: No rashes, lesions.     Labs:  Lab Results   Component Value Date/Time    HBA1C 8.0 (A) 09/15/2022 08:15 AM        Lab Results   Component Value Date/Time    WBC 7.4 02/13/2023 07:59 AM    RBC 4.66 (L) 02/13/2023 07:59 AM    HEMOGLOBIN 15.8 02/13/2023 07:59 AM    MCV 98.9 (H) 02/13/2023 07:59 AM    MCH 33.9 (H) 02/13/2023 07:59 AM    MCHC 34.3 02/13/2023 07:59 AM    RDW 48.4 02/13/2023 07:59 AM    MPV 11.2 02/13/2023 07:59 AM       Lab Results   Component Value Date/Time    SODIUM 138 03/07/2023 07:53 AM    POTASSIUM 4.2 03/07/2023 07:53 AM    CHLORIDE 106 03/07/2023 07:53 AM    CO2 23 03/07/2023 07:53 AM    ANION 9.0 03/07/2023 07:53 AM    GLUCOSE 127 (H) 03/07/2023 07:53 AM    BUN 11 03/07/2023 07:53 AM    CREATININE 0.88 03/07/2023 07:53 AM    CALCIUM 9.6 03/07/2023 07:53 AM    ASTSGOT 15 03/07/2023 07:53 AM    ALTSGPT 17 03/07/2023 07:53 AM    TBILIRUBIN 0.4 03/07/2023 07:53 AM    ALBUMIN 4.4 03/07/2023 07:53 AM    TOTPROTEIN 7.0 03/07/2023 07:53 AM    GLOBULIN 2.6 03/07/2023 07:53 AM    AGRATIO 1.7 03/07/2023 07:53 AM       Lab Results   Component Value Date/Time    CHOLSTRLTOT 181 03/07/2023 0753    TRIGLYCERIDE 80 03/07/2023 0753    HDL 40 03/07/2023 0753     (H) 03/07/2023 0753       Lab Results   Component Value Date/Time    MALBCRT see below 03/07/2023 07:53 AM    MICROALBUR <1.2 03/07/2023 07:53 AM         Lab Results   Component Value Date/Time    TSHULTDENZEL 1.060 03/07/2023 0753     No results found for: FREEDIR  No results found for: FREET3  No results found for: THYSTIMIG        ASSESSMENT/PLAN:     1. Uncontrolled type 1 diabetes mellitus with hyperglycemia (HCC)  Uncontrolled secondary to hyperglycemia  CGM was downloaded and reviewed  Patient is not a good candidate for carb counting  I want him to take NovoLog 4 units with each meal fixed doses  Correction scale of 1 for every 50 above 150   continue Lantus 15 units daily  He is up-to-date with his labs  I will see him again in 3 months with repeat of A1c      2. Dyslipidemia  Stable  Recommend better compliance with atorvastatin  Repeat fasting lipids in 12 months    3. Vitamin D deficiency  Uncontrolled  I want him to take vitamin D3 5000 IU daily  Repeat calcium vitamin D in 3 months    4. Long-term insulin use (HCC)  Patient is on long-term insulin for type 1 diabetes        Return in about 4 months (around 7/29/2023).    Total time spent on day of service was over 60 minutes which included obtaining a detailed history and physical exam, ordering labs, coordinating care and scheduling future follow-up    Thank you kindly for allowing me to participate in the diabetes care plan for this patient.    Brandon Graves MD, FACE, [facility]  03/29/23    CC:   Vincent Ding M.D.

## 2023-04-01 DIAGNOSIS — E10.65 UNCONTROLLED TYPE 1 DIABETES MELLITUS WITH HYPERGLYCEMIA (HCC): ICD-10-CM

## 2023-04-03 NOTE — TELEPHONE ENCOUNTER
Received request via: Pharmacy    Was the patient seen in the last year in this department? Yes    Does the patient have an active prescription (recently filled or refills available) for medication(s) requested? No    Does the patient have [facility] and need 100 day supply (blood pressure, diabetes and cholesterol meds only)? Needs 100 day supply

## 2023-04-19 ENCOUNTER — HOSPITAL ENCOUNTER (OUTPATIENT)
Facility: MEDICAL CENTER | Age: 58
End: 2023-04-19
Attending: STUDENT IN AN ORGANIZED HEALTH CARE EDUCATION/TRAINING PROGRAM
Payer: MEDICARE

## 2023-04-19 ENCOUNTER — OFFICE VISIT (OUTPATIENT)
Dept: URGENT CARE | Facility: PHYSICIAN GROUP | Age: 58
End: 2023-04-19
Payer: MEDICARE

## 2023-04-19 VITALS
HEIGHT: 69 IN | HEART RATE: 106 BPM | SYSTOLIC BLOOD PRESSURE: 116 MMHG | RESPIRATION RATE: 16 BRPM | WEIGHT: 137 LBS | BODY MASS INDEX: 20.29 KG/M2 | TEMPERATURE: 97.1 F | OXYGEN SATURATION: 99 % | DIASTOLIC BLOOD PRESSURE: 72 MMHG

## 2023-04-19 DIAGNOSIS — R30.0 DYSURIA: ICD-10-CM

## 2023-04-19 DIAGNOSIS — Z71.1 CONCERN ABOUT STD IN MALE WITHOUT DIAGNOSIS: ICD-10-CM

## 2023-04-19 LAB
APPEARANCE UR: NORMAL
APPEARANCE UR: NORMAL
BILIRUB UR STRIP-MCNC: NEGATIVE MG/DL
BILIRUB UR STRIP-MCNC: NEGATIVE MG/DL
C TRACH DNA SPEC QL NAA+PROBE: NEGATIVE
COLOR UR AUTO: YELLOW
COLOR UR AUTO: YELLOW
GLUCOSE UR STRIP.AUTO-MCNC: NEGATIVE MG/DL
GLUCOSE UR STRIP.AUTO-MCNC: NEGATIVE MG/DL
KETONES UR STRIP.AUTO-MCNC: NEGATIVE MG/DL
KETONES UR STRIP.AUTO-MCNC: NEGATIVE MG/DL
LEUKOCYTE ESTERASE UR QL STRIP.AUTO: NEGATIVE
LEUKOCYTE ESTERASE UR QL STRIP.AUTO: NEGATIVE
N GONORRHOEA DNA SPEC QL NAA+PROBE: NEGATIVE
NITRITE UR QL STRIP.AUTO: NEGATIVE
NITRITE UR QL STRIP.AUTO: NEGATIVE
PH UR STRIP.AUTO: 5.5 [PH] (ref 5–8)
PH UR STRIP.AUTO: 5.5 [PH] (ref 5–8)
PROT UR QL STRIP: NORMAL MG/DL
PROT UR QL STRIP: NORMAL MG/DL
RBC UR QL AUTO: NORMAL
RBC UR QL AUTO: NORMAL
SP GR UR STRIP.AUTO: 1.02
SP GR UR STRIP.AUTO: 1.02
SPECIMEN SOURCE: NORMAL
UROBILINOGEN UR STRIP-MCNC: 0.2 MG/DL
UROBILINOGEN UR STRIP-MCNC: 0.2 MG/DL

## 2023-04-19 PROCEDURE — 81002 URINALYSIS NONAUTO W/O SCOPE: CPT | Performed by: STUDENT IN AN ORGANIZED HEALTH CARE EDUCATION/TRAINING PROGRAM

## 2023-04-19 PROCEDURE — 87086 URINE CULTURE/COLONY COUNT: CPT

## 2023-04-19 PROCEDURE — 87591 N.GONORRHOEAE DNA AMP PROB: CPT

## 2023-04-19 PROCEDURE — 87491 CHLMYD TRACH DNA AMP PROBE: CPT

## 2023-04-19 PROCEDURE — 99213 OFFICE O/P EST LOW 20 MIN: CPT | Performed by: STUDENT IN AN ORGANIZED HEALTH CARE EDUCATION/TRAINING PROGRAM

## 2023-04-19 RX ORDER — POLYETHYLENE GLYCOL 3350, SODIUM SULFATE ANHYDROUS, SODIUM BICARBONATE, SODIUM CHLORIDE, POTASSIUM CHLORIDE 236; 22.74; 6.74; 5.86; 2.97 G/4L; G/4L; G/4L; G/4L; G/4L
POWDER, FOR SOLUTION ORAL
COMMUNITY
Start: 2023-01-27 | End: 2023-05-23

## 2023-04-19 RX ORDER — INSULIN GLARGINE-YFGN 100 [IU]/ML
INJECTION, SOLUTION SUBCUTANEOUS
COMMUNITY
Start: 2023-04-18 | End: 2023-04-19

## 2023-04-19 RX ORDER — DOXYCYCLINE HYCLATE 100 MG
100 TABLET ORAL 2 TIMES DAILY
Qty: 14 TABLET | Refills: 0 | Status: SHIPPED | OUTPATIENT
Start: 2023-04-19 | End: 2023-04-26

## 2023-04-19 ASSESSMENT — FIBROSIS 4 INDEX: FIB4 SCORE: 1.09

## 2023-04-19 NOTE — PROGRESS NOTES
Subjective:   CHIEF COMPLAINT  Chief Complaint   Patient presents with    UTI     Past couple weeks, one kidney denies burning, itching, hasn't gotten any worse, hasn't gotten any better discomfort       HPI  Celso Rose is a 58 y.o. male who presents with a chief complaint of penile discomfort x3 weeks.  Also says he is having malodorous urine and postvoiding dribble.  Says discomfort is constant without any specific triggers or aggravating factors.  Says is not experiencing any significant dysuria.  No penile rashes, lesions or discharge.  No hematuria.  Says he circumcised.  Patient reports around the time the symptoms started he received oral sex from a female.    REVIEW OF SYSTEMS  General: no fever or chills  GI: no nausea or vomiting  See HPI for further details.    PAST MEDICAL HISTORY  Patient Active Problem List    Diagnosis Date Noted    Long-term insulin use ([facility]) 03/29/2023    Uncontrolled type 1 diabetes mellitus with hyperglycemia ([facility]) 03/29/2023    Chronic diarrhea 02/14/2022    Dyslipidemia 09/15/2021    Chronic pain of right knee 09/14/2021    Bilateral shoulder pain 09/14/2021    Anxiety 09/14/2021    IBS (irritable bowel syndrome) 12/04/2020    Diabetes mellitus type I ([facility]) 12/04/2020    Cannabis dependence, continuous ([facility]) 01/30/2019    Bipolar 2 disorder, major depressive episode ([facility]) 07/19/2018    Tobacco abuse 07/18/2018       SURGICAL HISTORY   has a past surgical history that includes knee arthroscopy and shoulder arthroscopy.    ALLERGIES  No Known Allergies    CURRENT MEDICATIONS  Home Medications       Reviewed by Ubaldo Garcia D.O. (Physician) on 04/19/23 at 1040  Med List Status: <None>     Medication Last Dose Status   atorvastatin (LIPITOR) 10 MG Tab  Active   Blood Glucose Test Strips  Active   Continuous Blood Gluc Sensor (FREESTYLE SYED 2 SENSOR) Misc  Active   CONTOUR NEXT TEST strip  Active   insulin aspart (NOVOLOG FLEXPEN) 100 UNIT/ML injection PEN  Active  "  insulin glargine (LANTUS SOLOSTAR) 100 UNIT/ML Solution Pen-injector injection  Active   Insulin Pen Needle 32 G x 4 mm (BD PEN NEEDLE ADA U/F)  Active   lisinopril (PRINIVIL) 2.5 MG Tab  Active   PEG 3350-KCl-NaBcb-NaCl-NaSulf (PEG-3350/ELECTROLYTES) 236 g Recon Soln  Active                    SOCIAL HISTORY  Social History     Tobacco Use    Smoking status: Smoker, Current Status Unknown     Packs/day: 1.00     Years: 1.00     Pack years: 1.00     Types: Cigarettes     Last attempt to quit: 2021     Years since quittin.0    Smokeless tobacco: Never    Tobacco comments:     started smoking at age 14 quit for a couple months then started    Vaping Use    Vaping Use: Never used   Substance and Sexual Activity    Alcohol use: No    Drug use: Yes     Frequency: 7.0 times per week     Types: Marijuana    Sexual activity: Yes     Partners: Female     Birth control/protection: Condom       FAMILY HISTORY  Family History   Problem Relation Age of Onset    Heart Disease Mother     Diabetes Father     Hypertension Sister     Alcohol/Drug Sister           Objective:   PHYSICAL EXAM  VITAL SIGNS: /72   Pulse (!) 106   Temp 36.2 °C (97.1 °F) (Temporal)   Resp 16   Ht 1.753 m (5' 9\")   Wt 62.1 kg (137 lb)   SpO2 99%   BMI 20.23 kg/m²     Gen: no acute distress, normal voice  Skin: dry, intact, moist mucosal membranes  Head: Atraumatic, normocephalic  Psych: normal affect, normal judgement, alert, awake    UA: Presence of blood and protein.  No nitrites or leukocytes.  No glucose.    Assessment/Plan:     1. Dysuria  POCT Urinalysis    POCT Urinalysis    URINE CULTURE(NEW)    Chlamydia/GC, PCR (Urine)    doxycycline (VIBRAMYCIN) 100 MG Tab    cefTRIAXone (ROCEPHIN) 500 mg, lidocaine (XYLOCAINE) 1 % 2 mL for IM use      2. Concern about STD in male without diagnosis  POCT Urinalysis    URINE CULTURE(NEW)    Chlamydia/GC, PCR (Urine)    doxycycline (VIBRAMYCIN) 100 MG Tab    cefTRIAXone (ROCEPHIN) 500 mg, " lidocaine (XYLOCAINE) 1 % 2 mL for IM use      Symptoms developed after receiving oral sex with a female and suspect underlying STI source of symptoms.  Differentials include acute cystitis however he is not experiencing any overt dysuria. Will start empiric treatment.  -Rocephin 5 mg IM x1 given in the clinic  -Doxycycline 1 mg po bid x7 days  -No intercourse for 2 weeks  -Contact patient at [phone] with test results. Ok to leave voicemail.  - Return to urgent care any new/worsening symptoms or further questions or concerns.  Patient understood everything discussed.  All questions were answered.      Differential diagnosis, natural history, supportive care, and indications for immediate follow-up discussed. All questions answered. Patient agrees with the plan of care.    Follow-up as needed if symptoms worsen or fail to improve to PCP, Urgent care or Emergency Room.    Please note that this dictation was created using voice recognition software. I have made a reasonable attempt to correct obvious errors, but I expect that there are errors of grammar and possibly content that I did not discover before finalizing the note.

## 2023-04-21 ENCOUNTER — TELEPHONE (OUTPATIENT)
Dept: HEALTH INFORMATION MANAGEMENT | Facility: OTHER | Age: 58
End: 2023-04-21
Payer: MEDICARE

## 2023-04-21 LAB
BACTERIA UR CULT: NORMAL
SIGNIFICANT IND 70042: NORMAL
SITE SITE: NORMAL
SOURCE SOURCE: NORMAL

## 2023-04-24 ENCOUNTER — HOSPITAL ENCOUNTER (OUTPATIENT)
Dept: RADIOLOGY | Facility: MEDICAL CENTER | Age: 58
End: 2023-04-24
Payer: MEDICARE

## 2023-04-24 ENCOUNTER — OFFICE VISIT (OUTPATIENT)
Dept: BEHAVIORAL HEALTH | Facility: CLINIC | Age: 58
End: 2023-04-24
Payer: MEDICARE

## 2023-04-24 DIAGNOSIS — F41.1 GENERALIZED ANXIETY DISORDER: ICD-10-CM

## 2023-04-24 DIAGNOSIS — K86.89 PANCREATIC FISTULA: ICD-10-CM

## 2023-04-24 DIAGNOSIS — F31.81 BIPOLAR 2 DISORDER, MAJOR DEPRESSIVE EPISODE (HCC): ICD-10-CM

## 2023-04-24 PROCEDURE — 74170 CT ABD WO CNTRST FLWD CNTRST: CPT

## 2023-04-24 PROCEDURE — 90791 PSYCH DIAGNOSTIC EVALUATION: CPT | Performed by: MARRIAGE & FAMILY THERAPIST

## 2023-04-24 PROCEDURE — 700117 HCHG RX CONTRAST REV CODE 255

## 2023-04-24 RX ADMIN — IOHEXOL 100 ML: 350 INJECTION, SOLUTION INTRAVENOUS at 16:05

## 2023-04-24 ASSESSMENT — ANXIETY QUESTIONNAIRES
7. FEELING AFRAID AS IF SOMETHING AWFUL MIGHT HAPPEN: SEVERAL DAYS
1. FEELING NERVOUS, ANXIOUS, OR ON EDGE: NEARLY EVERY DAY
IF YOU CHECKED OFF ANY PROBLEMS ON THIS QUESTIONNAIRE, HOW DIFFICULT HAVE THESE PROBLEMS MADE IT FOR YOU TO DO YOUR WORK, TAKE CARE OF THINGS AT HOME, OR GET ALONG WITH OTHER PEOPLE: SOMEWHAT DIFFICULT
4. TROUBLE RELAXING: SEVERAL DAYS
2. NOT BEING ABLE TO STOP OR CONTROL WORRYING: NEARLY EVERY DAY
5. BEING SO RESTLESS THAT IT IS HARD TO SIT STILL: SEVERAL DAYS
6. BECOMING EASILY ANNOYED OR IRRITABLE: NEARLY EVERY DAY
GAD7 TOTAL SCORE: 14
3. WORRYING TOO MUCH ABOUT DIFFERENT THINGS: MORE THAN HALF THE DAYS

## 2023-04-24 ASSESSMENT — PATIENT HEALTH QUESTIONNAIRE - PHQ9
SUM OF ALL RESPONSES TO PHQ QUESTIONS 1-9: 21
5. POOR APPETITE OR OVEREATING: 1 - SEVERAL DAYS
CLINICAL INTERPRETATION OF PHQ2 SCORE: 6
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
## 2023-05-01 ENCOUNTER — OFFICE VISIT (OUTPATIENT)
Dept: BEHAVIORAL HEALTH | Facility: CLINIC | Age: 58
End: 2023-05-01
Payer: MEDICARE

## 2023-05-01 DIAGNOSIS — F41.1 GENERALIZED ANXIETY DISORDER: ICD-10-CM

## 2023-05-01 DIAGNOSIS — F31.81 BIPOLAR 2 DISORDER, MAJOR DEPRESSIVE EPISODE (HCC): ICD-10-CM

## 2023-05-01 PROCEDURE — 90834 PSYTX W PT 45 MINUTES: CPT | Mod: MISDOCU | Performed by: MARRIAGE & FAMILY THERAPIST
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
## 2023-05-02 DIAGNOSIS — E10.65 UNCONTROLLED TYPE 1 DIABETES MELLITUS WITH HYPERGLYCEMIA (HCC): ICD-10-CM

## 2023-05-08 ENCOUNTER — OFFICE VISIT (OUTPATIENT)
Dept: MEDICAL GROUP | Facility: PHYSICIAN GROUP | Age: 58
End: 2023-05-08
Payer: MEDICARE

## 2023-05-08 VITALS
SYSTOLIC BLOOD PRESSURE: 122 MMHG | BODY MASS INDEX: 19.79 KG/M2 | HEART RATE: 74 BPM | WEIGHT: 133.6 LBS | OXYGEN SATURATION: 98 % | DIASTOLIC BLOOD PRESSURE: 74 MMHG | HEIGHT: 69 IN | TEMPERATURE: 98.4 F | RESPIRATION RATE: 16 BRPM

## 2023-05-08 DIAGNOSIS — L98.9 BENIGN SKIN GROWTH: ICD-10-CM

## 2023-05-08 DIAGNOSIS — F12.20 CANNABIS DEPENDENCE, CONTINUOUS (HCC): Chronic | ICD-10-CM

## 2023-05-08 PROCEDURE — 99213 OFFICE O/P EST LOW 20 MIN: CPT

## 2023-05-08 RX ORDER — SULFAMETHOXAZOLE AND TRIMETHOPRIM 800; 160 MG/1; MG/1
1 TABLET ORAL 2 TIMES DAILY
Qty: 10 TABLET | Refills: 0 | Status: SHIPPED | OUTPATIENT
Start: 2023-05-08 | End: 2023-05-13

## 2023-05-08 ASSESSMENT — FIBROSIS 4 INDEX: FIB4 SCORE: 1.09

## 2023-05-08 NOTE — PROGRESS NOTES
"CC:   Chief Complaint   Patient presents with    Bump     Under L armpit         HISTORY OF PRESENT ILLNESS: Patient is a 58 y.o. male established patient who presents today to discuss the following problems below:     Patient presents for evaluation of a lump under his left armpit. He reports that the lump has been there for the last week. He had bought some new deodorant and feels it could be from that. He has had a history of multiple cysts.  It is painful.     Patient reports that he quit smoking in November and is feeling \"off\" He states that he had imaging of the pancreas done a couple of days ago through [facility]. He will follow up with them for results     Review of Systems: Otherwise negative except for as stated above.      Exam: /74 (BP Location: Right arm, Patient Position: Sitting, BP Cuff Size: Adult)   Pulse 74   Temp 36.9 °C (98.4 °F) (Temporal)   Resp 16   Ht 1.753 m (5' 9\")   Wt 60.6 kg (133 lb 9.6 oz)   SpO2 98%  Body mass index is 19.73 kg/m².    Physical Exam  Constitutional:       Appearance: Normal appearance.   Cardiovascular:      Rate and Rhythm: Normal rate and regular rhythm.      Heart sounds: Normal heart sounds.   Pulmonary:      Effort: Pulmonary effort is normal.      Breath sounds: Normal breath sounds.   Musculoskeletal:      Cervical back: Normal range of motion and neck supple.   Lymphadenopathy:      Cervical: No cervical adenopathy.   Skin:     Comments: + 5 mm cyst on left armpit, erythematous and tender   Neurological:      General: No focal deficit present.      Mental Status: He is alert and oriented to person, place, and time.       Assessment/Plan:  58 y.o. male with the following -    1. Cannabis dependence, continuous (HCC)  Chronic, patient congratulated on tobacco cessation and encouraged to reduce cannabis use as he is able.     2. Benign skin growth  Acute problem. Suspect infected cyst, sent in bactrim and advised patient that it may open and " drain, use warm compresses and keep area clean. Can obtain ultrasound if not responsive to antibiotics.   - US-EXTREMITY NON VASCULAR UNILATERAL LEFT; Future  - sulfamethoxazole-trimethoprim (BACTRIM DS) 800-160 MG tablet; Take 1 Tablet by mouth 2 times a day for 5 days.  Dispense: 10 Tablet; Refill: 0      Follow-up: Return if symptoms worsen or fail to improve.    Health Maintenance: Deferred to PCP, follow up with PCP       Please note that this dictation was created using voice recognition software. I have made every reasonable attempt to correct obvious errors, but I expect that there are errors of grammar and possibly content that I did not discover before finalizing the note.    Electronically signed by NEHEMIAH Damon on May 8, 2023

## 2023-05-08 NOTE — ASSESSMENT & PLAN NOTE
Patient reports that he quit smoking tobacco in December but does continue to smoke marijuana continuously. He reports that he is not able to stop at this time.

## 2023-05-09 ENCOUNTER — OFFICE VISIT (OUTPATIENT)
Dept: BEHAVIORAL HEALTH | Facility: CLINIC | Age: 58
End: 2023-05-09
Payer: MEDICARE

## 2023-05-09 DIAGNOSIS — F31.81 BIPOLAR 2 DISORDER, MAJOR DEPRESSIVE EPISODE (HCC): ICD-10-CM

## 2023-05-09 DIAGNOSIS — F41.1 GENERALIZED ANXIETY DISORDER: ICD-10-CM

## 2023-05-09 PROCEDURE — 90834 PSYTX W PT 45 MINUTES: CPT | Mod: MISDOCU | Performed by: MARRIAGE & FAMILY THERAPIST

## 2023-05-09 NOTE — PROGRESS NOTES
[facility]   Psychotherapy Progress Note    Therapy was provided on this date in coordination with the [facility] approved Clinical Supervisor under the direct supervision of Dr. Barry Gallardo who was on site during this visit.     Name: Celso Rose  MRN: [id number]  : 1965  Age: 58 y.o.  Date of assessment: 2023  PCP: Vincent Ding M.D.  Persons in attendance: Patient  Total session time: 50 minutes      Topics addressed in psychotherapy include: Scott is experiencing some medical issues that are potentially concerning.  He is awaiting results to determine if any condition he has is potentially fatal, and he will continue to stay pro-active in terms of addressing any medical issues of concern.  He has had different sensations in parts of his body that are of note, and he is not sure how much time he has to live.  Scott also advocates that while it is not good for everybody, his use of cannabis prevents him from having severe mental health crisis.  He has been voluntarily and involuntarily committed to mental health institutions several times in his life and it has only been during times where he quit smoking marijuana. Supportive psychotherapy provided.     Objective Observations:   Participation:Active verbal participation, Attentive, Engaged, and Open to feedback   Grooming:Casual and Neat   Cognition:Alert and Fully Oriented   Eye Contact:Good   Mood:Euthymic   Affect:Flexible, Full range, Expansive, and Congruent with content   Thought Process:Logical and Goal-directed   Speech:Rate within normal limits and Volume within normal limits    Current Risk:   Suicide: low   Homicide: low   Self-Harm: low     Care Plan Updated: Yes    Does patient express agreement with the above plan? Yes     Diagnosis:  1. Bipolar 2 disorder, major depressive episode (HCC)    2. Generalized anxiety disorder        Referral appointment(s) scheduled? No       SABINE Tilley Intern

## 2023-05-15 ENCOUNTER — OFFICE VISIT (OUTPATIENT)
Dept: BEHAVIORAL HEALTH | Facility: CLINIC | Age: 58
End: 2023-05-15
Payer: MEDICARE

## 2023-05-15 DIAGNOSIS — F31.81 BIPOLAR 2 DISORDER, MAJOR DEPRESSIVE EPISODE (HCC): ICD-10-CM

## 2023-05-15 DIAGNOSIS — F41.1 GENERALIZED ANXIETY DISORDER: ICD-10-CM

## 2023-05-15 PROCEDURE — 1125F AMNT PAIN NOTED PAIN PRSNT: CPT | Mod: MISDOCU | Performed by: MARRIAGE & FAMILY THERAPIST

## 2023-05-15 PROCEDURE — 90834 PSYTX W PT 45 MINUTES: CPT | Mod: MISDOCU | Performed by: MARRIAGE & FAMILY THERAPIST

## 2023-05-15 NOTE — PROGRESS NOTES
[facility]   Psychotherapy Progress Note    Therapy was provided on this date in coordination with the [facility] approved Clinical Supervisor under the direct supervision of Dr. Christiano Argueta who was on site during this visit.     Name: Celso Rose  MRN: [id number]  : 1965  Age: 58 y.o.  Date of assessment: 5/15/2023  PCP: Vincent Ding M.D.  Persons in attendance: Patient  Total session time: 50 minutes      Topics addressed in psychotherapy include: Supportive psychotherapy provided for Scott who talks about his ongoing medical issues including a diabetes with a sensitivity to insulin which requires smaller doses be administered. We also explored Scott's relationship with women in his life including his ex-wives, and his two daughters.  Scott is always respectful of women and identifies that he doesn't think anybody would ever be good enough for his daughters even though they have their own set of problems, too.  During session Scott got MyChart set up so that he would have access to his medical reports and get immediate updates for continued medical care.     Objective Observations:   Participation:Active verbal participation, Attentive, Engaged, and Open to feedback   Grooming:Casual and Neat   Cognition:Alert and Fully Oriented   Eye Contact:Good   Mood:Euthymic   Affect:Flexible, Full range, Expansive, and Congruent with content   Thought Process:Logical and Goal-directed   Speech:Rate within normal limits and Volume within normal limits    Current Risk:   Suicide: low   Homicide: low   Self-Harm: low     Care Plan Updated: No    Diagnosis:  1. Bipolar 2 disorder, major depressive episode (HCC)    2. Generalized anxiety disorder        Referral appointment(s) scheduled? Yes       SABINE Tilley Intern

## 2023-05-23 ENCOUNTER — TELEMEDICINE (OUTPATIENT)
Dept: BEHAVIORAL HEALTH | Facility: CLINIC | Age: 58
End: 2023-05-23
Payer: MEDICARE

## 2023-05-23 ENCOUNTER — DOCUMENTATION (OUTPATIENT)
Dept: BEHAVIORAL HEALTH | Facility: CLINIC | Age: 58
End: 2023-05-23
Payer: MEDICARE

## 2023-05-23 DIAGNOSIS — R41.840 ATTENTION AND CONCENTRATION DEFICIT: ICD-10-CM

## 2023-05-23 DIAGNOSIS — F12.20 CANNABIS USE DISORDER, MODERATE, DEPENDENCE (HCC): ICD-10-CM

## 2023-05-23 DIAGNOSIS — F41.1 GAD (GENERALIZED ANXIETY DISORDER): ICD-10-CM

## 2023-05-23 PROBLEM — F41.9 ANXIETY: Status: RESOLVED | Noted: 2021-09-14 | Resolved: 2023-05-23

## 2023-05-23 PROCEDURE — 99205 OFFICE O/P NEW HI 60 MIN: CPT | Mod: 95 | Performed by: PSYCHIATRY & NEUROLOGY

## 2023-05-23 PROCEDURE — 96127 BRIEF EMOTIONAL/BEHAV ASSMT: CPT | Mod: 95 | Performed by: PSYCHIATRY & NEUROLOGY

## 2023-05-23 ASSESSMENT — PATIENT HEALTH QUESTIONNAIRE - PHQ9
SUM OF ALL RESPONSES TO PHQ QUESTIONS 1-9: 11
CLINICAL INTERPRETATION OF PHQ2 SCORE: 2
5. POOR APPETITE OR OVEREATING: 0 - NOT AT ALL
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
## 2023-05-30 ENCOUNTER — OFFICE VISIT (OUTPATIENT)
Dept: BEHAVIORAL HEALTH | Facility: CLINIC | Age: 58
End: 2023-05-30
Payer: MEDICARE

## 2023-05-30 DIAGNOSIS — F31.81 BIPOLAR 2 DISORDER, MAJOR DEPRESSIVE EPISODE (HCC): ICD-10-CM

## 2023-05-30 DIAGNOSIS — F41.1 GENERALIZED ANXIETY DISORDER: ICD-10-CM

## 2023-05-30 PROCEDURE — 90834 PSYTX W PT 45 MINUTES: CPT | Performed by: PSYCHIATRY & NEUROLOGY

## 2023-05-30 NOTE — PROGRESS NOTES
[facility]   Psychotherapy Progress Note    Therapy was provided on this date in coordination with the [facility] approved Clinical Supervisor under the direct supervision of Dr. Christiano Argueta who was on site during this visit.     Name: Celso Rose  MRN: [id number]  : 1965  Age: 58 y.o.  Date of assessment: 2023  PCP: Vincent Ding M.D.  Persons in attendance: Patient  Total session time: 50 minutes      Topics addressed in psychotherapy include: Scott lent money to a family friend last week, and although they agreed on 6 months for her to pay him back, Scott is having immediate concerns that he will never get paid back due to this woman's troubles with drugs and gambling.  Scott is open about the fact that he uses cannabis daily for his mental health but he draws a fine line between cannabis and stimulants/ methamphetamines and he does not like to interact with users of those drugs.  Scott reports that he is probably going to take a break from therapy and just check in from time to time as needed. He sold one of his trucks and will sell another one and then buy a travel trailer to travel the country for a little while.     Objective Observations:   Participation:Active verbal participation, Attentive, Engaged, and Open to feedback   Grooming:Casual and Neat   Cognition:Alert and Fully Oriented   Eye Contact:Good   Mood:Euthymic   Affect:Flexible, Full range, Expansive, Congruent with content, and Bright   Thought Process:Logical and Goal-directed   Speech:Rate within normal limits and Volume within normal limits    Current Risk:   Suicide: low   Homicide: low   Self-Harm: low     Care Plan Updated: Yes    Does patient express agreement with the above plan? Yes     Diagnosis:  1. Bipolar 2 disorder, major depressive episode (HCC)    2. Generalized anxiety disorder        Referral appointment(s) scheduled? No       SABINE Tilley Intern

## 2023-06-01 NOTE — TELEPHONE ENCOUNTER
GEORGE to Dr. Graves.    Frederic with [facility] called in stating that they got a 12 month approval for the patients Nicholas 2 sensors.   Approval #[id number]

## 2023-06-13 ENCOUNTER — APPOINTMENT (OUTPATIENT)
Dept: BEHAVIORAL HEALTH | Facility: CLINIC | Age: 58
End: 2023-06-13
Payer: MEDICARE

## 2023-06-25 DIAGNOSIS — E10.65 UNCONTROLLED TYPE 1 DIABETES MELLITUS WITH HYPERGLYCEMIA (HCC): ICD-10-CM

## 2023-06-26 ENCOUNTER — APPOINTMENT (OUTPATIENT)
Dept: BEHAVIORAL HEALTH | Facility: CLINIC | Age: 58
End: 2023-06-26
Payer: MEDICARE

## 2023-06-26 NOTE — TELEPHONE ENCOUNTER
Received request via: Pharmacy    Was the patient seen in the last year in this department? Yes    Does the patient have an active prescription (recently filled or refills available) for medication(s) requested? No    Does the patient have [facility] and need 100 day supply (blood pressure, diabetes and cholesterol meds only)? yes

## 2023-07-06 ENCOUNTER — DOCUMENTATION (OUTPATIENT)
Dept: HEALTH INFORMATION MANAGEMENT | Facility: OTHER | Age: 58
End: 2023-07-06
Payer: MEDICARE

## 2023-07-13 DIAGNOSIS — E10.65 UNCONTROLLED TYPE 1 DIABETES MELLITUS WITH HYPERGLYCEMIA (HCC): ICD-10-CM

## 2023-07-13 RX ORDER — INSULIN ASPART 100 [IU]/ML
INJECTION, SOLUTION INTRAVENOUS; SUBCUTANEOUS
Qty: 15 ML | Refills: 0 | Status: SHIPPED | OUTPATIENT
Start: 2023-07-13 | End: 2023-08-10 | Stop reason: SDUPTHER

## 2023-07-25 DIAGNOSIS — E10.65 UNCONTROLLED TYPE 1 DIABETES MELLITUS WITH HYPERGLYCEMIA (HCC): ICD-10-CM

## 2023-08-10 ENCOUNTER — OFFICE VISIT (OUTPATIENT)
Dept: ENDOCRINOLOGY | Facility: MEDICAL CENTER | Age: 58
End: 2023-08-10
Attending: INTERNAL MEDICINE
Payer: MEDICARE

## 2023-08-10 VITALS
BODY MASS INDEX: 19.13 KG/M2 | OXYGEN SATURATION: 100 % | HEART RATE: 95 BPM | WEIGHT: 129.2 LBS | HEIGHT: 69 IN | DIASTOLIC BLOOD PRESSURE: 66 MMHG | SYSTOLIC BLOOD PRESSURE: 112 MMHG

## 2023-08-10 DIAGNOSIS — E55.9 VITAMIN D INSUFFICIENCY: ICD-10-CM

## 2023-08-10 DIAGNOSIS — R80.9 MICROALBUMINURIA: ICD-10-CM

## 2023-08-10 DIAGNOSIS — E78.49 OTHER HYPERLIPIDEMIA: ICD-10-CM

## 2023-08-10 DIAGNOSIS — Z79.4 LONG-TERM INSULIN USE (HCC): ICD-10-CM

## 2023-08-10 DIAGNOSIS — E78.5 DYSLIPIDEMIA: ICD-10-CM

## 2023-08-10 DIAGNOSIS — E55.9 VITAMIN D DEFICIENCY: ICD-10-CM

## 2023-08-10 DIAGNOSIS — E10.65 UNCONTROLLED TYPE 1 DIABETES MELLITUS WITH HYPERGLYCEMIA (HCC): ICD-10-CM

## 2023-08-10 LAB
HBA1C MFR BLD: 8.6 % (ref ?–5.8)
POCT INT CON NEG: NEGATIVE
POCT INT CON POS: POSITIVE

## 2023-08-10 PROCEDURE — 83036 HEMOGLOBIN GLYCOSYLATED A1C: CPT | Performed by: INTERNAL MEDICINE

## 2023-08-10 PROCEDURE — 99213 OFFICE O/P EST LOW 20 MIN: CPT | Performed by: INTERNAL MEDICINE

## 2023-08-10 PROCEDURE — 99215 OFFICE O/P EST HI 40 MIN: CPT | Performed by: INTERNAL MEDICINE

## 2023-08-10 PROCEDURE — 3078F DIAST BP <80 MM HG: CPT | Performed by: INTERNAL MEDICINE

## 2023-08-10 PROCEDURE — 3074F SYST BP LT 130 MM HG: CPT | Performed by: INTERNAL MEDICINE

## 2023-08-10 RX ORDER — INSULIN ASPART 100 [IU]/ML
INJECTION, SOLUTION INTRAVENOUS; SUBCUTANEOUS
Qty: 15 ML | Refills: 3 | Status: SHIPPED | OUTPATIENT
Start: 2023-08-10

## 2023-08-10 RX ORDER — INSULIN GLARGINE-YFGN 100 [IU]/ML
INJECTION, SOLUTION SUBCUTANEOUS
COMMUNITY
Start: 2023-05-24 | End: 2023-08-10 | Stop reason: SDUPTHER

## 2023-08-10 RX ORDER — LISINOPRIL 2.5 MG/1
2.5 TABLET ORAL DAILY
Qty: 90 TABLET | Refills: 2 | Status: SHIPPED | OUTPATIENT
Start: 2023-08-10

## 2023-08-10 RX ORDER — PEN NEEDLE, DIABETIC 32GX 5/32"
NEEDLE, DISPOSABLE MISCELLANEOUS
Qty: 300 EACH | Refills: 11 | Status: SHIPPED | OUTPATIENT
Start: 2023-08-10

## 2023-08-10 RX ORDER — ATORVASTATIN CALCIUM 10 MG/1
10 TABLET, FILM COATED ORAL NIGHTLY
Qty: 90 TABLET | Refills: 2 | Status: SHIPPED | OUTPATIENT
Start: 2023-08-10

## 2023-08-10 RX ORDER — ERGOCALCIFEROL 1.25 MG/1
50000 CAPSULE ORAL DAILY
Qty: 12 CAPSULE | Refills: 2 | Status: SHIPPED | OUTPATIENT
Start: 2023-08-10 | End: 2023-08-15 | Stop reason: SDUPTHER

## 2023-08-10 RX ORDER — INSULIN GLARGINE-YFGN 100 [IU]/ML
20 INJECTION, SOLUTION SUBCUTANEOUS DAILY
Qty: 15 ML | Refills: 3 | Status: SHIPPED | OUTPATIENT
Start: 2023-08-10 | End: 2023-08-16

## 2023-08-10 ASSESSMENT — FIBROSIS 4 INDEX: FIB4 SCORE: 1.09

## 2023-08-10 NOTE — PROGRESS NOTES
"Endocrinology Clinic Progress Note  PCP: Vincent Ding M.D.    HPI:  Celso Rose is a 58 y.o. old patient who is seen today by the Diabetes Nurse Specialist for review of his uncontrolled type 1 diabetes on insulin pump.    Recent changes in health: states he quit smoking in January and states he doesn't feel well since then.   States he needs to get his blood sugars under better control so that he can dental implants.   DM:   Last A1c:   Lab Results   Component Value Date/Time    HBA1C 8.6 (A) 08/10/2023 03:29 PM      Previous A1c was 8.7 on 3/29/23  A1C GOAL: < 7    Diabetes Medications:   Novolog 3 unit base plus 1:50 over 150 correction scale.   Semglee  15 units daily (states sometimes he forgets if he falls asleep)      Exercise: sporadic irregular exercise, <half hour walking weekly  Diet: \"unhealthy\" diet in general  Patient's body mass index is 19.08 kg/m². Exercise and nutrition counseling were performed at this visit.    Glucose monitoring frequency:  using Nicholas CGM, results in media.     Hypoglycemic episodes: yes - rare  Last Retinal Exam: on file and up-to-date    Foot Exam:  Monofilament: current   Increase Semglee to 17 units daily   "

## 2023-08-10 NOTE — PROGRESS NOTES
CHIEF COMPLAINT: Patient is here for follow up of Type 1 Diabetes Mellitus    HPI:     Celso Rose is a 58 y.o. male with Type 1 Diabetes Mellitus here for follow up.    Labs from 8/10/2023 show a1c is 8.6%  Labs from 3/29/2023 show A1c was 8.7%  Labs from 9/14/2022 show A1c was 8.0%    He was previously seen by the 2 nurse practitioners and has requested to switch to me  He has uncontrolled type 1 diabetes diagnosed 22 years ago.    He has been wearing a freestyle nicholas 2 CGM for over 6 months  He has a medical history of Bipolar disorder   He has a history of noncompliance with his oral diabetes medications  He is currently not on any medications for his bipolar disorder          Currently he is taking:  Semglee 15u daily  NovoLog 3 units per meal  Correction scale 1:50> 150    He admits to missing his semglee shots    Download of his Nicholas CGM shows an average BG of 175 (180) with TIR of 54%(50%)        He does not have diabetic kidney disease  U ACR was less than 30 on March 7, 2023        He has hyperlipidemia and is on Lipitor 10 mg daily  He admits to noncompliance with his medications  He does not have a history of cardiovascular disease or peripheral vascular disease  LDL cholesterol was 125 on March 2023      He had an eye exam in October 19, 2022 at [facility] showing evidence of diabetic retinopathy details were not given          BG Diary:03/29/23  CGM was downloaded and reviewed     Weight has been stable    Diabetes Complications   Retinopathy: Known retinopathy.  Last eye exam: October 19, 2022  Neuropathy: Denies paresthesias or numbness in hands or feet. Denies any foot wounds.  Exercise: Minimal.  Diet: Fair.  Patient's medications, allergies, and social histories were reviewed and updated as appropriate.    ROS:     CONS:     No fever, no chills   EYES:     No diplopia, no blurry vision   CV:           No chest pain, no palpitations   PULM:     No SOB, no cough, no hemoptysis.    GI:            No nausea, no vomiting, no diarrhea, no constipation   ENDO:     No polyuria, no polydipsia, no heat intolerance, no cold intolerance       Past Medical History:  Problem List:  2023: Attention and concentration deficit  2023: ALLI (generalized anxiety disorder)  2023: Long-term insulin use ([facility])  2023: Uncontrolled type 1 diabetes mellitus with hyperglycemia   ([facility])  2022: Chronic diarrhea  2021: Dyslipidemia  2021: Chronic pain of right knee  2021: Bilateral shoulder pain  2021: Anxiety  2020: IBS (irritable bowel syndrome)  2020: Diabetes mellitus type I ([facility])  2020: Otalgia of both ears  2019: Cannabis use disorder, moderate, dependence ([facility])  2018: Bipolar 2 disorder, major depressive episode ([facility])  2018: Tobacco abuse  2018: Marijuana use  2018: Uncontrolled type 2 diabetes mellitus without complication,   with long-term current use of insulin  2018: Marijuana abuse in remission  2018: Bipolar disorder, current episode mixed, moderate ([facility])      Past Surgical History:  Past Surgical History:   Procedure Laterality Date    KNEE ARTHROSCOPY      SHOULDER ARTHROSCOPY          Allergies:  Patient has no known allergies.     Social History:  Social History     Tobacco Use    Smoking status: Former     Packs/day: 1.00     Years: 1.00     Pack years: 1.00     Types: Cigarettes     Quit date: 2021     Years since quittin.3    Smokeless tobacco: Never    Tobacco comments:     started smoking at age 14 quit for a couple months then started    Vaping Use    Vaping Use: Never used   Substance Use Topics    Alcohol use: No    Drug use: Yes     Frequency: 7.0 times per week     Types: Marijuana, Inhaled        Family History:   family history includes ADD / ADHD in his sister; Alcohol/Drug in his sister; Diabetes in his father; Drug abuse in his sister; Heart Disease in his mother; Hypertension in his sister; Other in his father.      PHYSICAL  "EXAM:   OBJECTIVE:  Vital signs: /66 (BP Location: Left arm, Patient Position: Sitting)   Pulse 95   Ht 1.753 m (5' 9\")   Wt 58.6 kg (129 lb 3.2 oz)   SpO2 100%   BMI 19.08 kg/m²   GENERAL: Well-developed, well-nourished in no apparent distress.   EYE:  No ocular asymmetry, PERRLA  HENT: Pink, moist mucous membranes.    NECK: No thyromegaly.   CARDIOVASCULAR:  No murmurs  LUNGS: Clear breath sounds  ABDOMEN: Soft, nontender   EXTREMITIES: No clubbing, cyanosis, or edema.   NEUROLOGICAL: No gross focal motor abnormalities   LYMPH: No cervical adenopathy palpated.   SKIN: No rashes, lesions.     Labs:  Lab Results   Component Value Date/Time    HBA1C 8.6 (A) 08/10/2023 03:29 PM        Lab Results   Component Value Date/Time    WBC 7.4 02/13/2023 07:59 AM    RBC 4.66 (L) 02/13/2023 07:59 AM    HEMOGLOBIN 15.8 02/13/2023 07:59 AM    MCV 98.9 (H) 02/13/2023 07:59 AM    MCH 33.9 (H) 02/13/2023 07:59 AM    MCHC 34.3 02/13/2023 07:59 AM    RDW 48.4 02/13/2023 07:59 AM    MPV 11.2 02/13/2023 07:59 AM       Lab Results   Component Value Date/Time    SODIUM 138 03/07/2023 07:53 AM    POTASSIUM 4.2 03/07/2023 07:53 AM    CHLORIDE 106 03/07/2023 07:53 AM    CO2 23 03/07/2023 07:53 AM    ANION 9.0 03/07/2023 07:53 AM    GLUCOSE 127 (H) 03/07/2023 07:53 AM    BUN 11 03/07/2023 07:53 AM    CREATININE 0.88 03/07/2023 07:53 AM    CALCIUM 9.6 03/07/2023 07:53 AM    ASTSGOT 15 03/07/2023 07:53 AM    ALTSGPT 17 03/07/2023 07:53 AM    TBILIRUBIN 0.4 03/07/2023 07:53 AM    ALBUMIN 4.4 03/07/2023 07:53 AM    TOTPROTEIN 7.0 03/07/2023 07:53 AM    GLOBULIN 2.6 03/07/2023 07:53 AM    AGRATIO 1.7 03/07/2023 07:53 AM       Lab Results   Component Value Date/Time    CHOLSTRLTOT 181 03/07/2023 0753    TRIGLYCERIDE 80 03/07/2023 0753    HDL 40 03/07/2023 0753     (H) 03/07/2023 0753       Lab Results   Component Value Date/Time    MALBCRT see below 03/07/2023 07:53 AM    MICROALBUR <1.2 03/07/2023 07:53 AM        Lab Results "   Component Value Date/Time    TSHULTDENZEL 1.060 03/07/2023 0753     No results found for: FREEDIR  No results found for: FREET3  No results found for: THYSTIMIG        ASSESSMENT/PLAN:     1. Uncontrolled type 1 diabetes mellitus with hyperglycemia (HCC)  Uncontrolled secondary to hyperglycemia and noncompliance  CGM was downloaded and reviewed  Increase Semglee to 17u daily  I want him to take NovoLog 3 units with each meal fixed doses  Correction scale of 1 for every 50 above 150 ( consider changing this to 1:30> 130)  He is up-to-date with his labs  See Magdalena in 3 months    2. Dyslipidemia  Stable  Recommend better compliance with atorvastatin  Repeat fasting lipids in 12 months    3. Vitamin D deficiency  Uncontrolled  I want him to take vitamin D3 5000 IU daily  Repeat calcium vitamin D in 3 months    4. Long-term insulin use (HCC)  Patient is on long-term insulin for type 1 diabetes      Return in about 3 months (around 11/10/2023).    Total time spent on day of service was over 60 minutes which included obtaining a detailed history and physical exam, ordering labs, coordinating care and scheduling future follow-up    Thank you kindly for allowing me to participate in the diabetes care plan for this patient.    Brandon Graves MD, FACE, ECNU      CC:   Vincent Ding M.D.

## 2023-08-15 DIAGNOSIS — E55.9 VITAMIN D INSUFFICIENCY: ICD-10-CM

## 2023-08-15 RX ORDER — ERGOCALCIFEROL 1.25 MG/1
50000 CAPSULE ORAL
Qty: 12 CAPSULE | Refills: 3 | Status: SHIPPED | OUTPATIENT
Start: 2023-08-15

## 2023-08-16 DIAGNOSIS — E10.65 UNCONTROLLED TYPE 1 DIABETES MELLITUS WITH HYPERGLYCEMIA (HCC): ICD-10-CM

## 2023-08-16 RX ORDER — INSULIN GLARGINE 300 U/ML
20 INJECTION, SOLUTION SUBCUTANEOUS DAILY
Qty: 4.5 ML | Refills: 11 | Status: SHIPPED | OUTPATIENT
Start: 2023-08-16 | End: 2023-08-17

## 2023-08-17 ENCOUNTER — TELEPHONE (OUTPATIENT)
Dept: ENDOCRINOLOGY | Facility: MEDICAL CENTER | Age: 58
End: 2023-08-17
Payer: MEDICARE

## 2023-08-17 ENCOUNTER — TELEPHONE (OUTPATIENT)
Dept: PHARMACY | Facility: MEDICAL CENTER | Age: 58
End: 2023-08-17
Payer: MEDICARE

## 2023-08-17 DIAGNOSIS — E10.65 UNCONTROLLED TYPE 1 DIABETES MELLITUS WITH HYPERGLYCEMIA (HCC): ICD-10-CM

## 2023-08-17 RX ORDER — INSULIN GLARGINE-YFGN 100 [IU]/ML
20 INJECTION, SOLUTION SUBCUTANEOUS DAILY
Qty: 15 ML | Refills: 11 | Status: SHIPPED | OUTPATIENT
Start: 2023-08-17

## 2023-08-17 NOTE — TELEPHONE ENCOUNTER
Patient called to talk to a representative to discuss his medication changes/issues with semglee to toujeo per Rx coordinator in office and Dr. Graves' encounter notes. Patient was forwarded to Rx coordinator Nathalie Chavis

## 2023-08-17 NOTE — TELEPHONE ENCOUNTER
Spoke with patient regarding Toujeo SoloStar 300 UNIT/ML Solution Pen-injector 4.5ml 22 day supply $35.00 copay  I offer our Care Model services he was not aware of the change he stated he saw Dr. Graves om 08/10/23 and doctor did not mention anything on prescribing Toujeo I took up Dr. Graves Chart note and it looks like pharmacy send doctor message asking for replacement  on the Semglee doctor wrote on this note Replacing Semglee with Toujeo due to insurance coverage I explained  it to patient he was really confused so I have MAILE Fu talk to him she explained very to him he understood and ask to please send script to [facility] [address] [place] [address], [place] [facility] [id number] [facility]    Nathalie Eubanks, Pharmacy Liaison/ RX Coordinator

## 2023-08-17 NOTE — PROGRESS NOTES
I got message from the pharmacist that semglee is not covered and Toujeo is preferred so I switched the patient to Toujeo but then the patient complained and apparently called [facility] and found out that Semglee is covered insulin switching back to Semglee

## 2023-08-23 ENCOUNTER — APPOINTMENT (OUTPATIENT)
Dept: BEHAVIORAL HEALTH | Facility: CLINIC | Age: 58
End: 2023-08-23
Payer: MEDICARE

## 2023-10-23 ENCOUNTER — HOSPITAL ENCOUNTER (OUTPATIENT)
Dept: LAB | Facility: MEDICAL CENTER | Age: 58
End: 2023-10-23
Attending: INTERNAL MEDICINE
Payer: MEDICARE

## 2023-10-23 DIAGNOSIS — E78.5 DYSLIPIDEMIA: ICD-10-CM

## 2023-10-23 DIAGNOSIS — Z79.4 LONG-TERM INSULIN USE (HCC): ICD-10-CM

## 2023-10-23 DIAGNOSIS — E55.9 VITAMIN D INSUFFICIENCY: ICD-10-CM

## 2023-10-23 DIAGNOSIS — E10.65 UNCONTROLLED TYPE 1 DIABETES MELLITUS WITH HYPERGLYCEMIA (HCC): ICD-10-CM

## 2023-10-23 LAB
25(OH)D3 SERPL-MCNC: 44 NG/ML (ref 30–100)
ALBUMIN SERPL BCP-MCNC: 4.2 G/DL (ref 3.2–4.9)
ALBUMIN/GLOB SERPL: 1.6 G/DL
ALP SERPL-CCNC: 88 U/L (ref 30–99)
ALT SERPL-CCNC: 11 U/L (ref 2–50)
ANION GAP SERPL CALC-SCNC: 11 MMOL/L (ref 7–16)
AST SERPL-CCNC: 18 U/L (ref 12–45)
BILIRUB SERPL-MCNC: 0.5 MG/DL (ref 0.1–1.5)
BUN SERPL-MCNC: 8 MG/DL (ref 8–22)
CALCIUM ALBUM COR SERPL-MCNC: 9.1 MG/DL (ref 8.5–10.5)
CALCIUM SERPL-MCNC: 9.3 MG/DL (ref 8.5–10.5)
CHLORIDE SERPL-SCNC: 108 MMOL/L (ref 96–112)
CO2 SERPL-SCNC: 22 MMOL/L (ref 20–33)
CREAT SERPL-MCNC: 0.79 MG/DL (ref 0.5–1.4)
GFR SERPLBLD CREATININE-BSD FMLA CKD-EPI: 102 ML/MIN/1.73 M 2
GLOBULIN SER CALC-MCNC: 2.6 G/DL (ref 1.9–3.5)
GLUCOSE SERPL-MCNC: 111 MG/DL (ref 65–99)
POTASSIUM SERPL-SCNC: 3.7 MMOL/L (ref 3.6–5.5)
PROT SERPL-MCNC: 6.8 G/DL (ref 6–8.2)
SODIUM SERPL-SCNC: 141 MMOL/L (ref 135–145)

## 2023-10-23 PROCEDURE — 82306 VITAMIN D 25 HYDROXY: CPT

## 2023-10-23 PROCEDURE — 36415 COLL VENOUS BLD VENIPUNCTURE: CPT

## 2023-10-23 PROCEDURE — 80053 COMPREHEN METABOLIC PANEL: CPT

## 2023-11-06 ENCOUNTER — NON-PROVIDER VISIT (OUTPATIENT)
Dept: ENDOCRINOLOGY | Facility: MEDICAL CENTER | Age: 58
End: 2023-11-06
Attending: INTERNAL MEDICINE
Payer: MEDICARE

## 2023-11-06 VITALS
HEART RATE: 88 BPM | HEIGHT: 69 IN | DIASTOLIC BLOOD PRESSURE: 60 MMHG | BODY MASS INDEX: 18.96 KG/M2 | SYSTOLIC BLOOD PRESSURE: 104 MMHG | WEIGHT: 128 LBS | OXYGEN SATURATION: 95 %

## 2023-11-06 DIAGNOSIS — E78.5 DYSLIPIDEMIA: ICD-10-CM

## 2023-11-06 DIAGNOSIS — E10.65 UNCONTROLLED TYPE 1 DIABETES MELLITUS WITH HYPERGLYCEMIA (HCC): ICD-10-CM

## 2023-11-06 DIAGNOSIS — E55.9 VITAMIN D INSUFFICIENCY: ICD-10-CM

## 2023-11-06 LAB — HBA1C MFR BLD: 8.3 % (ref ?–5.8)

## 2023-11-06 PROCEDURE — 99213 OFFICE O/P EST LOW 20 MIN: CPT | Performed by: INTERNAL MEDICINE

## 2023-11-06 ASSESSMENT — FIBROSIS 4 INDEX: FIB4 SCORE: 1.63

## 2023-11-06 NOTE — PROGRESS NOTES
RN-CDE Note    Subjective:   Endocrinology Clinic Progress Note  PCP: Vincent Ding M.D.    HPI:  Celso Rose is a 58 y.o. old patient who is seen today by the Diabetes Nurse Specialist for review of Type 1 Diabetes.  Recent changes in health: He started back smoking again in September due to not feeling better.  He states he has been taking all his medications and trying to take better care of himself.  DM:   Last A1c:   Lab Results   Component Value Date/Time    HBA1C 8.3 (A) 11/06/2023 12:00 AM      Previous A1c was 8.6 on 8/10/23  A1C GOAL: < 7    Diabetes Medications:   Semglee 12-15 units daily  Novolog 3 units plus 1:30>130      Exercise: Walking dog daily  Diet: Sausage and eggs.  Unable to count carbohydrates  Lunch is cheese or tuna and crackers  Patient's body mass index is 18.9 kg/m². Exercise and nutrition counseling were performed at this visit.    Glucose monitoring frequency: See Nicholas download    Hypoglycemic episodes: yes - during the night or if gives more then 3 units of Novolog  Last Retinal Exam:  10/6/22 with Dr. Spear .  Making an appointment and will send exam  Daily Foot Exam: Yes   Foot Exam:  Monofilament: done  Monofilament testing with a 10 gram force: sensation intact: intact bilaterally  Visual Inspection: Feet without maceration, ulcers, fissures.  Pedal pulses: intact bilaterally   Lab Results   Component Value Date/Time    MALBCRT see below 03/07/2023 07:53 AM    MICROALBUR <1.2 03/07/2023 07:53 AM        ACR Albumin/Creatinine Ratio goal <30     HTN:   Blood pressure goal <130/<80 .   Currently Rx ACE/ARB: Yes     Dyslipidemia:    Lab Results   Component Value Date/Time    CHOLSTRLTOT 181 03/07/2023 07:53 AM     (H) 03/07/2023 07:53 AM    HDL 40 03/07/2023 07:53 AM    TRIGLYCERIDE 80 03/07/2023 07:53 AM         Currently Rx Statin: Yes     He  reports that he quit smoking about 2 years ago. His smoking use included cigarettes. He started smoking about 3 years ago.  He has a 1.0 pack-year smoking history. He has never used smokeless tobacco.      Plan:     Discussed and educated on:   - All medications, side effects and compliance (discussed carefully)  - Annual eye examinations at Ophthalmology  - Home glucose monitoring emphasized  - Weight control and daily exercise    Recommended medication changes: He is trying to do a 1 unit:15 gram carbohydrate ratio  plus his 1:50/150.  He will follow up with Dr. Graves in 6 months.

## 2023-11-15 ENCOUNTER — TELEPHONE (OUTPATIENT)
Dept: HEALTH INFORMATION MANAGEMENT | Facility: OTHER | Age: 58
End: 2023-11-15
Payer: MEDICARE

## 2023-11-15 NOTE — TELEPHONE ENCOUNTER
Xiao Espana, RPH  [facility]  Please schedule this patient with PCP prior to the end of the year to discuss use of statin to reduce CV risk in DM.  Please type discuss use of statin to reduce CV risk in DM in the appointment notes.

## 2024-01-02 ENCOUNTER — OFFICE VISIT (OUTPATIENT)
Dept: BEHAVIORAL HEALTH | Facility: CLINIC | Age: 59
End: 2024-01-02
Payer: MEDICARE

## 2024-01-02 DIAGNOSIS — F31.81 BIPOLAR 2 DISORDER, MAJOR DEPRESSIVE EPISODE (HCC): ICD-10-CM

## 2024-01-02 DIAGNOSIS — F41.1 GENERALIZED ANXIETY DISORDER: ICD-10-CM

## 2024-01-02 PROCEDURE — 90834 PSYTX W PT 45 MINUTES: CPT | Performed by: MARRIAGE & FAMILY THERAPIST

## 2024-01-02 NOTE — PROGRESS NOTES
[facility]   Psychotherapy Progress Note    Therapy was provided on this date in coordination with the [facility] approved Clinical Supervisor under the direct supervision of Dr. Christiano Argueta who was on site during this visit.     Name: Celso Rose  MRN: [id number]  : 1965  Age: 58 y.o.  Date of assessment: 2024  PCP: Vincent Ding M.D.  Persons in attendance: Patient  Total session time: 50 minutes      Topics addressed in psychotherapy include: Scott reports that he is having a manic episode and that it was triggered by a combination of events relating to having a granddaughter born recently by his estranged daughter, Isabelle, and his grandmother being hospitalized several days ago, and when he and his father went to visit his 100 y.o. granddaughter in the hospital, they were told they couln't be there and were escorted out.  Scott has concerns about a man that has been taking care of his grandmother and that he may have power of  over her.  Scott's primary concern is to make sure that his grandmother is in good health and currently he is having trouble getting any information on her status. Intervention: We did a grounding breathing exercise to start the session and then supportive psychotherapy provided.     Objective Observations:   Participation:Active verbal participation, Attentive, Engaged, and Open to feedback   Grooming:Casual and Neat   Cognition:Alert and Fully Oriented   Eye Contact:Good   Mood:Euthymic   Affect:Flexible, Full range, Expansive, and Congruent with content   Thought Process:Logical and Goal-directed   Speech:Rate within normal limits and Volume within normal limits    Current Risk:   Suicide: low   Homicide: low   Self-Harm: low     Care Plan Updated: Yes    Does patient express agreement with the above plan? Yes     Diagnosis:  1. Bipolar 2 disorder, major depressive episode (HCC)    2. Generalized anxiety disorder        Referral appointment(s) scheduled?  No       SABINE Tilley Intern

## 2024-03-07 ENCOUNTER — OFFICE VISIT (OUTPATIENT)
Dept: BEHAVIORAL HEALTH | Facility: CLINIC | Age: 59
End: 2024-03-07
Payer: MEDICARE

## 2024-03-07 VITALS — BODY MASS INDEX: 19.26 KG/M2 | WEIGHT: 130 LBS | HEIGHT: 69 IN

## 2024-03-07 DIAGNOSIS — F41.1 GAD (GENERALIZED ANXIETY DISORDER): ICD-10-CM

## 2024-03-07 DIAGNOSIS — R41.840 ATTENTION AND CONCENTRATION DEFICIT: ICD-10-CM

## 2024-03-07 DIAGNOSIS — F12.20 CANNABIS USE DISORDER, MODERATE, DEPENDENCE (HCC): ICD-10-CM

## 2024-03-07 PROBLEM — Z72.0 TOBACCO ABUSE: Chronic | Status: RESOLVED | Noted: 2018-07-18 | Resolved: 2024-03-07

## 2024-03-07 PROCEDURE — 99213 OFFICE O/P EST LOW 20 MIN: CPT | Performed by: PSYCHIATRY & NEUROLOGY

## 2024-03-07 ASSESSMENT — FIBROSIS 4 INDEX: FIB4 SCORE: 1.65
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
## 2024-03-07 NOTE — PROGRESS NOTES
PSYCHIATRY FOLLOW-UP NOTE    Chief Complaint   Patient presents with    Follow-Up     Anxiety, mood     History Of Present Illness:  Celso Rose is a 59 y.o. male with generalized anxiety disorder, cannabis use disorder, type 1 diabetes mellitus, dyslipidemia, IBS comes in today for follow up, was last seen over 9 months ago.  He has been doing all right in regards to his mental health, endorses sadness primarily related to being isolated and not having much contact with family.  He lives with his father but they do not get along well.  He sees his grandmother who also lives in town on a regular basis but she struggles with dementia and there can be difficult times with her.  He tried to reconnect with one of her daughters but it did not go well.  She misses having a relationship with his grandkids.  He tends to spend most of his time at home and does not have friends in town.  He does endorse struggles with anxiety but also does not like to be on medications.  He denies overwhelming struggles with sadness.  He denies any reckless or impulsive behaviors.  He does have difficulty staying focused on the task at hand but is able to manage it well.  His attention and concentration was a lot better compared to his initial appointment.  He denies having thoughts of wanting to hurt himself.    Social History:   He is single,  and  x 1, 2 daughters live in [place] but is estranged from both of them, lives with father in [place], grand mother lives in [place], on disability for mental health problems.     Substance Use:  Alcohol - Denies  Nicotine - Denies  Cannabis - Smokes daily  Illicit drugs - Denies    Past Medication Trials:  Prozac, Paxil x 20+ years, Remeron, Risperdal, Seroquel, Fanapt, Lamictal (s/e - rash), Trileptal      Medications:  Current Outpatient Medications   Medication Sig Dispense Refill    LANTUS SOLOSTAR 100 UNIT/ML Solution Pen-injector injection INJECT 30 UNITS SUBCUTANEOUSLY IN THE  "EVENING 15 mL 0    Insulin Glargine-yfgn (SEMGLEE, YFGN,) 100 UNIT/ML Solution Pen-injector Inject 20 Units under the skin every day. Replaces toujeo because apparently semglee is covered 15 mL 11    insulin aspart (NOVOLOG FLEXPEN RELION) 100 UNIT/ML injection PEN INJECT 10 UNITS SUBCUTANEOUSLY THREE TIMES DAILY BEFORE MEAL(S) 15 mL 3    vitamin D2, Ergocalciferol, (DRISDOL) 1.25 MG (50689 UT) Cap capsule Take 1 Capsule by mouth every 7 days. 12 Capsule 3    Continuous Blood Gluc Sensor (FREESTYLE SYED 2 SENSOR) Misc APPLY 1 SENSOR EVERY 14 DAYS 2 Each 10    Insulin Pen Needle 32 G x 4 mm (BD PEN NEEDLE ADA U/F) Using 1 pen needle with insulin injections 4 times a day 300 Each 11    atorvastatin (LIPITOR) 10 MG Tab Take 1 Tablet by mouth every evening. (Patient not taking: Reported on 3/7/2024) 90 Tablet 2    lisinopril (PRINIVIL) 2.5 MG Tab Take 1 Tablet by mouth every day. (Patient not taking: Reported on 3/7/2024) 90 Tablet 2    CONTOUR NEXT TEST strip USE STRIP TO CHECK GLUCOSE THREE TIMES DAILY AND AS NEEDED FOR SIGNS AND SYMPTOMS OR LOW SUGAR      Blood Glucose Test Strips Test strips order: Test strips for 1Cast Contour Next meter. Sig: use 3 times daily and prn ssx high or low sugar 100 Each 3     No current facility-administered medications for this visit.     Review Of Systems:    Constitutional - Positive for fatigue  Psychiatric - Positive for anxiety, poor sleep    Physical Examination:  Vital signs: Ht 1.753 m (5' 9\")   Wt 59 kg (130 lb)   BMI 19.20 kg/m²     Musculoskeletal: Normal gait. No abnormal movements.     Mental Status Evaluation:   General: Middle aged male, dressed in casual attire, good grooming and hygiene, in no apparent distress, calm and cooperative, good eye contact, no psychomotor agitation or retardation  Orientation: Alert and oriented to person, place and time  Recent and remote memory: Grossly intact  Attention span and concentration: Grossly intact  Speech: Spontaneous, " "normal rate, rhythm and tone  Thought Process: Linear, logical and goal directed  Thought Content: Denies suicidal or homicidal ideations, intent or plan  Perception: Denies auditory or visual hallucinations. No delusions noted  Associations: Intact  Language: Appropriate  Fund of knowledge and vocabulary: Grossly adequate  Mood: \"alright\"  Affect: Anxious at times, mood congruent  Insight: Good  Judgment: Good    Depression screenin/14/2022     9:30 AM 2023     8:00 AM 2023     2:00 PM   Depression Screen (PHQ-2/PHQ-9)   PHQ-2 Total Score 0 6 2   PHQ-9 Total Score  21 11     Interpretation of PHQ-9 Total Score   Score Severity   1-4 No Depression   5-9 Mild Depression   10-14 Moderate Depression   15-19 Moderately Severe Depression   20-27 Severe Depression    Anxiety screenin/24/2023    11:58 AM   ALLI 7   ALLI-7 Total Score 14     Interpretation of ALLI 7 Total Score   Score Severity:  0-4 No Anxiety   5-9 Mild Anxiety  10-14 Moderate Anxiety  15-21 Severe Anxiety    Medical Records/Labs/Diagnostic Tests Reviewed:  [place] PDMP records - no prescribed controlled medication in the last 2 years      Impression:  1.  Generalized anxiety disorder - stable  2.  Cannabis use disorder, moderate - stable  3.  Attention and concentration deficit - stable  4.  History of bipolar mood disorder    Plan:  1.  Continue to monitor focus mood and anxiety without medications.  He does have some mild symptoms but is able to manage them well.  He is also not particularly interested in taking day-to-day medications.  2.  He continues to use cannabis on a daily basis and does not want to cut back  3.  Continue to visual psychotherapy with SABINE Miramontes intern    Return to clinic if symptoms worsen    The proposed treatment plan was discussed with the patient who was provided the opportunity to ask questions and make suggestions regarding alternative treatment. Patient verbalized understanding and " expressed agreement with the plan.     Total time spent on the day of encounter: 25 minutes. Talking to patient, discussing symptoms and treatment plan, chart completion     Eli Knott M.D.  03/07/24    This note was created using voice recognition software (Dragon). The accuracy of the dictation is limited by the abilities of the software. I have reviewed the note prior to signing, however some errors in grammar and context are still possible. If you have any questions related to this note please do not hesitate to contact our office.

## 2024-04-25 ENCOUNTER — HOSPITAL ENCOUNTER (OUTPATIENT)
Dept: LAB | Facility: MEDICAL CENTER | Age: 59
End: 2024-04-25
Attending: INTERNAL MEDICINE
Payer: MEDICARE

## 2024-04-25 DIAGNOSIS — E55.9 VITAMIN D INSUFFICIENCY: ICD-10-CM

## 2024-04-25 DIAGNOSIS — E78.5 DYSLIPIDEMIA: ICD-10-CM

## 2024-04-25 DIAGNOSIS — E10.65 UNCONTROLLED TYPE 1 DIABETES MELLITUS WITH HYPERGLYCEMIA (HCC): ICD-10-CM

## 2024-04-25 LAB
25(OH)D3 SERPL-MCNC: 60 NG/ML (ref 30–100)
ALBUMIN SERPL BCP-MCNC: 4.4 G/DL (ref 3.2–4.9)
ALBUMIN/GLOB SERPL: 1.8 G/DL
ALP SERPL-CCNC: 92 U/L (ref 30–99)
ALT SERPL-CCNC: 8 U/L (ref 2–50)
ANION GAP SERPL CALC-SCNC: 13 MMOL/L (ref 7–16)
AST SERPL-CCNC: 17 U/L (ref 12–45)
BILIRUB SERPL-MCNC: 0.4 MG/DL (ref 0.1–1.5)
BUN SERPL-MCNC: 9 MG/DL (ref 8–22)
CALCIUM ALBUM COR SERPL-MCNC: 8.7 MG/DL (ref 8.5–10.5)
CALCIUM SERPL-MCNC: 9 MG/DL (ref 8.5–10.5)
CHLORIDE SERPL-SCNC: 106 MMOL/L (ref 96–112)
CHOLEST SERPL-MCNC: 179 MG/DL (ref 100–199)
CO2 SERPL-SCNC: 20 MMOL/L (ref 20–33)
CREAT SERPL-MCNC: 0.91 MG/DL (ref 0.5–1.4)
CREAT UR-MCNC: 349.81 MG/DL
FASTING STATUS PATIENT QL REPORTED: NORMAL
GFR SERPLBLD CREATININE-BSD FMLA CKD-EPI: 96 ML/MIN/1.73 M 2
GLOBULIN SER CALC-MCNC: 2.5 G/DL (ref 1.9–3.5)
GLUCOSE SERPL-MCNC: 119 MG/DL (ref 65–99)
HDLC SERPL-MCNC: 44 MG/DL
LDLC SERPL CALC-MCNC: 122 MG/DL
MICROALBUMIN UR-MCNC: 2.4 MG/DL
MICROALBUMIN/CREAT UR: 7 MG/G (ref 0–30)
POTASSIUM SERPL-SCNC: 3.9 MMOL/L (ref 3.6–5.5)
PROT SERPL-MCNC: 6.9 G/DL (ref 6–8.2)
SODIUM SERPL-SCNC: 139 MMOL/L (ref 135–145)
T3FREE SERPL-MCNC: 3.24 PG/ML (ref 2–4.4)
T4 FREE SERPL-MCNC: 1.16 NG/DL (ref 0.93–1.7)
TRIGL SERPL-MCNC: 63 MG/DL (ref 0–149)
TSH SERPL DL<=0.005 MIU/L-ACNC: 1.09 UIU/ML (ref 0.38–5.33)

## 2024-04-25 PROCEDURE — 80053 COMPREHEN METABOLIC PANEL: CPT

## 2024-04-25 PROCEDURE — 84481 FREE ASSAY (FT-3): CPT

## 2024-04-25 PROCEDURE — 84439 ASSAY OF FREE THYROXINE: CPT

## 2024-04-25 PROCEDURE — 84443 ASSAY THYROID STIM HORMONE: CPT

## 2024-04-25 PROCEDURE — 80061 LIPID PANEL: CPT

## 2024-04-25 PROCEDURE — 82306 VITAMIN D 25 HYDROXY: CPT

## 2024-04-25 PROCEDURE — 36415 COLL VENOUS BLD VENIPUNCTURE: CPT

## 2024-04-25 PROCEDURE — 82570 ASSAY OF URINE CREATININE: CPT

## 2024-04-25 PROCEDURE — 82043 UR ALBUMIN QUANTITATIVE: CPT

## 2024-05-03 ENCOUNTER — DOCUMENTATION (OUTPATIENT)
Dept: HEALTH INFORMATION MANAGEMENT | Facility: OTHER | Age: 59
End: 2024-05-03
Payer: MEDICARE

## 2024-05-08 NOTE — PROGRESS NOTES
RN-CDE Note    Subjective:   Endocrinology Clinic Progress Note  PCP: Vincent Ding M.D.    HPI:  Celso Rose is a 59 y.o. old patient who is seen today by the Diabetes Nurse Specialist for review of his uncontrolled type 1 diabetes.    Recent changes in health: denies any changes.   DM:   Last A1c:   Lab Results   Component Value Date/Time    HBA1C 8.8 (A) 05/09/2024 09:37 AM      Previous A1c was 8.3 on 11/6/2023  A1C GOAL: < 7    Diabetes Medications:   Semglee insulin 15 units per day  Novolog   using a 1:15 cho ratio.  Using a 150 over 150 correction ratio      Exercise: walking the dog, working in the yard.     Patient's body mass index is 19.4 kg/m². Exercise and nutrition counseling were performed at this visit.    Glucose monitoring frequency:  using Freestyle Nicholas          Hypoglycemic episodes:  on occasion.  Blood sugars less than 70 2% of the time in the past 2 weeks.   Last Retinal Exam:  request sent for records.     Foot Exam:  Monofilament: current.   Lab Results   Component Value Date/Time    MALBCRT 7 04/25/2024 07:46 AM    MICROALBUR 2.4 04/25/2024 07:46 AM        ACR Albumin/Creatinine Ratio goal <30     HTN:   Blood pressure goal <130/<80 .   Currently Rx ACE/ARB:  has a prescription but needs a refill.  States he has been out of them for the past 5-6 months.      Dyslipidemia:    Lab Results   Component Value Date/Time    CHOLSTRLTOT 179 04/25/2024 07:46 AM     (H) 04/25/2024 07:46 AM    HDL 44 04/25/2024 07:46 AM    TRIGLYCERIDE 63 04/25/2024 07:46 AM         Currently Rx Statin:  has not been taking for the past 5-6 months.  States that his prescription ran out.      He  reports that he quit smoking about 3 years ago. His smoking use included cigarettes. He started smoking about 4 years ago. He has a 1 pack-year smoking history. He has never used smokeless tobacco.      Plan:     Discussed and educated on:   - All medications, side effects and compliance (discussed  carefully)  - Annual eye examinations at Ophthalmology  - HbA1C: target  - Home glucose monitoring emphasized  - Weight control and daily exercise    Recommended medication changes: needs to increase his Lantus

## 2024-05-09 ENCOUNTER — OFFICE VISIT (OUTPATIENT)
Dept: ENDOCRINOLOGY | Facility: MEDICAL CENTER | Age: 59
End: 2024-05-09
Attending: INTERNAL MEDICINE
Payer: MEDICARE

## 2024-05-09 VITALS
BODY MASS INDEX: 19.46 KG/M2 | RESPIRATION RATE: 16 BRPM | SYSTOLIC BLOOD PRESSURE: 112 MMHG | HEART RATE: 103 BPM | DIASTOLIC BLOOD PRESSURE: 64 MMHG | HEIGHT: 69 IN | OXYGEN SATURATION: 96 % | WEIGHT: 131.4 LBS

## 2024-05-09 DIAGNOSIS — E78.5 DYSLIPIDEMIA: ICD-10-CM

## 2024-05-09 DIAGNOSIS — Z79.4 LONG-TERM INSULIN USE (HCC): ICD-10-CM

## 2024-05-09 DIAGNOSIS — E10.65 UNCONTROLLED TYPE 1 DIABETES MELLITUS WITH HYPERGLYCEMIA (HCC): ICD-10-CM

## 2024-05-09 DIAGNOSIS — E78.49 OTHER HYPERLIPIDEMIA: ICD-10-CM

## 2024-05-09 DIAGNOSIS — R80.9 MICROALBUMINURIA: ICD-10-CM

## 2024-05-09 DIAGNOSIS — E55.9 VITAMIN D INSUFFICIENCY: ICD-10-CM

## 2024-05-09 LAB
HBA1C MFR BLD: 8.8 % (ref ?–5.8)
POCT INT CON NEG: NEGATIVE
POCT INT CON POS: POSITIVE

## 2024-05-09 PROCEDURE — 3074F SYST BP LT 130 MM HG: CPT | Performed by: INTERNAL MEDICINE

## 2024-05-09 PROCEDURE — 3078F DIAST BP <80 MM HG: CPT | Performed by: INTERNAL MEDICINE

## 2024-05-09 PROCEDURE — 95251 CONT GLUC MNTR ANALYSIS I&R: CPT | Performed by: INTERNAL MEDICINE

## 2024-05-09 PROCEDURE — 99214 OFFICE O/P EST MOD 30 MIN: CPT | Performed by: INTERNAL MEDICINE

## 2024-05-09 RX ORDER — INSULIN ASPART 100 [IU]/ML
INJECTION, SOLUTION INTRAVENOUS; SUBCUTANEOUS
Qty: 15 ML | Refills: 3 | Status: SHIPPED | OUTPATIENT
Start: 2024-05-09

## 2024-05-09 RX ORDER — ATORVASTATIN CALCIUM 10 MG/1
10 TABLET, FILM COATED ORAL NIGHTLY
Qty: 90 TABLET | Refills: 2 | Status: SHIPPED | OUTPATIENT
Start: 2024-05-09 | End: 2024-05-09 | Stop reason: SDUPTHER

## 2024-05-09 RX ORDER — ATORVASTATIN CALCIUM 20 MG/1
20 TABLET, FILM COATED ORAL NIGHTLY
Qty: 90 TABLET | Refills: 3 | Status: SHIPPED | OUTPATIENT
Start: 2024-05-09

## 2024-05-09 RX ORDER — INSULIN GLARGINE 100 [IU]/ML
25 INJECTION, SOLUTION SUBCUTANEOUS EVERY EVENING
Qty: 15 ML | Refills: 6 | Status: SHIPPED | OUTPATIENT
Start: 2024-05-09

## 2024-05-09 RX ORDER — PEN NEEDLE, DIABETIC 32GX 5/32"
NEEDLE, DISPOSABLE MISCELLANEOUS
Qty: 300 EACH | Refills: 11 | Status: SHIPPED | OUTPATIENT
Start: 2024-05-09

## 2024-05-09 RX ORDER — ERGOCALCIFEROL 1.25 MG/1
50000 CAPSULE ORAL
Qty: 12 CAPSULE | Refills: 3 | Status: SHIPPED | OUTPATIENT
Start: 2024-05-09

## 2024-05-09 RX ORDER — LISINOPRIL 2.5 MG/1
2.5 TABLET ORAL DAILY
Qty: 90 TABLET | Refills: 2 | Status: SHIPPED | OUTPATIENT
Start: 2024-05-09

## 2024-05-09 ASSESSMENT — FIBROSIS 4 INDEX: FIB4 SCORE: 1.83

## 2024-05-09 NOTE — LETTER
[facility]  Vincent Ding M.D.  [address]  [place] [address]  Fax: [phone]   Authorization for Release/Disclosure of   Protected Health Information   Name: JOSE NI : 1965 SSN: [id number]   Address: [facility] [place] [address] Phone:    [phone] (home)    I authorize the entity listed below to release/disclose the PHI below to:   [facility]/Vincent Ding M.D. and Brandon Graves M.D.   Provider or Entity Name:  [facility]   Address   City, State, [facility]   Phone:      Fax:     Reason for request: continuity of care   Information to be released:    Retinal exam    [  ] Check here and initial the line next to each item to release ALL health information INCLUDING  _____ Care and treatment for drug and / or alcohol abuse  _____ HIV testing, infection status, or AIDS  _____ Genetic Testing    DATES OF SERVICE OR TIME PERIOD TO BE DISCLOSED: _____________  I understand and acknowledge that:  * This Authorization may be revoked at any time by you in writing, except if your health information has already been used or disclosed.  * Your health information that will be used or disclosed as a result of you signing this authorization could be re-disclosed by the recipient. If this occurs, your re-disclosed health information may no longer be protected by State or Federal laws.  * You may refuse to sign this Authorization. Your refusal will not affect your ability to obtain treatment.  * This Authorization becomes effective upon signing and will  on (date) __________.      If no date is indicated, this Authorization will  one (1) year from the signature date.    Name: Jose Ni  Signature:continued medical care Date:   2024     PLEASE FAX REQUESTED RECORDS BACK TO: [phone]

## 2024-05-09 NOTE — PROGRESS NOTES
CHIEF COMPLAINT: Patient is here for follow up of Type 1 Diabetes Mellitus    HPI:     Celso Rose is a 59 y.o. male with Type 1 Diabetes Mellitus here for follow up.    Labs from 5/9/2024 show A1c is 8.8%  Labs from 11/6/2023 show A1c is 8.3%  Labs from 8/10/2023 show A1c is 8.6%  Labs from 3/29/2023 show A1c was 8.7%  Labs from 9/14/2022 show A1c was 8.0%    He was previously seen by the 2 nurse practitioners and has requested to switch to me  He has uncontrolled type 1 diabetes diagnosed 22 years ago.      He has been wearing a freestyle nicholas 2 CGM for over 6 months  He has a medical history of Bipolar disorder   He is currently not on any medications for his bipolar disorder  He reports that he sees a psychiatrist          Currently he is taking:  Semglee 15u daily  NovoLog 3 units per meal  Correction scale 1:50> 150      He has dental caries which affects his sugars  He needs to have teeth pulled but he can't afford it at this time  He states he saw psychiatrist who told him that he does not need medications for his bipolar disorder.    Download of his Nicholas CGM shows an average BG of  175 (175 180) with TIR of 55% (54%50%)          He does not have diabetic kidney disease   Latest Reference Range & Units 04/25/24 07:46   Micro Alb Creat Ratio 0 - 30 mg/g 7   Creatinine, Urine mg/dL 349.81   Microalbumin, Urine Random mg/dL 2.4           He has hyperlipidemia and is on Lipitor 10 mg daily  He admits to noncompliance with his medications  He does not have a history of cardiovascular disease or peripheral vascular disease   Latest Reference Range & Units 04/25/24 07:46   Cholesterol,Tot 100 - 199 mg/dL 179   Triglycerides 0 - 149 mg/dL 63   HDL >=40 mg/dL 44   LDL <100 mg/dL 122 (H)         He is overdue for an eye exam           BG Diary:  CGM was downloaded and reviewed     Weight has been stable    Diabetes Complications   Retinopathy: Known retinopathy.  Last eye exam: POCT exam attempted    Neuropathy: Denies paresthesias or numbness in hands or feet. Denies any foot wounds.  Exercise: Minimal.  Diet: Fair.  Patient's medications, allergies, and social histories were reviewed and updated as appropriate.    ROS:     CONS:     No fever, no chills   EYES:     No diplopia, no blurry vision   CV:           No chest pain, no palpitations   PULM:     No SOB, no cough, no hemoptysis.   GI:            No nausea, no vomiting, no diarrhea, no constipation   ENDO:     No polyuria, no polydipsia, no heat intolerance, no cold intolerance       Past Medical History:  Problem List:  2023-05: Attention and concentration deficit  2023-05: ALLI (generalized anxiety disorder)  2023-03: Long-term insulin use ([facility])  2023-03: Uncontrolled type 1 diabetes mellitus with hyperglycemia   ([facility])  2022-02: Chronic diarrhea  2021-09: Dyslipidemia  2021-09: Chronic pain of right knee  2021-09: Bilateral shoulder pain  2021-09: Anxiety  2020-12: IBS (irritable bowel syndrome)  2020-12: Diabetes mellitus type I ([facility])  2020-12: Otalgia of both ears  2019-01: Cannabis use disorder, moderate, dependence ([facility])  2018-07: Bipolar 2 disorder, major depressive episode ([facility])  2018-07: Tobacco abuse  2018-04: Marijuana use  2018-01: Uncontrolled type 2 diabetes mellitus without complication,   with long-term current use of insulin  2018-01: Marijuana abuse in remission  2018-01: Bipolar disorder, current episode mixed, moderate ([facility])      Past Surgical History:  Past Surgical History:   Procedure Laterality Date    KNEE ARTHROSCOPY      SHOULDER ARTHROSCOPY          Allergies:  Patient has no known allergies.     Social History:  Social History     Tobacco Use    Smoking status: Former     Current packs/day: 0.00     Average packs/day: 1 pack/day for 1 year (1.0 ttl pk-yrs)     Types: Cigarettes     Start date: 4/1/2020     Quit date: 4/1/2021     Years since quitting: 3.1    Smokeless tobacco: Never    Tobacco comments:     started smoking at  "age 14 quit for a couple months then started    Vaping Use    Vaping Use: Never used   Substance Use Topics    Alcohol use: No    Drug use: Yes     Frequency: 7.0 times per week     Types: Marijuana, Inhaled     Comment: daily        Family History:   family history includes ADD / ADHD in his sister; Alcohol/Drug in his sister; Autism spectrum disorder in his grandson; Dementia in his paternal grandmother; Diabetes in his father; Drug abuse in his sister; Heart Disease in his mother; Hypertension in his sister; Other in his father.      PHYSICAL EXAM:   OBJECTIVE:  Vital signs: /64 (BP Location: Left arm, Patient Position: Sitting)   Pulse (!) 103   Resp 16   Ht 1.753 m (5' 9\")   Wt 59.6 kg (131 lb 6.4 oz)   SpO2 96%   BMI 19.40 kg/m²   GENERAL: Well-developed, well-nourished in no apparent distress.   EYE:  No ocular asymmetry, PERRLA  HENT: Pink, moist mucous membranes.    NECK: No thyromegaly.   CARDIOVASCULAR:  No murmurs  LUNGS: Clear breath sounds  ABDOMEN: Soft, nontender   EXTREMITIES: No clubbing, cyanosis, or edema.   NEUROLOGICAL: No gross focal motor abnormalities   LYMPH: No cervical adenopathy palpated.   SKIN: No rashes, lesions.     Labs:  Lab Results   Component Value Date/Time    HBA1C 8.8 (A) 05/09/2024 09:37 AM        Lab Results   Component Value Date/Time    WBC 7.4 02/13/2023 07:59 AM    RBC 4.66 (L) 02/13/2023 07:59 AM    HEMOGLOBIN 15.8 02/13/2023 07:59 AM    MCV 98.9 (H) 02/13/2023 07:59 AM    MCH 33.9 (H) 02/13/2023 07:59 AM    MCHC 34.3 02/13/2023 07:59 AM    RDW 48.4 02/13/2023 07:59 AM    MPV 11.2 02/13/2023 07:59 AM       Lab Results   Component Value Date/Time    SODIUM 139 04/25/2024 07:46 AM    POTASSIUM 3.9 04/25/2024 07:46 AM    CHLORIDE 106 04/25/2024 07:46 AM    CO2 20 04/25/2024 07:46 AM    ANION 13.0 04/25/2024 07:46 AM    GLUCOSE 119 (H) 04/25/2024 07:46 AM    BUN 9 04/25/2024 07:46 AM    CREATININE 0.91 04/25/2024 07:46 AM    CALCIUM 9.0 04/25/2024 07:46 AM    " ASTSGOT 17 04/25/2024 07:46 AM    ALTSGPT 8 04/25/2024 07:46 AM    TBILIRUBIN 0.4 04/25/2024 07:46 AM    ALBUMIN 4.4 04/25/2024 07:46 AM    TOTPROTEIN 6.9 04/25/2024 07:46 AM    GLOBULIN 2.5 04/25/2024 07:46 AM    AGRATIO 1.8 04/25/2024 07:46 AM       Lab Results   Component Value Date/Time    CHOLSTRLTOT 181 03/07/2023 0753    TRIGLYCERIDE 80 03/07/2023 0753    HDL 40 03/07/2023 0753     (H) 03/07/2023 0753       Lab Results   Component Value Date/Time    MALBCRT 7 04/25/2024 07:46 AM    MICROALBUR 2.4 04/25/2024 07:46 AM        Lab Results   Component Value Date/Time    TSHULTRASEN 1.060 03/07/2023 0753     No results found for: FREEDIR  No results found for: FREET3  No results found for: THYSTIMIG        ASSESSMENT/PLAN:     1. Uncontrolled type 1 diabetes mellitus with hyperglycemia (HCC)  Uncontrolled secondary to hyperglycemia   CGM was downloaded and reviewed  Increase Semglee to 17u daily  continue carb ratio 1:15 grams   Correction scale of 1 for every 50 above 150  He is up-to-date with his labs  See Magdalena in 3 months    2. Dyslipidemia  Uncontrolled  Increase dose to 20mg of atorvastatin  Recommend better compliance  Repeat fasting lipids in 12 months    3. Vitamin D deficiency  Controlled  Continue vitamin D  Repeat calcium vitamin D in 6 months    4. Long-term insulin use (HCC)  Patient is on long-term insulin for type 1 diabetes      Return in about 3 months (around 8/9/2024).        Thank you kindly for allowing me to participate in the diabetes care plan for this patient.    Brandon Graves MD, FACE, [facility], [facility]      CC:   Vincent Ding M.D.

## 2024-06-03 LAB — RETINAL SCREEN: NEGATIVE

## 2024-06-13 ENCOUNTER — HOSPITAL ENCOUNTER (OUTPATIENT)
Dept: RADIOLOGY | Facility: MEDICAL CENTER | Age: 59
End: 2024-06-13
Payer: MEDICARE

## 2024-06-13 ENCOUNTER — OFFICE VISIT (OUTPATIENT)
Dept: MEDICAL GROUP | Facility: PHYSICIAN GROUP | Age: 59
End: 2024-06-13
Payer: MEDICARE

## 2024-06-13 VITALS
WEIGHT: 124.4 LBS | OXYGEN SATURATION: 95 % | HEART RATE: 96 BPM | RESPIRATION RATE: 14 BRPM | HEIGHT: 69 IN | BODY MASS INDEX: 18.43 KG/M2 | TEMPERATURE: 98.7 F | DIASTOLIC BLOOD PRESSURE: 62 MMHG | SYSTOLIC BLOOD PRESSURE: 90 MMHG

## 2024-06-13 DIAGNOSIS — K52.9 CHRONIC DIARRHEA: ICD-10-CM

## 2024-06-13 DIAGNOSIS — R07.81 RIB PAIN ON LEFT SIDE: ICD-10-CM

## 2024-06-13 PROCEDURE — 3078F DIAST BP <80 MM HG: CPT

## 2024-06-13 PROCEDURE — 3074F SYST BP LT 130 MM HG: CPT

## 2024-06-13 PROCEDURE — 71111 X-RAY EXAM RIBS/CHEST4/> VWS: CPT

## 2024-06-13 PROCEDURE — 99213 OFFICE O/P EST LOW 20 MIN: CPT

## 2024-06-13 ASSESSMENT — FIBROSIS 4 INDEX: FIB4 SCORE: 1.83

## 2024-06-25 NOTE — PROGRESS NOTES
Verbal consent was acquired by the patient to use [employer] ambient listening note generation during this visit     Subjective:     HPI:   History of Present Illness  The patient is a 59-year-old male presenting to follow up for acute rib pain. He has a history of uncontrolled type 1 diabetes that is currently not well controlled.    The patient reports the onset of left-sided rib pain upon awakening, without any specific injury. He describes a sensation akin to something moving or catching in his rib, particularly when attempting to reach for objects, such as water bottles. This pain, which he describes as sharp and stabbing, is exacerbated by certain movements, such as rising from bed or moving incorrectly. Despite the pain, he notes that the pain is bearable when he is in a supine position. He has attempted to manage the pain with lidocaine patches, but these have not provided relief. The patient also reports generalized body aches, with the exception of his knees.    Supplemental Information  He has only one kidney. He has two digestive health issues. He was born diabetic and did not find out until 2020. He had a CAT scan done on his pancreas. He had angina yesterday. He has not had any angina in quite some time. He used to get it quite often. He had a stress test on the treadmill. He has not had any night sweats. He got dizzy yesterday when he stood up. He has not been eating well. He is constipated. He went from having chronic diarrhea to EPI. He was told years ago that he had a bad case of irritable bowel syndrome.   He smokes pot. He quit smoking.    [facility] Form    Last edited 06/24/24 17:48 PDT by DOUG Helm.         Assessment & Plan:     1. Rib pain on left side  [id number] (WITH 1-VIEW CXR)      2. Chronic diarrhea  Referral to Gastroenterology          Assessment & Plan  1. Left-sided rib pain.  A baseline view of the patient's ribs is recommended to rule out an occult fracture.  Given the patient's absence of shortness of breath and his lung auscultation reveals good airflow and air movement, there is less concern about a pulmonary etiology.    2. Chronic diarrhea.  The patient's condition is chronic and not at goal. The patient has expressed a desire for a referral to a gastroenterologist for further evaluation due to the persistence, which I believe is reasonable.    Health Maintenance: Completed    Objective:     Exam:  Objective:  Vitals:    06/13/24 1025   BP: 90/62   Pulse: 96   Resp: 14   Temp: 37.1 °C (98.7 °F)   SpO2: 95%     Physical Exam  Physical Exam  Pulmonary:      Breath sounds: Normal breath sounds and air entry.   Chest:      Chest wall: No deformity or crepitus.         Constitutional:       Appearance: Normal appearance.   Eyes:      Extraocular Movements: Extraocular movements intact.   Pulmonary:      Effort: Pulmonary effort is normal.   Neurological:      General: No focal deficit present.      Mental Status: She is alert and oriented to person, place, and time.   Psychiatric:         Mood and Affect: Mood normal.         Behavior: Behavior normal.       Results      Return if symptoms worsen or fail to improve.    Please note that this dictation was created using voice recognition software. I have made every reasonable attempt to correct obvious errors, but I expect that there are errors of grammar and possibly content that I did not discover before finalizing the note.

## 2024-07-30 ENCOUNTER — OFFICE VISIT (OUTPATIENT)
Dept: MEDICAL GROUP | Facility: PHYSICIAN GROUP | Age: 59
End: 2024-07-30
Payer: MEDICARE

## 2024-07-30 VITALS
SYSTOLIC BLOOD PRESSURE: 110 MMHG | HEIGHT: 69 IN | BODY MASS INDEX: 18.63 KG/M2 | HEART RATE: 96 BPM | DIASTOLIC BLOOD PRESSURE: 64 MMHG | TEMPERATURE: 97.9 F | OXYGEN SATURATION: 95 % | WEIGHT: 125.8 LBS

## 2024-07-30 DIAGNOSIS — Q61.4 DYSPLASIA OF ONE KIDNEY: ICD-10-CM

## 2024-07-30 DIAGNOSIS — I70.0 AORTIC ATHEROSCLEROSIS (HCC): ICD-10-CM

## 2024-07-30 DIAGNOSIS — Z23 NEED FOR VACCINATION: ICD-10-CM

## 2024-07-30 DIAGNOSIS — E55.9 VITAMIN D DEFICIENCY: ICD-10-CM

## 2024-07-30 DIAGNOSIS — F31.81 BIPOLAR 2 DISORDER, MAJOR DEPRESSIVE EPISODE (HCC): ICD-10-CM

## 2024-07-30 DIAGNOSIS — E10.59 HYPERTENSION ASSOCIATED WITH TYPE 1 DIABETES MELLITUS (HCC): ICD-10-CM

## 2024-07-30 DIAGNOSIS — Z12.11 COLON CANCER SCREENING: ICD-10-CM

## 2024-07-30 DIAGNOSIS — E10.65 TYPE 1 DIABETES MELLITUS WITH HYPERGLYCEMIA (HCC): ICD-10-CM

## 2024-07-30 DIAGNOSIS — I15.2 HYPERTENSION ASSOCIATED WITH TYPE 1 DIABETES MELLITUS (HCC): ICD-10-CM

## 2024-07-30 DIAGNOSIS — E78.5 DYSLIPIDEMIA: ICD-10-CM

## 2024-07-30 DIAGNOSIS — Z11.4 SCREENING FOR HIV (HUMAN IMMUNODEFICIENCY VIRUS): ICD-10-CM

## 2024-07-30 DIAGNOSIS — Z12.5 SCREENING FOR MALIGNANT NEOPLASM OF PROSTATE: ICD-10-CM

## 2024-07-30 PROBLEM — Z79.4 LONG-TERM INSULIN USE (HCC): Chronic | Status: ACTIVE | Noted: 2023-03-29

## 2024-07-30 PROBLEM — E10.9 DIABETES MELLITUS TYPE I (HCC): Chronic | Status: ACTIVE | Noted: 2020-12-04

## 2024-07-30 PROBLEM — E10.69 DYSLIPIDEMIA DUE TO TYPE 1 DIABETES MELLITUS (HCC): Chronic | Status: ACTIVE | Noted: 2021-09-15

## 2024-07-30 PROBLEM — E10.69 DYSLIPIDEMIA DUE TO TYPE 1 DIABETES MELLITUS (HCC): Status: ACTIVE | Noted: 2021-09-15

## 2024-07-30 ASSESSMENT — ENCOUNTER SYMPTOMS: GENERAL WELL-BEING: GOOD

## 2024-07-30 ASSESSMENT — PATIENT HEALTH QUESTIONNAIRE - PHQ9: CLINICAL INTERPRETATION OF PHQ2 SCORE: 0

## 2024-07-30 ASSESSMENT — FIBROSIS 4 INDEX: FIB4 SCORE: 1.83

## 2024-07-30 ASSESSMENT — ACTIVITIES OF DAILY LIVING (ADL): BATHING_REQUIRES_ASSISTANCE: 0

## 2024-07-31 ENCOUNTER — HOSPITAL ENCOUNTER (OUTPATIENT)
Dept: LAB | Facility: MEDICAL CENTER | Age: 59
End: 2024-07-31
Attending: INTERNAL MEDICINE
Payer: MEDICARE

## 2024-07-31 DIAGNOSIS — E10.65 TYPE 1 DIABETES MELLITUS WITH HYPERGLYCEMIA (HCC): ICD-10-CM

## 2024-07-31 DIAGNOSIS — E55.9 VITAMIN D DEFICIENCY: ICD-10-CM

## 2024-07-31 DIAGNOSIS — Z11.4 SCREENING FOR HIV (HUMAN IMMUNODEFICIENCY VIRUS): ICD-10-CM

## 2024-07-31 DIAGNOSIS — Z12.5 SCREENING FOR MALIGNANT NEOPLASM OF PROSTATE: ICD-10-CM

## 2024-07-31 DIAGNOSIS — E78.5 DYSLIPIDEMIA: ICD-10-CM

## 2024-07-31 DIAGNOSIS — I15.2 HYPERTENSION ASSOCIATED WITH TYPE 1 DIABETES MELLITUS (HCC): ICD-10-CM

## 2024-07-31 DIAGNOSIS — E10.59 HYPERTENSION ASSOCIATED WITH TYPE 1 DIABETES MELLITUS (HCC): ICD-10-CM

## 2024-07-31 LAB
25(OH)D3 SERPL-MCNC: 46 NG/ML (ref 30–100)
BASOPHILS # BLD AUTO: 0.5 % (ref 0–1.8)
BASOPHILS # BLD: 0.05 K/UL (ref 0–0.12)
EOSINOPHIL # BLD AUTO: 0.87 K/UL (ref 0–0.51)
EOSINOPHIL NFR BLD: 9.2 % (ref 0–6.9)
ERYTHROCYTE [DISTWIDTH] IN BLOOD BY AUTOMATED COUNT: 53.4 FL (ref 35.9–50)
EST. AVERAGE GLUCOSE BLD GHB EST-MCNC: 192 MG/DL
HBA1C MFR BLD: 8.3 % (ref 4–5.6)
HCT VFR BLD AUTO: 44.4 % (ref 42–52)
HGB BLD-MCNC: 15 G/DL (ref 14–18)
HIV 1+2 AB+HIV1 P24 AG SERPL QL IA: NORMAL
IMM GRANULOCYTES # BLD AUTO: 0.03 K/UL (ref 0–0.11)
IMM GRANULOCYTES NFR BLD AUTO: 0.3 % (ref 0–0.9)
LYMPHOCYTES # BLD AUTO: 3.71 K/UL (ref 1–4.8)
LYMPHOCYTES NFR BLD: 39.4 % (ref 22–41)
MCH RBC QN AUTO: 34.2 PG (ref 27–33)
MCHC RBC AUTO-ENTMCNC: 33.8 G/DL (ref 32.3–36.5)
MCV RBC AUTO: 101.4 FL (ref 81.4–97.8)
MONOCYTES # BLD AUTO: 0.63 K/UL (ref 0–0.85)
MONOCYTES NFR BLD AUTO: 6.7 % (ref 0–13.4)
NEUTROPHILS # BLD AUTO: 4.12 K/UL (ref 1.82–7.42)
NEUTROPHILS NFR BLD: 43.9 % (ref 44–72)
NRBC # BLD AUTO: 0 K/UL
NRBC BLD-RTO: 0 /100 WBC (ref 0–0.2)
PLATELET # BLD AUTO: 209 K/UL (ref 164–446)
PMV BLD AUTO: 10.8 FL (ref 9–12.9)
PSA SERPL-MCNC: 0.63 NG/ML (ref 0–4)
RBC # BLD AUTO: 4.38 M/UL (ref 4.7–6.1)
TSH SERPL-ACNC: 2.27 UIU/ML (ref 0.35–5.5)
WBC # BLD AUTO: 9.4 K/UL (ref 4.8–10.8)

## 2024-07-31 PROCEDURE — 83036 HEMOGLOBIN GLYCOSYLATED A1C: CPT

## 2024-07-31 PROCEDURE — 84460 ALANINE AMINO (ALT) (SGPT): CPT

## 2024-07-31 PROCEDURE — 85025 COMPLETE CBC W/AUTO DIFF WBC: CPT

## 2024-07-31 PROCEDURE — 84443 ASSAY THYROID STIM HORMONE: CPT

## 2024-07-31 PROCEDURE — 36415 COLL VENOUS BLD VENIPUNCTURE: CPT

## 2024-07-31 PROCEDURE — 84153 ASSAY OF PSA TOTAL: CPT

## 2024-07-31 PROCEDURE — 82043 UR ALBUMIN QUANTITATIVE: CPT

## 2024-07-31 PROCEDURE — 82570 ASSAY OF URINE CREATININE: CPT

## 2024-07-31 PROCEDURE — 80048 BASIC METABOLIC PNL TOTAL CA: CPT

## 2024-07-31 PROCEDURE — 87389 HIV-1 AG W/HIV-1&-2 AB AG IA: CPT

## 2024-07-31 PROCEDURE — 80061 LIPID PANEL: CPT

## 2024-07-31 PROCEDURE — 82306 VITAMIN D 25 HYDROXY: CPT

## 2024-08-01 LAB
ALT SERPL-CCNC: 15 U/L (ref 2–50)
ANION GAP SERPL CALC-SCNC: 10 MMOL/L (ref 7–16)
BUN SERPL-MCNC: 12 MG/DL (ref 8–22)
CALCIUM SERPL-MCNC: 9.5 MG/DL (ref 8.5–10.5)
CHLORIDE SERPL-SCNC: 106 MMOL/L (ref 96–112)
CHOLEST SERPL-MCNC: 175 MG/DL (ref 100–199)
CO2 SERPL-SCNC: 23 MMOL/L (ref 20–33)
CREAT SERPL-MCNC: 0.87 MG/DL (ref 0.5–1.4)
CREAT UR-MCNC: 154.25 MG/DL
GFR SERPLBLD CREATININE-BSD FMLA CKD-EPI: 98 ML/MIN/1.73 M 2
GLUCOSE SERPL-MCNC: 87 MG/DL (ref 65–99)
HDLC SERPL-MCNC: 45 MG/DL
LDLC SERPL CALC-MCNC: 117 MG/DL
MICROALBUMIN UR-MCNC: <1.2 MG/DL
MICROALBUMIN/CREAT UR: NORMAL MG/G (ref 0–30)
POTASSIUM SERPL-SCNC: 4 MMOL/L (ref 3.6–5.5)
SODIUM SERPL-SCNC: 139 MMOL/L (ref 135–145)
TRIGL SERPL-MCNC: 63 MG/DL (ref 0–149)

## 2024-08-03 RX ORDER — ATORVASTATIN CALCIUM 40 MG/1
40 TABLET, FILM COATED ORAL DAILY
Qty: 90 TABLET | Refills: 3 | Status: SHIPPED | OUTPATIENT
Start: 2024-08-03

## 2024-08-06 ENCOUNTER — NON-PROVIDER VISIT (OUTPATIENT)
Dept: ENDOCRINOLOGY | Facility: MEDICAL CENTER | Age: 59
End: 2024-08-06
Attending: INTERNAL MEDICINE
Payer: MEDICARE

## 2024-08-06 VITALS
HEART RATE: 88 BPM | DIASTOLIC BLOOD PRESSURE: 60 MMHG | WEIGHT: 122 LBS | OXYGEN SATURATION: 92 % | BODY MASS INDEX: 18.07 KG/M2 | SYSTOLIC BLOOD PRESSURE: 100 MMHG | HEIGHT: 69 IN

## 2024-08-06 DIAGNOSIS — E10.65 UNCONTROLLED TYPE 1 DIABETES MELLITUS WITH HYPERGLYCEMIA (HCC): ICD-10-CM

## 2024-08-06 PROCEDURE — 99212 OFFICE O/P EST SF 10 MIN: CPT | Mod: 25 | Performed by: INTERNAL MEDICINE

## 2024-08-06 PROCEDURE — 95249 CONT GLUC MNTR PT PROV EQP: CPT | Performed by: INTERNAL MEDICINE

## 2024-08-06 ASSESSMENT — FIBROSIS 4 INDEX: FIB4 SCORE: 1.26

## 2024-08-06 NOTE — PROGRESS NOTES
RN-CDE Note    Subjective:   Endocrinology Clinic Progress Note  PCP: Vincent Ding M.D.    HPI:  Celso Rose is a 59 y.o. old patient who is seen today by the Diabetes Nurse Specialist for review of Type 2 Diabetes.  Recent changes in health: States losing weight.    DM:   Last A1c:   Lab Results   Component Value Date/Time    HBA1C 8.3 (H) 07/31/2024 07:00 AM      Previous A1c was 8.8 on 5/9/24  A1C GOAL: < 7    Diabetes Medications:   Lantus 17 units daily  Novolog 1:15 plus 1:50>150      Exercise: Walking dog  Diet: No appetite.  Coffee with creamer and breakfast bowl.  Lunch is sandwich.  Dinner is T.V. dinners.  He will try eating more high calorie foods such as nuts and avocado to help him gain weight.  Patient's body mass index is 18.02 kg/m². Exercise and nutrition counseling were performed at this visit.    Glucose monitoring frequency: See Nicholas download    Hypoglycemic episodes: yes - after highs  Last Retinal Exam:  in office  Daily Foot Exam: Yes   Foot Exam:  Monofilament: done  Lab Results   Component Value Date/Time    MALBCRT see below 07/31/2024 07:00 AM    MICROALBUR <1.2 07/31/2024 07:00 AM        ACR Albumin/Creatinine Ratio goal <30     HTN:   Blood pressure goal <130/<80 .   Currently Rx ACE/ARB: Yes     Dyslipidemia:    Lab Results   Component Value Date/Time    CHOLSTRLTOT 175 07/31/2024 07:00 AM     (H) 07/31/2024 07:00 AM    HDL 45 07/31/2024 07:00 AM    TRIGLYCERIDE 63 07/31/2024 07:00 AM         Currently Rx Statin: Yes     He  reports that he quit smoking about 3 years ago. His smoking use included cigarettes. He started smoking about 4 years ago. He has a 1 pack-year smoking history. He has never used smokeless tobacco.      Plan:     Discussed and educated on:   - All medications, side effects and compliance (discussed carefully)  - Annual eye examinations at Ophthalmology  - Home glucose monitoring emphasized  - Weight control and daily exercise    Recommended  medication changes: He will take 4 units at lunch instead of  3 units.  Lantus 17 units daily.  He will follow up with Dr. Graves in 3 months.

## 2024-08-20 DIAGNOSIS — E10.65 UNCONTROLLED TYPE 1 DIABETES MELLITUS WITH HYPERGLYCEMIA (HCC): ICD-10-CM

## 2024-08-20 PROBLEM — D36.9 ADENOMATOUS POLYP: Status: ACTIVE | Noted: 2024-08-20

## 2024-09-23 ENCOUNTER — TELEPHONE (OUTPATIENT)
Dept: ENDOCRINOLOGY | Facility: MEDICAL CENTER | Age: 59
End: 2024-09-23
Payer: MEDICARE

## 2024-09-24 NOTE — TELEPHONE ENCOUNTER
Scott got a new Nicholas 2 reader through the [employer].  He states he couldn't get it connected to his phone but he will have us help him at his appointment coming up.

## 2024-11-01 NOTE — BH THERAPY
[facility]  Therapy Progress Note    Patient Name: Celso Rose  Patient MRN: [id number]  Today's Date: 10/8/2018     Type of session:Individual psychotherapy  Length of session: 45 minutes  Persons in attendance:Patient    Subjective/New Info: The patient ID/Chief Complaint:  The patient is a 53 year old male, , .  The patient referred by Louis Brennan A.P.N. and voluntarily presented for an individual intake to address chronic depressive and some hypomania events denies history of anabell.  Reviewed limits of confidentiality and [facility] policies; patient expressed understanding and agreed to voluntarily proceed with evaluation and treatment.    Interval History:   Session Focus: The patient's estimated global assessment of functioning suggests that the patient's behavior may be influenced by severe psychological difficulties and significant problems functioning in most areas of life (e.g., relationships, and work life). The patient's affective and emotional state appeared mildy depressed. The patient reports that he is now living with his father and his concerned about his interactions because in the past father has been very demanding causing a number of psychological concerns for the patient. Discussed with patient using problem-solving ways to effectively deal with negative thoughts about his interactions with his father and ways to increase his behavioral activation. Patient is also moving forward with looking to identify a new place to live including the possibility of buying a trailer home.     Therapeutic Intervention: Cognitive Behavioral Therapy      Planned Intervention: Review the result of psychological testing and adjust the treatment plan/ patient will complete daily mood charting    Objective/Observations:  Mental Status    Patient did not present in acute distress. Patient was appropriately groomed. Patient was alert and oriented x4. Eye  Patient is aware with so many different area symptoms , and hx of stressors , lack of sleep   Her symptoms can be due to anxiety . She sees a behavioral health therapist .   Not yet ready to start a med for anxiety   contact was appropriate. No abnormalities in attention or concentration were noted. No abnormalities of movement present; psychomotor activity was normal. Speech was fluent and regular in rhythm, rate, volume, and tone. Thought processes Linear, Logical and Goal Directed. There was no evidence of thought disorder. No auditory or visual hallucinations. Long and short term memory appeared to be intact. Insight, judgment, and impulse control were deemed to be fair.  Reported mood was “depressed.” Affect was full-ranging and appropriate to thought content and conversation.  Patient denied current suicidal and homicidal ideation in plan, intent, and preparatory behavior.    Psychometric Test Results:       BHM-20  Global Mental Health  Moderate Distress  Well Being Scale  Mild Distress  Symptoms Scale  Mild Distress  Anxiety Subscale  Mild Distress  Depression Subscale  Mild Distress  Alcohol/Drug Subscale Moderate Distress  Bipolar Subscale  Mild Distress  Eating Disorder Subscale Mild Distress  Harm to other Subscale Within Normal Limits  Suicide Monitoring Scale No Indication of Risk  Life Functioning Scale   Severe Distress          Diagnoses:   1. Bipolar disorder, current episode mixed, moderate (HCC)     Risk Assessment:      The patient denied any suicide-related ideation and/or behaviors and intent/plan, denied thoughts about death and dying both in session and during the period since last appointment, or past 2 weeks.  Current Risk and Protective Factors:  Psychiatric History and Current Status:    ?history of suicide attempt < 3 months;   ?history of suicide attempt   ?history of psychiatric inpatient care;   ?history of non-suicidal self-injurious behaviors;   ?depression or other mood disorders;   ?personality disorders or traits;   ?PTSD or anxiety disorders;   ?sleep disorders;   ?substance-use disorders;   ?family history of suicide - not applicable.;  ?family history of psychiatric illness;   ?psychotic  disorders.      Psychological Characteristics:     ?hopelessness;   ?belongingness;   ?perceived burdensomeness;   ?acquired capability for suicide;   ?impulsivity;   ?problem-solving deficits;   ?shame;   ?guilt.      Psychosocial Stressors:    ?relationship problems;   ?legal problems;   ?financial problems;   ?work related problems;   ?lack of social support.      Physical Injury or Illness:    ?TBI;   ?physical injury;   ?chronic pain;   ?other medical problems…  Other Risk Factors:    ?male;   ? ;   ?access to lethal means;   ?combat exposure;   ?history of physical, emotional, mental and or sexual abuse;   ?sexual orientation;   ?mental health stigma and perceived barriers to care;   ?recent local cluster of suicides (consider possible contagion)       Current Protective Factors:   Psychiatric History and Status:    ?compliance with psychiatric medication;   ?engagement in evidenced-based treatment;   ?motivation and readiness to change;   ?insight about problems.      Psychological Characteristics:    ?problem solving and effective coping strategies;   ?resilience;   ?reasons for living;   ?future orientation;   ?perceived internal locus of control.     Psychosocial Factors:     ?healthy intimate relationships;   ?social support and community involvement.      Physical Injury or Illness:    ?medical compliance;   ?able to access care as needed;   ?support for help seeking.       Other Protective Factors:    ?restricted access to lethal means;   ?Yarsani/spirituality,   ?crisis response or other related training.        Risk Level: Not Currently at Clinically Significant Risk  Hospitalization is not deemed necessary at this time as the patient does not present a clear or imminent danger to self or others. No indication for pursuing higher level of care. Outpatient management is currently most appropriate and least restrictive level of care.     Current risk:   SUICIDE: Low   Homicide: Not  applicable   Self-harm: Not applicable   Relapse: Not applicable   Other:    Safety Plan reviewed? No   If evidence of imminent risk is present, intervention/plan:       Treatment Goal(s)/Objective(s) addressed:     Goal: Reduce symptoms of hypomania and/or anabell    Explore mood state, level  of energy,  level of control over thoughts, and sleeping pattern.  Monitor taking of  psychotropic medications as directed.  Encourage trust in the therapy relationship by sharing fears about dependency, loss, and abandonment.  Accomplish mood stability, having slower reaction with anger, less expansive, and being more socially appropriate and sensitive.  Identify and list positive traits and behaviors that help build genuine self esteem.  Lower grandiose statements and learn to express self more realistically.  Learn to be less agitated and distracted, and be able to sit quietly and calmly for 30 minutes.  Agree to sleep about 5 hours or more per night.      Progress toward Treatment Goals: Mild decline    Plan:    1) The patient will return to the clinic 2-3 weeks.  2) Crisis Response Plan:  Reviewed emergency resources with the patient and the patient expressed understanding including:  If feeling suicidal, patient will call or present to the [facility]al Health Clinic during duty hours or present to closest ED ([facility] or [facility], call 911 or crisis hotline ([phone]-QXKS) after duty hours.  3) Referrals/Consults:  N/A  4) Barriers to Learning:  No  5) Readiness to Learn:  Yes  6) Cultural Concerns:  No  7) Patient voiced understanding of, and agreement with, plan and goals as annotated above.  8) Declare these services are medically necessary and appropriate to the patient’s diagnosis and needs  9) The point of contact at the [facility]al Health Clinic regarding this evaluation is Dr. Goldsmith, Psychologist.      Martín Goldsmith III,  Ed.D.  10/8/2018

## 2024-11-07 ENCOUNTER — OFFICE VISIT (OUTPATIENT)
Dept: ENDOCRINOLOGY | Facility: MEDICAL CENTER | Age: 59
End: 2024-11-07
Attending: INTERNAL MEDICINE
Payer: MEDICARE

## 2024-11-07 VITALS
WEIGHT: 126.5 LBS | BODY MASS INDEX: 18.74 KG/M2 | HEIGHT: 69 IN | SYSTOLIC BLOOD PRESSURE: 106 MMHG | HEART RATE: 70 BPM | RESPIRATION RATE: 17 BRPM | DIASTOLIC BLOOD PRESSURE: 58 MMHG | OXYGEN SATURATION: 97 %

## 2024-11-07 DIAGNOSIS — E78.5 DYSLIPIDEMIA: ICD-10-CM

## 2024-11-07 DIAGNOSIS — Z79.4 LONG-TERM INSULIN USE (HCC): ICD-10-CM

## 2024-11-07 DIAGNOSIS — E10.65 UNCONTROLLED TYPE 1 DIABETES MELLITUS WITH HYPERGLYCEMIA (HCC): ICD-10-CM

## 2024-11-07 DIAGNOSIS — E55.9 VITAMIN D INSUFFICIENCY: ICD-10-CM

## 2024-11-07 LAB
HBA1C MFR BLD: 8.2 % (ref ?–5.8)
POCT INT CON NEG: NEGATIVE
POCT INT CON POS: POSITIVE

## 2024-11-07 PROCEDURE — 83036 HEMOGLOBIN GLYCOSYLATED A1C: CPT | Performed by: INTERNAL MEDICINE

## 2024-11-07 PROCEDURE — 95249 CONT GLUC MNTR PT PROV EQP: CPT | Performed by: INTERNAL MEDICINE

## 2024-11-07 PROCEDURE — 3078F DIAST BP <80 MM HG: CPT | Performed by: INTERNAL MEDICINE

## 2024-11-07 PROCEDURE — 99214 OFFICE O/P EST MOD 30 MIN: CPT | Performed by: INTERNAL MEDICINE

## 2024-11-07 PROCEDURE — 3074F SYST BP LT 130 MM HG: CPT | Performed by: INTERNAL MEDICINE

## 2024-11-07 PROCEDURE — 95251 CONT GLUC MNTR ANALYSIS I&R: CPT | Performed by: INTERNAL MEDICINE

## 2024-11-07 ASSESSMENT — FIBROSIS 4 INDEX: FIB4 SCORE: 1.26

## 2024-11-07 NOTE — PROGRESS NOTES
"RN-CDE Note    Subjective:   Endocrinology Clinic Progress Note  PCP: Vincent Ding M.D.    HPI:  Celso Rose is a 59 y.o. old patient who is seen today by the Diabetes Nurse Specialist for review of his uncontrolled type 1 diabetes with long term use of insulin.    Recent changes in health: denies any changes.   DM:   Last A1c:   Lab Results   Component Value Date/Time    HBA1C 8.3 (H) 07/31/2024 07:00 AM      Previous A1c was 8.3 on 7/31/24  A1C GOAL: < 7    Diabetes Medications:   Lantus 17 units per day (adjusts according to hs blood sugars)  Novolog using a 1:15 cho ratio and 1:50 over 150 correction scale         Exercise: moderate regular exercise, aerobic < 3 days a week  Diet: \"healthy\" diet  in general  Patient's body mass index is unknown because there is no height or weight on file. Exercise and nutrition counseling were performed at this visit.    Glucose monitoring frequency:  Using Freestyle Nicholas        Hypoglycemic episodes: states he does have some     Last Retinal Exam: on file and up-to-date     Foot Exam:  Monofilament: current.   Lab Results   Component Value Date/Time    MALBCRT see below 07/31/2024 07:00 AM    MICROALBUR <1.2 07/31/2024 07:00 AM        ACR Albumin/Creatinine Ratio goal <30     HTN:   Blood pressure goal <130/<80   Currently Rx ACE/ARB:  Lisinopril 2.5 mg daily  NOT TAKING    Dyslipidemia:    Lab Results   Component Value Date/Time    CHOLSTRLTOT 175 07/31/2024 07:00 AM     (H) 07/31/2024 07:00 AM    HDL 45 07/31/2024 07:00 AM    TRIGLYCERIDE 63 07/31/2024 07:00 AM         Currently Rx Statin:  Atorvastatin 40 mg daily  NOT TAKING    He  reports that he quit smoking about 3 years ago. His smoking use included cigarettes. He started smoking about 4 years ago. He has a 1 pack-year smoking history. He has never used smokeless tobacco.    Plan:     Discussed and educated on:   - All medications, side effects and compliance (discussed carefully)  - Annual eye " examinations at Ophthalmology  - HbA1C: target  - Home glucose monitoring emphasized  - Weight control and daily exercise    Recommended medication changes: none

## 2024-11-07 NOTE — PROGRESS NOTES
CHIEF COMPLAINT: Patient is here for follow up of Type 1 Diabetes Mellitus    HPI:     Celso Rose is a 59 y.o. male with Type 1 Diabetes Mellitus here for follow up.    Labs from 11/7/2024 show A1c is 8.2%  Labs from 5/9/2024 show A1c is 8.8%  Labs from 11/6/2023 show A1c is 8.3%  Labs from 8/10/2023 show A1c is 8.6%  Labs from 3/29/2023 show A1c was 8.7%  Labs from 9/14/2022 show A1c was 8.0%    He was previously seen by the 2 nurse practitioners and has requested to switch to me  He has uncontrolled type 1 diabetes diagnosed 22 years ago.      He has been wearing a freestyle nicholas 2 CGM for over 6 months  He has a medical history of Bipolar disorder   He is currently not on any medications for his bipolar disorder  He reports that he sees a psychiatrist  He states he saw a psychiatrist who told him that he does not need medications for his bipolar disorder.        Currently he is taking:  Semglee 15u daily  NovoLog 4 units per meal  (carb ratio 1:15)  Correction scale 1:50> 150      He denies SE with his meds    He has dental caries which affects his sugars  He needs to have teeth pulled but he can't afford it at this time as he was told by a dentist he needs to see an oral surgeon and the oral surgeon told him he needs an A1c less than  7.6%          Download of his Nicholas CGM shows             He does not have diabetic kidney disease  He is on Lisinopril but he admits to noncompliance   Latest Reference Range & Units 07/31/24 07:00   Micro Alb Creat Ratio 0 - 30 mg/g see below   Creatinine, Urine mg/dL 154.25   Microalbumin, Urine Random mg/dL <1.2         He has hyperlipidemia and is on Lipitor 40 mg daily  He admits to noncompliance   He does not have a history of cardiovascular disease or peripheral vascular disease   Latest Reference Range & Units 07/31/24 07:00   Cholesterol,Tot 100 - 199 mg/dL 175   Triglycerides 0 - 149 mg/dL 63   HDL >=40 mg/dL 45   LDL <100 mg/dL 117 (H)         He had na 6/2024            BG Diary:  CGM was downloaded and reviewed     Weight has been stable    Diabetes Complications   Retinopathy: Known retinopathy.  Last eye exam: 6/2024  Neuropathy: Denies paresthesias or numbness in hands or feet. Denies any foot wounds.  Exercise: Minimal.  Diet: Fair.  Patient's medications, allergies, and social histories were reviewed and updated as appropriate.    ROS:     CONS:     No fever, no chills   EYES:     No diplopia, no blurry vision   CV:           No chest pain, no palpitations   PULM:     No SOB, no cough, no hemoptysis.   GI:            No nausea, no vomiting, no diarrhea, no constipation   ENDO:     No polyuria, no polydipsia, no heat intolerance, no cold intolerance       Past Medical History:  Problem List:  2024-08: Adenomatous polyp  2024-07: Hypertension associated with type 1 diabetes mellitus ([facility])  2024-07: Vitamin D deficiency  2024-07: Dysplasia of one kidney  2024-07: Aortic atherosclerosis ([facility])  2024-07: Colon cancer screening  2023-05: Attention and concentration deficit  2023-05: ALLI (generalized anxiety disorder)  2023-03: Long-term insulin use ([facility])  2023-03: Uncontrolled type 1 diabetes mellitus with hyperglycemia   ([facility])  2022-02: Chronic diarrhea  2021-09: Dyslipidemia  2021-09: Chronic pain of right knee  2021-09: Bilateral shoulder pain  2021-09: Anxiety  2020-12: IBS (irritable bowel syndrome)  2020-12: Diabetes mellitus type I ([facility])  2020-12: Otalgia of both ears  2019-01: Cannabis use disorder, moderate, dependence ([facility])  2018-07: Bipolar 2 disorder, major depressive episode ([facility])  2018-07: Tobacco abuse  2018-04: Marijuana use  2018-01: Uncontrolled type 2 diabetes mellitus without complication,   with long-term current use of insulin  2018-01: Marijuana abuse in remission  2018-01: Bipolar disorder, current episode mixed, moderate ([facility])      Past Surgical History:  Past Surgical History:   Procedure Laterality Date    KNEE ARTHROSCOPY      SHOULDER  "ARTHROSCOPY          Allergies:  Patient has no known allergies.     Social History:  Social History     Tobacco Use    Smoking status: Former     Current packs/day: 0.00     Average packs/day: 1 pack/day for 1 year (1.0 ttl pk-yrs)     Types: Cigarettes     Start date: 4/1/2020     Quit date: 4/1/2021     Years since quitting: 3.6    Smokeless tobacco: Never    Tobacco comments:     started smoking at age 14 quit for a couple months then started    Vaping Use    Vaping status: Never Used   Substance Use Topics    Alcohol use: No    Drug use: Yes     Frequency: 7.0 times per week     Types: Marijuana, Inhaled     Comment: daily        Family History:   family history includes ADD / ADHD in his sister; Alcohol/Drug in his sister; Autism spectrum disorder in his grandson; Dementia in his paternal grandmother; Diabetes in his father; Drug abuse in his sister; Heart Disease in his mother; Hypertension in his sister; Other in his father.      PHYSICAL EXAM:   OBJECTIVE:  Vital signs: /58 (BP Location: Left arm, Patient Position: Sitting)   Pulse 70   Resp 17   Ht 1.753 m (5' 9\")   Wt 57.4 kg (126 lb 8 oz)   SpO2 97%   BMI 18.68 kg/m²   GENERAL: Well-developed, well-nourished in no apparent distress.   EYE:  No ocular asymmetry, PERRLA  HENT: Pink, moist mucous membranes.    NECK: No thyromegaly.   CARDIOVASCULAR:  No murmurs  LUNGS: Clear breath sounds  ABDOMEN: Soft, nontender   EXTREMITIES: No clubbing, cyanosis, or edema.   NEUROLOGICAL: No gross focal motor abnormalities   LYMPH: No cervical adenopathy palpated.   SKIN: No rashes, lesions.     Labs:  Lab Results   Component Value Date/Time    HBA1C 8.2 (A) 11/07/2024 07:54 AM        Lab Results   Component Value Date/Time    WBC 9.4 07/31/2024 07:00 AM    RBC 4.38 (L) 07/31/2024 07:00 AM    HEMOGLOBIN 15.0 07/31/2024 07:00 AM    .4 (H) 07/31/2024 07:00 AM    MCH 34.2 (H) 07/31/2024 07:00 AM    MCHC 33.8 07/31/2024 07:00 AM    RDW 53.4 (H) " 07/31/2024 07:00 AM    MPV 10.8 07/31/2024 07:00 AM       Lab Results   Component Value Date/Time    SODIUM 139 07/31/2024 07:00 AM    POTASSIUM 4.0 07/31/2024 07:00 AM    CHLORIDE 106 07/31/2024 07:00 AM    CO2 23 07/31/2024 07:00 AM    ANION 10.0 07/31/2024 07:00 AM    GLUCOSE 87 07/31/2024 07:00 AM    BUN 12 07/31/2024 07:00 AM    CREATININE 0.87 07/31/2024 07:00 AM    CALCIUM 9.5 07/31/2024 07:00 AM    ASTSGOT 17 04/25/2024 07:46 AM    ALTSGPT 15 07/31/2024 07:00 AM    TBILIRUBIN 0.4 04/25/2024 07:46 AM    ALBUMIN 4.4 04/25/2024 07:46 AM    TOTPROTEIN 6.9 04/25/2024 07:46 AM    GLOBULIN 2.5 04/25/2024 07:46 AM    AGRATIO 1.8 04/25/2024 07:46 AM       Lab Results   Component Value Date/Time    CHOLSTRLTOT 181 03/07/2023 0753    TRIGLYCERIDE 80 03/07/2023 0753    HDL 40 03/07/2023 0753     (H) 03/07/2023 0753       Lab Results   Component Value Date/Time    MALBCRT see below 07/31/2024 07:00 AM    MICROALBUR <1.2 07/31/2024 07:00 AM        Lab Results   Component Value Date/Time    TSHULTRASEN 1.060 03/07/2023 0753     No results found for: FREEDIR  No results found for: FREET3  No results found for: THYSTIMIG        ASSESSMENT/PLAN:     1. Uncontrolled type 1 diabetes mellitus with hyperglycemia (HCC)  Uncontrolled secondary to hyperglycemia   CGM was downloaded and reviewed  Continue Lantus 15u daily  Change  carb ratio 1:12 grams   Correction scale of 1 for every 50 above 150  He is up-to-date with his labs  See Magdalena in 3 months    2. Dyslipidemia  Uncontrolled  Continue 40mg of atorvastatin  Recommend better compliance  Repeat fasting lipids in 3-6 months    3. Vitamin D deficiency  Controlled  Continue vitamin D  Repeat calcium vitamin D in 3-6 months    4. Long-term insulin use (HCC)  Patient is on long-term insulin for type 1 diabetes      Return in about 3 months (around 2/7/2025).      Thank you kindly for allowing me to participate in the diabetes care plan for this patient.    Brandon Graves  MD, [facility], [facility]      CC:   Vincent Ding M.D.

## 2025-02-24 ENCOUNTER — APPOINTMENT (OUTPATIENT)
Dept: ENDOCRINOLOGY | Facility: MEDICAL CENTER | Age: 60
End: 2025-02-24
Attending: INTERNAL MEDICINE
Payer: MEDICARE

## 2025-04-07 DIAGNOSIS — E10.65 UNCONTROLLED TYPE 1 DIABETES MELLITUS WITH HYPERGLYCEMIA (HCC): ICD-10-CM

## 2025-04-07 NOTE — TELEPHONE ENCOUNTER
Received request via: Patient    Was the patient seen in the last year in this department? Yes    Does the patient have an active prescription (recently filled or refills available) for medication(s) requested? No    Pharmacy Name: [id number]    Does the patient have [facility] and need 100-day supply? (This applies to ALL medications) Patient does not have SCP

## 2025-04-09 ENCOUNTER — OFFICE VISIT (OUTPATIENT)
Dept: MEDICAL GROUP | Facility: PHYSICIAN GROUP | Age: 60
End: 2025-04-09
Payer: MEDICARE

## 2025-04-09 VITALS
TEMPERATURE: 98.3 F | RESPIRATION RATE: 16 BRPM | DIASTOLIC BLOOD PRESSURE: 62 MMHG | HEIGHT: 69 IN | HEART RATE: 98 BPM | OXYGEN SATURATION: 98 % | SYSTOLIC BLOOD PRESSURE: 110 MMHG | BODY MASS INDEX: 18.66 KG/M2 | WEIGHT: 126 LBS

## 2025-04-09 DIAGNOSIS — I70.0 AORTIC ATHEROSCLEROSIS (HCC): Chronic | ICD-10-CM

## 2025-04-09 DIAGNOSIS — K63.5 POLYP OF COLON, UNSPECIFIED PART OF COLON, UNSPECIFIED TYPE: ICD-10-CM

## 2025-04-09 DIAGNOSIS — E10.65 TYPE 1 DIABETES MELLITUS WITH HYPERGLYCEMIA (HCC): Chronic | ICD-10-CM

## 2025-04-09 DIAGNOSIS — F31.81 BIPOLAR 2 DISORDER, MAJOR DEPRESSIVE EPISODE (HCC): Chronic | ICD-10-CM

## 2025-04-09 DIAGNOSIS — B35.1 ONYCHOMYCOSIS: ICD-10-CM

## 2025-04-09 DIAGNOSIS — R41.840 ATTENTION AND CONCENTRATION DEFICIT: ICD-10-CM

## 2025-04-09 DIAGNOSIS — Z79.4 LONG-TERM INSULIN USE (HCC): Chronic | ICD-10-CM

## 2025-04-09 DIAGNOSIS — I15.2 HYPERTENSION ASSOCIATED WITH TYPE 1 DIABETES MELLITUS (HCC): Chronic | ICD-10-CM

## 2025-04-09 DIAGNOSIS — E10.59 HYPERTENSION ASSOCIATED WITH TYPE 1 DIABETES MELLITUS (HCC): Chronic | ICD-10-CM

## 2025-04-09 DIAGNOSIS — E78.5 DYSLIPIDEMIA: Chronic | ICD-10-CM

## 2025-04-09 DIAGNOSIS — K58.0 IRRITABLE BOWEL SYNDROME WITH DIARRHEA: ICD-10-CM

## 2025-04-09 DIAGNOSIS — E55.9 VITAMIN D DEFICIENCY: Chronic | ICD-10-CM

## 2025-04-09 PROCEDURE — 3074F SYST BP LT 130 MM HG: CPT | Performed by: INTERNAL MEDICINE

## 2025-04-09 PROCEDURE — 99215 OFFICE O/P EST HI 40 MIN: CPT | Performed by: INTERNAL MEDICINE

## 2025-04-09 PROCEDURE — 3078F DIAST BP <80 MM HG: CPT | Performed by: INTERNAL MEDICINE

## 2025-04-09 ASSESSMENT — FIBROSIS 4 INDEX: FIB4 SCORE: 1.28

## 2025-04-09 NOTE — ASSESSMENT & PLAN NOTE
This is a chronic condition noted with previous CT 2023.  Patient asymptomatic.    Pt to continue with a healthy low saturated fat, low sugar, mostly plant based diet and regular exercise.   It is also important to maintain good blood pressure control.

## 2025-04-09 NOTE — PROGRESS NOTES
PRIMARY CARE CLINIC VISIT        Chief Complaint   Patient presents with    Nail Problem     Toe Nail fungus  Bipolar disorder  Follow-up type 1 diabetes  Hyperlipidemia  Long-term insulin use  Hypertension  Vitamin D deficiency  Attention and concentration deficit  Irritable bowel syndrome  Colon polyps  Medication review      History of Present Illness     Bipolar 2 disorder, major depressive episode (HCC)  This is a chronic condition.  Patient followed by behavioral specialist.  Patient presently not on therapy.  No new symptoms reported.      Diabetes mellitus type I (HCC)  Chronic condition.  Patient followed by endocrinology service.  Patient currently being treated with insulin.  The patient denies significant hypoglycemia.  Lab test November 2024 8.2%.  Lab test requested for follow-up.    Dyslipidemia  This is a chronic condition.  Patient reported that he is not taking atorvastatin.  Patient asymptomatic.  Lab test ordered for follow-up.    Long-term insulin use (HCC)  Chronic condition for the patient has been using insulin.  No new symptoms reported.    Hypertension associated with type 1 diabetes mellitus (HCC)  Chronic condition.  The patient reported that he is presently not taking lisinopril.  Blood pressure has been in normal range.  Patient currently asymptomatic.    Vitamin D deficiency  Chronic condition.  The patient is taking vitamin D supplementation.  Patient is due for lab test.    Onychomycosis  Chronic condition.  Affecting several toenails.  Patient denied pain or discomfort.  The patient is requesting prescription for Jublia    Attention and concentration deficit  Chronic condition.  The patient was seen previously by behavioral health specialist.  Patient currently not on therapy.  Patient reported that his symptoms are stable.    IBS (irritable bowel syndrome)  Chronic condition.  The patient with history of frequent diarrhea.  The patient was seen by GI specialist previously.  Patient  denies fever chills abdominal pain or GI bleeding.  Patient stated that he had colonoscopy done previously.    Colon polyp  Chronic condition.  Last colonoscopy done 2024 show patient with two 3 to 4 mm polyp in the sigmoid colon.  This was removed with cold snare.  GI recommend patient to repeat the colonoscopy in approximately 7 years.  Patient denies any new symptoms.    Current Outpatient Medications on File Prior to Visit   Medication Sig Dispense Refill    Continuous Glucose Sensor (FREESTYLE SYED 2 SENSOR) Misc APPLY 1 SENSOR EVERY 14 DAYS 2 Each 10    vitamin D2, Ergocalciferol, (DRISDOL) 1.25 MG (38532 UT) Cap capsule Take 1 Capsule by mouth every 7 days. 12 Capsule 3    LANTUS SOLOSTAR 100 UNIT/ML Solution Pen-injector injection Inject 25 Units under the skin every evening. INJECT 30 UNITS SUBCUTANEOUSLY IN THE EVENING 15 mL 6    Insulin Pen Needle 32 G x 4 mm (BD PEN NEEDLE ADA U/F) Using 1 pen needle with insulin injections 4 times a day 300 Each 11    Blood Glucose Test Strips Test strips order: Test strips for Bernard Contour Next meter. Sig: use 3 times daily and prn ssx high or low sugar 100 Each 3    insulin aspart (NOVOLOG FLEXPEN RELION) 100 UNIT/ML injection PEN INJECT 10 UNITS SUBCUTANEOUSLY THREE TIMES DAILY BEFORE MEAL(S) 15 mL 3    CONTOUR NEXT TEST strip USE STRIP TO CHECK GLUCOSE THREE TIMES DAILY AND AS NEEDED FOR SIGNS AND SYMPTOMS OR LOW SUGAR       No current facility-administered medications on file prior to visit.        Allergies: Patient has no known allergies.    Current Outpatient Medications Ordered in Epic   Medication Sig Dispense Refill    Efinaconazole 10 % Solution Apply to affected  toenails daily for 48weeks using the the integrated flow-through brush applicator 8 mL 2    Continuous Glucose Sensor (FREESTYLE SYED 2 SENSOR) Misc APPLY 1 SENSOR EVERY 14 DAYS 2 Each 10    vitamin D2, Ergocalciferol, (DRISDOL) 1.25 MG (59868 UT) Cap capsule Take 1 Capsule by mouth every 7  days. 12 Capsule 3    LANTUS SOLOSTAR 100 UNIT/ML Solution Pen-injector injection Inject 25 Units under the skin every evening. INJECT 30 UNITS SUBCUTANEOUSLY IN THE EVENING 15 mL 6    Insulin Pen Needle 32 G x 4 mm (BD PEN NEEDLE ADA U/F) Using 1 pen needle with insulin injections 4 times a day 300 Each 11    Blood Glucose Test Strips Test strips order: Test strips for Bernard Contour Next meter. Sig: use 3 times daily and prn ssx high or low sugar 100 Each 3    insulin aspart (NOVOLOG FLEXPEN RELION) 100 UNIT/ML injection PEN INJECT 10 UNITS SUBCUTANEOUSLY THREE TIMES DAILY BEFORE MEAL(S) 15 mL 3    CONTOUR NEXT TEST strip USE STRIP TO CHECK GLUCOSE THREE TIMES DAILY AND AS NEEDED FOR SIGNS AND SYMPTOMS OR LOW SUGAR       No current Epic-ordered facility-administered medications on file.       Past Medical History:   Diagnosis Date    Arthritis     Bipolar 1 disorder (HCC)     Bipolar disorder (HCC)     Diabetes (HCC)     Irritable bowel disease     Tobacco abuse 2018       Past Surgical History:   Procedure Laterality Date    KNEE ARTHROSCOPY      SHOULDER ARTHROSCOPY         Family History   Problem Relation Age of Onset    Heart Disease Mother     Diabetes Father     Other Father         gambling disorder    Drug abuse Sister     ADD / ADHD Sister     Hypertension Sister     Alcohol/Drug Sister     Dementia Paternal Grandmother     Autism spectrum disorder Grandson        Social History     Tobacco Use   Smoking Status Former    Current packs/day: 0.00    Average packs/day: 1 pack/day for 1 year (1.0 ttl pk-yrs)    Types: Cigarettes    Start date: 2020    Quit date: 2021    Years since quittin.0   Smokeless Tobacco Never   Tobacco Comments    started smoking at age 14 quit for a couple months then started        Social History     Substance and Sexual Activity   Alcohol Use No       Review of systems  As per HPI above. All other systems reviewed and negative.      Past Medical, Social, and  "Family history reviewed and updated in EPIC       LAB DATA:     I have independently reviewed / interpreted labs    Lab Results   Component Value Date/Time    HBA1C 8.2 (A) 11/07/2024 07:54 AM    HBA1C 8.3 (H) 07/31/2024 07:00 AM    HBA1C 8.8 (A) 05/09/2024 09:37 AM        Lab Results   Component Value Date/Time    WBC 9.4 07/31/2024 07:00 AM    HEMOGLOBIN 15.0 07/31/2024 07:00 AM    HEMATOCRIT 44.4 07/31/2024 07:00 AM    .4 (H) 07/31/2024 07:00 AM    PLATELETCT 209 07/31/2024 07:00 AM       Lab Results   Component Value Date/Time    SODIUM 139 07/31/2024 07:00 AM    POTASSIUM 4.0 07/31/2024 07:00 AM    GLUCOSE 87 07/31/2024 07:00 AM    BUN 12 07/31/2024 07:00 AM    CREATININE 0.87 07/31/2024 07:00 AM       Lab Results   Component Value Date/Time    CHOLSTRLTOT 175 07/31/2024 07:00 AM    TRIGLYCERIDE 63 07/31/2024 07:00 AM    HDL 45 07/31/2024 07:00 AM     (H) 07/31/2024 07:00 AM       Lab Results   Component Value Date/Time    ALTSGPT 15 07/31/2024 07:00 AM          Objective     /62 (BP Location: Right arm, Patient Position: Sitting, BP Cuff Size: Adult)   Pulse 98   Temp 36.8 °C (98.3 °F) (Temporal)   Resp 16   Ht 1.753 m (5' 9\")   Wt 57.2 kg (126 lb)   SpO2 98%    Body mass index is 18.61 kg/m².    General: alert in no apparent distress.  Cardiovascular: regular rate and rhythm  Pulmonary: lungs : no wheezing   Gastrointestinal: BS present.   Monofilament testing with a 10 gram force: sensation intact: intact bilaterally  Visual Inspection: Feet without maceration, ulcers, fissures.  Pedal pulses: decreased bilaterally   To now with yellow thickened discoloration consistent with onychomycosis  Assessment and Plan     1. Bipolar 2 disorder, major depressive episode (HCC)  Chronic stable condition.  Patient currently not on therapy.  Recommend follow-up with behavioral specialist as directed    2. Type 1 diabetes mellitus with hyperglycemia (HCC)  - HEMOGLOBIN A1C; Future  - Basic " Metabolic Panel; Future  Chronic condition.  Current status unclear.  Lab test requested to check A1c.  Patient to continue with insulin Lantus Solostar 25 units daily.  Patient to follow-up with endocrinology service.  Clinical notes:   -Discussed with the patient goals for Diabetes management:   HbA1C < 7% /  Fasting BG   /  2 hrs post meal BG below 160  -Reviewed pathophysiology of diabetes and the importance of glycemic control to reduce the risk of diabetes related complications   Microvascular: retinopathy, neuropathy, nephropathy  Macrovascular: heart attack, stroke, peripheral vascular dz  -Advised pt to monitor sugar closely  -Counseled pt the importance of recognizing and treating hypoglycemia.   -The importance of increasing physical activity to improve diabetes control  discussed with the patient.   -Patient was also educated on changing diet and making better choices to help control blood sugar.          3. Dyslipidemia  - ALANINE AMINO-TRANS; Future  - Lipid Profile; Future  Chronic condition.  Current status unclear.  Lipid panel requested.  Continue with diet and lifestyle modification    4. Long-term insulin use (HCC)  Chronic stable condition.  Continue to monitor.    5. Hypertension associated with type 1 diabetes mellitus (HCC)  Chronic condition.  BP normal.  Continue to monitor without medication.    6. Vitamin D deficiency  - VITAMIN D,25 HYDROXY (DEFICIENCY); Future  Chronic condition.  Current status unclear.  Lab test requested to check vitamin D level    7. Onychomycosis  Chronic condition.  Uncontrolled.   Offered to refer the patient to podiatrist.  The patient declined.  The patient wishes to try prescription written for Jublia solution to be used as directed below  - Efinaconazole 10 % Solution; Apply to affected  toenails daily for 48weeks using the the integrated flow-through brush applicator  Dispense: 8 mL; Refill: 2    8. Attention and concentration deficit  Chronic stable  condition.  Patient currently not on therapy.  Recommend patient to continue follow-up with behavioral specialist    9. Irritable bowel syndrome with diarrhea  Chronic stable.  Currently not on therapy.  Continue to monitor.  [facility]    10. Polyp of colon, unspecified part of colon, unspecified type  Chronic condition.  Last colonoscopy 2024.  GI recommend to repeat the study in 2031.  Currently the patient asymptomatic              Time spent:  42   minutes     Reviewed medical records including:  November 7, 2024 endocrinology note  July 30, 2024 medical office note  June 13, 2024 medical office note  May 9, 2024 endocrinology note  March 7, 2024 behavioral health note  January 2, 2024 behavioral health note  Laboratory data November 7, 2024, July 31, 2024, May 9, 2024    Total time includes face to face time and non-face to face time.  Chart review before the visit, the actual patient visit, and time spent on documentation in the EMR after the visit.  Chart review/prep, review of other providers' records, Independent review of imagings/labs ,  time for history/examination , pt's counseling/education, ordering, prescribing, treatment plan discussed with patient, and care coordination.            Please note that this dictation was created using voice recognition software. I have made every reasonable attempt to correct obvious errors, but I expect that there are errors of grammar and possibly content that I did not discover before finalizing the note.    Vincent Ding MD  Internal Medicine  [facility]

## 2025-04-09 NOTE — ASSESSMENT & PLAN NOTE
Chronic condition.  The patient is taking vitamin D supplementation.  Patient is due for lab test.

## 2025-04-09 NOTE — ASSESSMENT & PLAN NOTE
Chronic condition.  The patient with history of frequent diarrhea.  The patient was seen by GI specialist previously.  Patient denies fever chills abdominal pain or GI bleeding.  Patient stated that he had colonoscopy done previously.

## 2025-04-09 NOTE — ASSESSMENT & PLAN NOTE
This is a chronic condition.  Patient followed by behavioral specialist.  Patient presently not on therapy.  No new symptoms reported.

## 2025-04-09 NOTE — ASSESSMENT & PLAN NOTE
Chronic condition.  The patient reported that he is presently not taking lisinopril.  Blood pressure has been in normal range.  Patient currently asymptomatic.

## 2025-04-09 NOTE — ASSESSMENT & PLAN NOTE
Chronic condition.  Last colonoscopy done 2024 show patient with two 3 to 4 mm polyp in the sigmoid colon.  This was removed with cold snare.  GI recommend patient to repeat the colonoscopy in approximately 7 years.  Patient denies any new symptoms.

## 2025-04-09 NOTE — ASSESSMENT & PLAN NOTE
Chronic condition.  The patient was seen previously by behavioral health specialist.  Patient currently not on therapy.  Patient reported that his symptoms are stable.

## 2025-04-09 NOTE — ASSESSMENT & PLAN NOTE
This is a chronic condition.  Patient reported that he is not taking atorvastatin.  Patient asymptomatic.  Lab test ordered for follow-up.

## 2025-04-09 NOTE — ASSESSMENT & PLAN NOTE
Chronic condition.  Patient followed by endocrinology service.  Patient currently being treated with insulin.  The patient denies significant hypoglycemia.  Lab test November 2024 8.2%.  Lab test requested for follow-up.

## 2025-04-10 ENCOUNTER — TELEPHONE (OUTPATIENT)
Dept: ENDOCRINOLOGY | Facility: MEDICAL CENTER | Age: 60
End: 2025-04-10
Payer: MEDICARE

## 2025-04-10 NOTE — TELEPHONE ENCOUNTER
Received [employer] request via [employer]  for FreeStyle Nicholas 2 Sensor . (Quantity:2 Each, Day Supply:28)     Insurance: [employer] Medicare Part D  Member ID: [id number]  BIN: [id number]  PCN: [id number]  Group: [facility]     Ran Test claim via [facility]ola & medication Rejects stating prior authorization is required.    Prior Authorization  has been submitted via [facility] key: [id number]    Prior Authorization has been approved   Prior Authorization reference number:  [id number]  Effective dates: 04/10/2025-04/10/2028  Copay: $28.54     Is patient eligible to fill with [facility]? Yes    Next Steps: Called and advised patient regarding PA approval    Thank you,  Dianna Rdz [facility]  Endo RX Coordinator  [phone]

## 2025-04-29 ENCOUNTER — RESULTS FOLLOW-UP (OUTPATIENT)
Dept: MEDICAL GROUP | Facility: PHYSICIAN GROUP | Age: 60
End: 2025-04-29
Payer: MEDICARE

## 2025-04-29 ENCOUNTER — HOSPITAL ENCOUNTER (OUTPATIENT)
Dept: LAB | Facility: MEDICAL CENTER | Age: 60
End: 2025-04-29
Attending: INTERNAL MEDICINE
Payer: MEDICARE

## 2025-04-29 DIAGNOSIS — E10.65 TYPE 1 DIABETES MELLITUS WITH HYPERGLYCEMIA (HCC): Chronic | ICD-10-CM

## 2025-04-29 DIAGNOSIS — E78.5 DYSLIPIDEMIA: Chronic | ICD-10-CM

## 2025-04-29 DIAGNOSIS — E10.65 TYPE 1 DIABETES MELLITUS WITH HYPERGLYCEMIA (HCC): ICD-10-CM

## 2025-04-29 DIAGNOSIS — E55.9 VITAMIN D DEFICIENCY: Chronic | ICD-10-CM

## 2025-04-29 PROBLEM — E10.69 DYSLIPIDEMIA DUE TO TYPE 1 DIABETES MELLITUS (HCC): Chronic | Status: ACTIVE | Noted: 2023-03-29

## 2025-04-29 PROBLEM — E10.69 DYSLIPIDEMIA DUE TO TYPE 1 DIABETES MELLITUS (HCC): Status: ACTIVE | Noted: 2023-03-29

## 2025-04-29 LAB
25(OH)D3 SERPL-MCNC: 57 NG/ML (ref 30–100)
ALT SERPL-CCNC: 14 U/L (ref 2–50)
ANION GAP SERPL CALC-SCNC: 9 MMOL/L (ref 7–16)
BUN SERPL-MCNC: 13 MG/DL (ref 8–22)
CALCIUM SERPL-MCNC: 9.2 MG/DL (ref 8.5–10.5)
CHLORIDE SERPL-SCNC: 107 MMOL/L (ref 96–112)
CHOLEST SERPL-MCNC: 172 MG/DL (ref 100–199)
CO2 SERPL-SCNC: 23 MMOL/L (ref 20–33)
CREAT SERPL-MCNC: 0.92 MG/DL (ref 0.5–1.4)
CREAT UR-MCNC: 224 MG/DL
EST. AVERAGE GLUCOSE BLD GHB EST-MCNC: 186 MG/DL
FASTING STATUS PATIENT QL REPORTED: NORMAL
GFR SERPLBLD CREATININE-BSD FMLA CKD-EPI: 94 ML/MIN/1.73 M 2
GLUCOSE SERPL-MCNC: 211 MG/DL (ref 65–99)
HBA1C MFR BLD: 8.1 % (ref 4–5.6)
HDLC SERPL-MCNC: 42 MG/DL
LDLC SERPL CALC-MCNC: 108 MG/DL
MICROALBUMIN UR-MCNC: <1.2 MG/DL
MICROALBUMIN/CREAT UR: NORMAL MG/G (ref 0–30)
POTASSIUM SERPL-SCNC: 4.3 MMOL/L (ref 3.6–5.5)
SODIUM SERPL-SCNC: 139 MMOL/L (ref 135–145)
TRIGL SERPL-MCNC: 112 MG/DL (ref 0–149)

## 2025-04-29 PROCEDURE — 82306 VITAMIN D 25 HYDROXY: CPT

## 2025-04-29 PROCEDURE — 82570 ASSAY OF URINE CREATININE: CPT

## 2025-04-29 PROCEDURE — 80048 BASIC METABOLIC PNL TOTAL CA: CPT

## 2025-04-29 PROCEDURE — 36415 COLL VENOUS BLD VENIPUNCTURE: CPT

## 2025-04-29 PROCEDURE — 82043 UR ALBUMIN QUANTITATIVE: CPT

## 2025-04-29 PROCEDURE — 80061 LIPID PANEL: CPT

## 2025-04-29 PROCEDURE — 83036 HEMOGLOBIN GLYCOSYLATED A1C: CPT

## 2025-04-29 PROCEDURE — 84460 ALANINE AMINO (ALT) (SGPT): CPT

## 2025-04-29 RX ORDER — ROSUVASTATIN CALCIUM 10 MG/1
10 TABLET, COATED ORAL EVERY EVENING
Qty: 100 TABLET | Refills: 3 | Status: SHIPPED | OUTPATIENT
Start: 2025-04-29

## 2025-05-02 PROBLEM — F12.20 CANNABIS DEPENDENCE, UNCOMPLICATED (HCC): Status: ACTIVE | Noted: 2025-05-02

## 2025-05-05 ENCOUNTER — NON-PROVIDER VISIT (OUTPATIENT)
Dept: ENDOCRINOLOGY | Facility: MEDICAL CENTER | Age: 60
End: 2025-05-05
Attending: INTERNAL MEDICINE
Payer: MEDICARE

## 2025-05-05 VITALS
HEIGHT: 69 IN | SYSTOLIC BLOOD PRESSURE: 112 MMHG | WEIGHT: 130 LBS | DIASTOLIC BLOOD PRESSURE: 70 MMHG | HEART RATE: 84 BPM | OXYGEN SATURATION: 96 % | BODY MASS INDEX: 19.26 KG/M2

## 2025-05-05 DIAGNOSIS — E10.65 UNCONTROLLED TYPE 1 DIABETES MELLITUS WITH HYPERGLYCEMIA (HCC): ICD-10-CM

## 2025-05-05 DIAGNOSIS — E55.9 VITAMIN D INSUFFICIENCY: ICD-10-CM

## 2025-05-05 DIAGNOSIS — E10.65 TYPE 1 DIABETES MELLITUS WITH HYPERGLYCEMIA (HCC): ICD-10-CM

## 2025-05-05 DIAGNOSIS — E78.5 DYSLIPIDEMIA: ICD-10-CM

## 2025-05-05 PROCEDURE — 95249 CONT GLUC MNTR PT PROV EQP: CPT | Performed by: INTERNAL MEDICINE

## 2025-05-05 PROCEDURE — 99213 OFFICE O/P EST LOW 20 MIN: CPT | Mod: 25 | Performed by: INTERNAL MEDICINE

## 2025-05-05 RX ORDER — PEN NEEDLE, DIABETIC 32GX 5/32"
NEEDLE, DISPOSABLE MISCELLANEOUS
Qty: 400 EACH | Refills: 3 | Status: SHIPPED | OUTPATIENT
Start: 2025-05-05

## 2025-05-05 ASSESSMENT — FIBROSIS 4 INDEX: FIB4 SCORE: 1.33

## 2025-05-05 NOTE — PROGRESS NOTES
RN-CDE Note    Subjective:   Endocrinology Clinic Progress Note  PCP: Vincent Ding M.D.    HPI:  Celso Rose is a 60 y.o. old patient who is seen today by the Diabetes Nurse Specialist for review of Type 1 Diabetes.  Recent changes in health: Health good.  Had a hypoglycemia event in [place] in January.  He states he didn't eat all day due to going to the [employer] game.  He states he needs to have his bottom teeth removed that are broken and rotten.  DM:   Last A1c:   Lab Results   Component Value Date/Time    HBA1C 8.1 (H) 04/29/2025 07:31 AM        A1C GOAL: < 7    Diabetes Medications:   Lantus 15 units daily  Novolog 1:12 plus 1:50>150.  Using more fast acting insulin.         Exercise: Walking dog and very active.  Diet: Poor appetite.  New digestive health doctor.        Exercise and nutrition counseling were performed at this visit.  Turkey sandwich and a lot of cheese.  Problems with constipation and his stomach hurts.  He states that when he does eat then he gets hungry for more.  Glucose monitoring frequency: See [place] download    Hypoglycemic episodes: tries not to have lows.     Last Retinal Exam:  Scheduling soon  Daily Foot Exam: Yes   Foot Exam:  Monofilament: done  Monofilament testing with a 10 gram force: sensation intact: intact bilaterally  Visual Inspection: Feet without maceration, ulcers, fissures.  He was unable to afford the new toe nail fungus medication.  Pedal pulses: intact bilaterally   Lab Results   Component Value Date/Time    MALBCRT see below 04/29/2025 07:31 AM    MICROALBUR <1.2 04/29/2025 07:31 AM        ACR Albumin/Creatinine Ratio goal <30     HTN:   Blood pressure goal <130/<80   Currently Rx ACE/ARB:  no    Dyslipidemia:    Lab Results   Component Value Date/Time    CHOLSTRLTOT 172 04/29/2025 07:31 AM     (H) 04/29/2025 07:31 AM    HDL 42 04/29/2025 07:31 AM    TRIGLYCERIDE 112 04/29/2025 07:31 AM         Currently Rx Statin:  yes    He  reports that  he quit smoking about 4 years ago. His smoking use included cigarettes. He started smoking about 5 years ago. He has a 1 pack-year smoking history. He has never used smokeless tobacco.    Plan:     Discussed and educated on:   - All medications, side effects and compliance (discussed carefully)  - Annual eye examinations at Ophthalmology  - Home glucose monitoring emphasized  - Weight control and daily exercise    Recommended medication changes: He will increase his Lantus to 16 units daily and continue Novolog 1:12 plus 1:50>150.  He is really trying to keep his blood sugars in better control.  He will follow up with Dr. Graves on 1/8/26.

## 2025-05-22 ENCOUNTER — TELEPHONE (OUTPATIENT)
Dept: ENDOCRINOLOGY | Facility: MEDICAL CENTER | Age: 60
End: 2025-05-22
Payer: MEDICARE

## 2025-05-22 DIAGNOSIS — E10.65 UNCONTROLLED TYPE 1 DIABETES MELLITUS WITH HYPERGLYCEMIA (HCC): Primary | ICD-10-CM

## 2025-05-22 NOTE — TELEPHONE ENCOUNTER
Pt called saying he has gotten his reynaldo reader wet and is in need of a new prescription to the pharmacy. I advised pt I would get a provider to send prescription.

## 2025-06-10 DIAGNOSIS — E10.65 UNCONTROLLED TYPE 1 DIABETES MELLITUS WITH HYPERGLYCEMIA (HCC): ICD-10-CM

## 2025-06-10 RX ORDER — INSULIN ASPART 100 [IU]/ML
INJECTION, SOLUTION INTRAVENOUS; SUBCUTANEOUS
Qty: 30 ML | Refills: 2 | Status: SHIPPED | OUTPATIENT
Start: 2025-06-10 | End: 2025-06-23

## 2025-06-23 ENCOUNTER — TELEPHONE (OUTPATIENT)
Dept: ENDOCRINOLOGY | Facility: MEDICAL CENTER | Age: 60
End: 2025-06-23
Payer: MEDICARE

## 2025-06-23 DIAGNOSIS — E10.65 UNCONTROLLED TYPE 1 DIABETES MELLITUS WITH HYPERGLYCEMIA (HCC): ICD-10-CM

## 2025-06-23 RX ORDER — INSULIN ASPART 100 [IU]/ML
INJECTION, SOLUTION INTRAVENOUS; SUBCUTANEOUS
Qty: 30 ML | Refills: 1 | Status: SHIPPED | OUTPATIENT
Start: 2025-06-23

## 2025-06-23 RX ORDER — INSULIN GLARGINE 100 [IU]/ML
INJECTION, SOLUTION SUBCUTANEOUS
Qty: 15 ML | Refills: 0 | Status: SHIPPED | OUTPATIENT
Start: 2025-06-23

## 2025-06-23 NOTE — TELEPHONE ENCOUNTER
VOICEMAIL  1. Caller Name: Scott                      Call Back Number: [phone]    2. Message: following up on Novolog prescription. He is advised by his pharmacy it was denied.     3. Patient approves office to leave a detailed voicemail/MyChart message: N\A

## 2025-07-15 ENCOUNTER — OFFICE VISIT (OUTPATIENT)
Dept: MEDICAL GROUP | Facility: PHYSICIAN GROUP | Age: 60
End: 2025-07-15
Payer: MEDICARE

## 2025-07-15 VITALS
RESPIRATION RATE: 16 BRPM | WEIGHT: 126.2 LBS | OXYGEN SATURATION: 95 % | TEMPERATURE: 98.1 F | HEART RATE: 88 BPM | BODY MASS INDEX: 18.69 KG/M2 | DIASTOLIC BLOOD PRESSURE: 58 MMHG | HEIGHT: 69 IN | SYSTOLIC BLOOD PRESSURE: 108 MMHG

## 2025-07-15 DIAGNOSIS — E10.69 DYSLIPIDEMIA DUE TO TYPE 1 DIABETES MELLITUS (HCC): Chronic | ICD-10-CM

## 2025-07-15 DIAGNOSIS — E10.65 TYPE 1 DIABETES MELLITUS WITH HYPERGLYCEMIA (HCC): Primary | ICD-10-CM

## 2025-07-15 DIAGNOSIS — K63.5 POLYP OF COLON, UNSPECIFIED PART OF COLON, UNSPECIFIED TYPE: Chronic | ICD-10-CM

## 2025-07-15 DIAGNOSIS — E78.5 DYSLIPIDEMIA DUE TO TYPE 1 DIABETES MELLITUS (HCC): Chronic | ICD-10-CM

## 2025-07-15 DIAGNOSIS — I70.0 AORTIC ATHEROSCLEROSIS (HCC): Chronic | ICD-10-CM

## 2025-07-15 DIAGNOSIS — E55.9 VITAMIN D DEFICIENCY: Chronic | ICD-10-CM

## 2025-07-15 DIAGNOSIS — F31.81 BIPOLAR 2 DISORDER, MAJOR DEPRESSIVE EPISODE (HCC): Chronic | ICD-10-CM

## 2025-07-15 DIAGNOSIS — E55.9 VITAMIN D INSUFFICIENCY: ICD-10-CM

## 2025-07-15 DIAGNOSIS — L98.9 SKIN LESION: ICD-10-CM

## 2025-07-15 PROBLEM — E10.59 HYPERTENSION ASSOCIATED WITH TYPE 1 DIABETES MELLITUS (HCC): Chronic | Status: RESOLVED | Noted: 2024-07-30 | Resolved: 2025-07-15

## 2025-07-15 PROBLEM — I15.2 HYPERTENSION ASSOCIATED WITH TYPE 1 DIABETES MELLITUS (HCC): Chronic | Status: RESOLVED | Noted: 2024-07-30 | Resolved: 2025-07-15

## 2025-07-15 PROBLEM — F12.20 CANNABIS DEPENDENCE, UNCOMPLICATED (HCC): Status: RESOLVED | Noted: 2025-05-02 | Resolved: 2025-07-15

## 2025-07-15 PROBLEM — D36.9 ADENOMATOUS POLYP: Status: RESOLVED | Noted: 2024-08-20 | Resolved: 2025-07-15

## 2025-07-15 PROCEDURE — 3078F DIAST BP <80 MM HG: CPT | Performed by: INTERNAL MEDICINE

## 2025-07-15 PROCEDURE — 3074F SYST BP LT 130 MM HG: CPT | Performed by: INTERNAL MEDICINE

## 2025-07-15 PROCEDURE — 92250 FUNDUS PHOTOGRAPHY W/I&R: CPT | Mod: TC | Performed by: INTERNAL MEDICINE

## 2025-07-15 PROCEDURE — 99214 OFFICE O/P EST MOD 30 MIN: CPT | Performed by: INTERNAL MEDICINE

## 2025-07-15 RX ORDER — ERGOCALCIFEROL 1.25 MG/1
50000 CAPSULE, LIQUID FILLED ORAL
Qty: 12 CAPSULE | Refills: 3 | Status: SHIPPED | OUTPATIENT
Start: 2025-07-15

## 2025-07-15 ASSESSMENT — FIBROSIS 4 INDEX: FIB4 SCORE: 1.33

## 2025-07-15 NOTE — ASSESSMENT & PLAN NOTE
Chronic condition for the patient has been taking vitamin D supplementation once a week.  Patient request Rx refill.

## 2025-07-15 NOTE — ASSESSMENT & PLAN NOTE
Chronic condition.  Patient currently followed by specialist.  Patient reported that his symptoms are stable and is currently not on therapy.  Patient denies SI.

## 2025-07-15 NOTE — Clinical Note
REFERRAL APPROVAL NOTICE         Sent on July 15, 2025                   Celso Billsval  [address]   [place] [address]                   Dear Mr. Rose,    After a careful review of the medical information and benefit coverage, [facility] has processed your referral. See below for additional details.    If applicable, you must be actively enrolled with your insurance for coverage of the authorized service. If you have any questions regarding your coverage, please contact your insurance directly.    REFERRAL INFORMATION   Referral #:  [id number]  Referred-To Provider    Referred-By Provider:  Endocrinology    Vincent Ding M.D.   [facility] ([facility]      [address]  [place] [address]  [phone] [address] [place] [address]  [place] [address]  [phone]    Referral Start Date:  07/15/2025  Referral End Date:   07/15/2026             SCHEDULING  If you do not already have an appointment, please call [phone] to make an appointment.     MORE INFORMATION  If you do not already have a [employer] account, sign up at: [email or web address]  You can access your medical information, make appointments, see lab results, billing information, and more.  If you have questions regarding this referral, please contact  the [facility] Referrals department at:             [phone]. Monday - Friday 8:00AM - 5:00PM.     Sincerely,    [facility]

## 2025-07-15 NOTE — ASSESSMENT & PLAN NOTE
Chronic condition.  Patient currently on insulin therapy.  Patient requests update referral to [facility]  Patient reported intermittent episode of hypoglycemia.  Previous A1c 8.1%.

## 2025-07-15 NOTE — ASSESSMENT & PLAN NOTE
Chronic condition.  Uncontrolled.  Lab test show patient with elevated LDL level.  Patient has prescription for rosuvastatin but he has not been taking the medication.  Patient asymptomatic.

## 2025-07-15 NOTE — ASSESSMENT & PLAN NOTE
Chronic condition noted over previous CT scan 2023.  Patient asymptomatic.    Pt to continue with a healthy low saturated fat, low sugar, mostly plant based diet and regular exercise.   It is also important to maintain good blood pressure control.

## 2025-07-15 NOTE — Clinical Note
REFERRAL APPROVAL NOTICE         Sent on July 15, 2025                   Celso Rose  [address] [place] [address]                   Dear Mr. Rose,    After a careful review of the medical information and benefit coverage, [facility] has processed your referral. See below for additional details.    If applicable, you must be actively enrolled with your insurance for coverage of the authorized service. If you have any questions regarding your coverage, please contact your insurance directly.    REFERRAL INFORMATION   Referral #:  [id number]  Referred-To Department    Referred-By Provider:  Dermatology    Vincent Ding M.D.   [facility]      [address]  [place] [address]  [phone] [address]  [place] [id number] [phone]    Referral Start Date:  07/15/2025  Referral End Date:   07/15/2026             SCHEDULING  If you do not already have an appointment, please call [phone] to make an appointment.     MORE INFORMATION  If you do not already have a [employer] account, sign up at: [email or web address]  You can access your medical information, make appointments, see lab results, billing information, and more.  If you have questions regarding this referral, please contact  the [facility] Referrals department at:             [phone]. Monday - Friday 8:00AM - 5:00PM.     Sincerely,    [facility]

## 2025-07-15 NOTE — ASSESSMENT & PLAN NOTE
Chronic condition.  The patient reported several skin lesions that have changeD in shapes and size.  The patient request referral to see dermatologist.

## 2025-07-15 NOTE — PROGRESS NOTES
PRIMARY CARE CLINIC VISIT        Chief Complaint   Patient presents with    Referral Needed     Endo; derm    Medication Management     Vit d2      Referral to endocrinology  Referral to dermatology  Medication refill  Follow-up diabetes  Hyperlipidemia  Colon polyp  Aortic atherosclerosis        History of Present Illness     Bipolar 2 disorder, major depressive episode (HCC)  Chronic condition.  Patient currently followed by specialist.  Patient reported that his symptoms are stable and is currently not on therapy.  Patient denies SI.    Type 1 diabetes mellitus with hyperglycemia (HCC)  Chronic condition.  Patient currently on insulin therapy.  Patient requests update referral to [facility]  Patient reported intermittent episode of hypoglycemia.  Previous A1c 8.1%.    Dyslipidemia due to type 1 diabetes mellitus (HCC)  Chronic condition.  Uncontrolled.  Lab test show patient with elevated LDL level.  Patient has prescription for rosuvastatin but he has not been taking the medication.  Patient asymptomatic.    Colon polyp  This is chronic condition.  Patient had colonoscopy done August 2024.  Patient was noted with 2 polyps.  GI recommend to repeat the colonoscopy in approximately 7 years.  Patient denies abdominal pain change in bowel movement or GI bleeding.    Skin lesion  Chronic condition.  The patient reported several skin lesions that have changeD in shapes and size.  The patient request referral to see dermatologist.    Aortic atherosclerosis (HCC)  Chronic condition noted over previous CT scan 2023.  Patient asymptomatic.    Pt to continue with a healthy low saturated fat, low sugar, mostly plant based diet and regular exercise.   It is also important to maintain good blood pressure control.    Vitamin D deficiency  Chronic condition for the patient has been taking vitamin D supplementation once a week.  Patient request Rx refill.    Medications Ordered Prior to Encounter[1]     Allergies: Patient has no known  "allergies.    Current Medications and Prescriptions Ordered in Epic[2]    Past Medical History[3]    Past Surgical History[4]    Family History   Problem Relation Age of Onset    Heart Disease Mother     Diabetes Father     Other Father         gambling disorder    Drug abuse Sister     ADD / ADHD Sister     Hypertension Sister     Alcohol/Drug Sister     Dementia Paternal Grandmother     Autism spectrum disorder Grandson        Tobacco Use History[5]    Social History     Substance and Sexual Activity   Alcohol Use No       Review of systems  As per HPI above. All other systems reviewed and negative.      Past Medical, Social, and Family history reviewed and updated in [facility]       LAB DATA:     I have independently reviewed / interpreted labs    Lab Results   Component Value Date/Time    HBA1C 8.1 (H) 04/29/2025 07:31 AM    HBA1C 8.2 (A) 11/07/2024 07:54 AM    HBA1C 8.3 (H) 07/31/2024 07:00 AM        Lab Results   Component Value Date/Time    WBC 9.4 07/31/2024 07:00 AM    HEMOGLOBIN 15.0 07/31/2024 07:00 AM    HEMATOCRIT 44.4 07/31/2024 07:00 AM    .4 (H) 07/31/2024 07:00 AM    PLATELETCT 209 07/31/2024 07:00 AM       Lab Results   Component Value Date/Time    SODIUM 139 04/29/2025 07:31 AM    POTASSIUM 4.3 04/29/2025 07:31 AM    GLUCOSE 211 (H) 04/29/2025 07:31 AM    BUN 13 04/29/2025 07:31 AM    CREATININE 0.92 04/29/2025 07:31 AM       Lab Results   Component Value Date/Time    CHOLSTRLTOT 172 04/29/2025 07:31 AM    TRIGLYCERIDE 112 04/29/2025 07:31 AM    HDL 42 04/29/2025 07:31 AM     (H) 04/29/2025 07:31 AM       Lab Results   Component Value Date/Time    ALTSGPT 14 04/29/2025 07:31 AM          Objective     /58 (BP Location: Right arm, Patient Position: Sitting, BP Cuff Size: Adult)   Pulse 88   Temp 36.7 °C (98.1 °F) (Temporal)   Resp 16   Ht 1.753 m (5' 9\")   Wt 57.2 kg (126 lb 3.2 oz)   SpO2 95%    Body mass index is 18.64 kg/m².    General: alert in no apparent " distress.  Cardiovascular: regular rate and rhythm  Pulmonary: lungs : no wheezing   Gastrointestinal: BS present.       Assessment and Plan     1. Type 1 diabetes mellitus with hyperglycemia (HCC)  Chronic condition.  Uncontrolled.  A1c 8.1%.  Patient declined to increase the dose of Lantus due to concern of hypoglycemia.  Patient request referral to see endocrinologist.  Clinical notes:   -Discussed with the patient goals for Diabetes management:   HbA1C < 7% /  Fasting BG   /  2 hrs post meal BG below 160  -Reviewed pathophysiology of diabetes and the importance of glycemic control to reduce the risk of diabetes related complications   Microvascular: retinopathy, neuropathy, nephropathy  Macrovascular: heart attack, stroke, peripheral vascular dz  -Advised pt to monitor sugar closely  -Counseled pt the importance of recognizing and treating hypoglycemia.   -The importance of increasing physical activity to improve diabetes control  discussed with the patient.   -Patient was also educated on changing diet and making better choices to help control blood sugar.          - POCT Retinal Eye Exam  - Referral to Endocrinology    2. Bipolar 2 disorder, major depressive episode (HCC)  Chronic stable condition off of therapy.  Patient continue to see therapist on regular basis.  Counseling given today.  Patient denies SI.  Continue to monitor    3. Dyslipidemia due to type 1 diabetes mellitus (HCC)  Chronic condition.  Uncontrolled with LDL level elevated.  Advised the patient to start taking rosuvastatin 10 mg daily.    4. Skin lesion  - Referral to Dermatology    5. Vitamin D insufficiency  Chronic condition.  Recent lab test for vitamin D in April 2025 normal.  Patient to continue vitamin D2 50 unit once a week.  Refill sent to pharmacy  - vitamin D2 (ERGOCALCIFEROL) 1.25 MG (37315 UT) Cap capsule; Take 1 Capsule by mouth every 7 days.  Dispense: 12 Capsule; Refill: 3    6. Polyp of colon, unspecified part of  colon, unspecified type  Chronic condition.  Patient to repeat colonoscopy in 2031.  Currently the patient asymptomatic    7. Aortic atherosclerosis (HCC)  Chronic condition for advised the patient to start taking rosuvastatin 10 mg daily.      FOLLOW Up 6 months            Please note that this dictation was created using voice recognition software. I am continuously working with the software to minimize the number of voice recognition errors, and I have made every attempt to manually correct the errors within my dictation. However, errors related to this voice recognition software may still exist and should be interpreted within the appropriate context.     Vincent Ding MD  Internal Medicine  [facility]       [1]   Current Outpatient Medications on File Prior to Visit   Medication Sig Dispense Refill    NOVOLOG FLEXPEN RELION 100 UNIT/ML injection PEN INJECT 10 UNITS SUBCUTANEOUSLY THREE TIMES DAILY BEFORE MEAL(S) 30 mL 1    LANTUS SOLOSTAR 100 UNIT/ML Solution Pen-injector injection PEN INJECT 25-30 UNITS SUBCUTANEOUSLY ONCE DAILY IN THE EVENING 15 mL 0    Continuous Glucose  (FREESTYLE SYED 2 READER) Device 1 Each continuous. 1 Each 0    Insulin Pen Needle 32 G x 4 mm (BD PEN NEEDLE ADA U/F) Using 1 pen needle with insulin injections 4 times a day 400 Each 3    Continuous Glucose Sensor (FREESTYLE SYED 2 SENSOR) Misc APPLY 1 SENSOR EVERY 14 DAYS 2 Each 10    CONTOUR NEXT TEST strip USE STRIP TO CHECK GLUCOSE THREE TIMES DAILY AND AS NEEDED FOR SIGNS AND SYMPTOMS OR LOW SUGAR      Blood Glucose Test Strips Test strips order: Test strips for Bernard Contour Next meter. Sig: use 3 times daily and prn ssx high or low sugar 100 Each 3    rosuvastatin (CRESTOR) 10 MG Tab Take 1 Tablet by mouth every evening. (Patient not taking: Reported on 7/15/2025) 100 Tablet 3     No current facility-administered medications on file prior to visit.   [2]   Current Outpatient Medications Ordered in Epic    Medication Sig Dispense Refill    vitamin D2 (ERGOCALCIFEROL) 1.25 MG (89898 UT) Cap capsule Take 1 Capsule by mouth every 7 days. 12 Capsule 3    NOVOLOG FLEXPEN RELION 100 UNIT/ML injection PEN INJECT 10 UNITS SUBCUTANEOUSLY THREE TIMES DAILY BEFORE MEAL(S) 30 mL 1    LANTUS SOLOSTAR 100 UNIT/ML Solution Pen-injector injection PEN INJECT 25-30 UNITS SUBCUTANEOUSLY ONCE DAILY IN THE EVENING 15 mL 0    Continuous Glucose  (FREESTYLE SYED 2 READER) Device 1 Each continuous. 1 Each 0    Insulin Pen Needle 32 G x 4 mm (BD PEN NEEDLE ADA U/F) Using 1 pen needle with insulin injections 4 times a day 400 Each 3    Continuous Glucose Sensor (FREESTYLE SYED 2 SENSOR) Misc APPLY 1 SENSOR EVERY 14 DAYS 2 Each 10    CONTOUR NEXT TEST strip USE STRIP TO CHECK GLUCOSE THREE TIMES DAILY AND AS NEEDED FOR SIGNS AND SYMPTOMS OR LOW SUGAR      Blood Glucose Test Strips Test strips order: Test strips for Bernard Contour Next meter. Sig: use 3 times daily and prn ssx high or low sugar 100 Each 3    rosuvastatin (CRESTOR) 10 MG Tab Take 1 Tablet by mouth every evening. (Patient not taking: Reported on 7/15/2025) 100 Tablet 3     No current Epic-ordered facility-administered medications on file.   [3]   Past Medical History:  Diagnosis Date    Arthritis     Bipolar 1 disorder (HCC)     Bipolar disorder (HCC)     Diabetes (HCC)     Irritable bowel disease     Tobacco abuse 2018   [4]   Past Surgical History:  Procedure Laterality Date    KNEE ARTHROSCOPY      SHOULDER ARTHROSCOPY     [5]   Social History  Tobacco Use   Smoking Status Former    Current packs/day: 0.00    Average packs/day: 1 pack/day for 1 year (1.0 ttl pk-yrs)    Types: Cigarettes    Start date: 2020    Quit date: 2021    Years since quittin.2   Smokeless Tobacco Never   Tobacco Comments    started smoking at age 14 quit for a couple months then started

## 2025-07-15 NOTE — ASSESSMENT & PLAN NOTE
This is chronic condition.  Patient had colonoscopy done August 2024.  Patient was noted with 2 polyps.  GI recommend to repeat the colonoscopy in approximately 7 years.  Patient denies abdominal pain change in bowel movement or GI bleeding.

## 2025-07-16 LAB — RETINAL SCREEN: NEGATIVE
